# Patient Record
Sex: FEMALE | Race: WHITE | HISPANIC OR LATINO | Employment: FULL TIME | URBAN - METROPOLITAN AREA
[De-identification: names, ages, dates, MRNs, and addresses within clinical notes are randomized per-mention and may not be internally consistent; named-entity substitution may affect disease eponyms.]

---

## 2018-09-14 ENCOUNTER — OFFICE VISIT (OUTPATIENT)
Dept: OBGYN CLINIC | Facility: CLINIC | Age: 33
End: 2018-09-14
Payer: COMMERCIAL

## 2018-09-14 VITALS
BODY MASS INDEX: 41.95 KG/M2 | DIASTOLIC BLOOD PRESSURE: 80 MMHG | SYSTOLIC BLOOD PRESSURE: 122 MMHG | WEIGHT: 293 LBS | HEIGHT: 70 IN

## 2018-09-14 DIAGNOSIS — Z11.51 ENCOUNTER FOR SCREENING FOR HUMAN PAPILLOMAVIRUS (HPV): ICD-10-CM

## 2018-09-14 DIAGNOSIS — Z01.419 ENCOUNTER FOR GYNECOLOGICAL EXAMINATION (GENERAL) (ROUTINE) WITHOUT ABNORMAL FINDINGS: Primary | ICD-10-CM

## 2018-09-14 DIAGNOSIS — Z30.09 ENCOUNTER FOR GENERAL COUNSELING AND ADVICE ON CONTRACEPTIVE MANAGEMENT: ICD-10-CM

## 2018-09-14 DIAGNOSIS — Z77.21 PERSONAL HISTORY OF EXPOSURE TO POTENTIALLY HAZARDOUS BODY FLUIDS: ICD-10-CM

## 2018-09-14 DIAGNOSIS — Z12.4 CERVICAL CANCER SCREENING: ICD-10-CM

## 2018-09-14 PROBLEM — F32.9 MAJOR DEPRESSIVE DISORDER: Status: ACTIVE | Noted: 2018-09-14

## 2018-09-14 PROCEDURE — S0610 ANNUAL GYNECOLOGICAL EXAMINA: HCPCS | Performed by: NURSE PRACTITIONER

## 2018-09-14 PROCEDURE — 87624 HPV HI-RISK TYP POOLED RSLT: CPT | Performed by: NURSE PRACTITIONER

## 2018-09-14 PROCEDURE — 87591 N.GONORRHOEAE DNA AMP PROB: CPT | Performed by: NURSE PRACTITIONER

## 2018-09-14 PROCEDURE — G0145 SCR C/V CYTO,THINLAYER,RESCR: HCPCS | Performed by: PATHOLOGY

## 2018-09-14 PROCEDURE — G0124 SCREEN C/V THIN LAYER BY MD: HCPCS | Performed by: PATHOLOGY

## 2018-09-14 PROCEDURE — 87491 CHLMYD TRACH DNA AMP PROBE: CPT | Performed by: NURSE PRACTITIONER

## 2018-09-14 RX ORDER — MISOPROSTOL 200 UG/1
200 TABLET ORAL ONCE
Qty: 1 TABLET | Refills: 0 | Status: SHIPPED | OUTPATIENT
Start: 2018-09-14 | End: 2018-11-06

## 2018-09-14 RX ORDER — BACLOFEN 20 MG/1
TABLET ORAL AS NEEDED
COMMUNITY
Start: 2018-08-31 | End: 2021-04-28

## 2018-09-14 RX ORDER — DICLOFENAC SODIUM 75 MG/1
TABLET, DELAYED RELEASE ORAL AS NEEDED
COMMUNITY
Start: 2018-08-31 | End: 2019-12-31 | Stop reason: HOSPADM

## 2018-09-14 NOTE — ASSESSMENT & PLAN NOTE
Normal findings on routine gyn exam  Advised monthly SBE, annual CBE and baseline screening mammo at age 36  Reviewed ASCCP guidelines and recommended pap with cotesting q3 yrs for this low risk patient; this was collected today  STI testing was offered and the patient does agree to gc/chl; she reports low risk  Diet/activity recommendations were reviewed and weight loss was encouraged  RTO in one year or sooner PRN

## 2018-09-14 NOTE — ASSESSMENT & PLAN NOTE
This patient presents for contraceptive counseling  We discussed all options at length including barrier methods, combination estrogen progesterone methods (pill, patch and ring), progesterone only methods (pill, Depo, Nexplanon and IUD) and non-hormonal methods (paragard, NFP, sterilization)  We reviewed directions for use, Ses/AEs, risks and benefits  All questions were answered  She is aware that condoms will be advised with all sexual contact for prevention of STI  The patient's personal and FH were reviewed and she is without risk factors for VTE  The patient elects Mirena IUD  Written info provided  She will RTO before the end of the Depo window  Cytotec provided

## 2018-09-14 NOTE — PROGRESS NOTES
Assessment/Plan:    Encounter for general counseling and advice on contraceptive management  This patient presents for contraceptive counseling  We discussed all options at length including barrier methods, combination estrogen progesterone methods (pill, patch and ring), progesterone only methods (pill, Depo, Nexplanon and IUD) and non-hormonal methods (paragard, NFP, sterilization)  We reviewed directions for use, Ses/AEs, risks and benefits  All questions were answered  She is aware that condoms will be advised with all sexual contact for prevention of STI  The patient's personal and FH were reviewed and she is without risk factors for VTE  The patient elects Mirena IUD  Written info provided  She will RTO before the end of the Depo window  Cytotec provided  Encounter for gynecological examination (general) (routine) without abnormal findings  Normal findings on routine gyn exam  Advised monthly SBE, annual CBE and baseline screening mammo at age 36  Reviewed ASCCP guidelines and recommended pap with cotesting q3 yrs for this low risk patient; this was collected today  STI testing was offered and the patient does agree to gc/chl; she reports low risk  Diet/activity recommendations were reviewed and weight loss was encouraged  RTO in one year or sooner PRN  Diagnoses and all orders for this visit:    Encounter for gynecological examination (general) (routine) without abnormal findings    Cervical cancer screening  -     Liquid-based pap, screening    Encounter for screening for human papillomavirus (HPV)  -     Liquid-based pap, screening    Personal history of exposure to potentially hazardous body fluids  -     Chlamydia/GC amplified DNA by PCR    Encounter for general counseling and advice on contraceptive management  -     misoprostol (CYTOTEC) 200 mcg tablet;  Take 1 tablet (200 mcg total) by mouth once for 1 dose    Other orders  -     baclofen 20 mg tablet;   -     diclofenac (VOLTAREN) 75 mg EC tablet;           Subjective:      Patient ID: Mele Carrion is a 28 y o  female  This new patient presents for routine annual gyn exam  Transferring care from [facility]  Helen Prader is a 28 y o  G0  PMHx noncontrib  Gyn hx notable for remote abn pap with normal f/u; denies h/o STI  FH neg for breast, ova or colon ca  Currently on Depo and wishes to d/c due to working aggressively on weight loss  Interested in [place] [address] due to h/o heavy menses  Amenorrheic on Depo  She denies acute gyn complaints today  She denies pelvic pain, abn discharge, breast concerns, bowel/bladder dysfunction, depression/anx  Long term monog relationship  Denies STI concerns but agrees to testing  Works in [employer]          The following portions of the patient's history were reviewed and updated as appropriate: allergies, current medications, past family history, past medical history, past social history, past surgical history and problem list     Review of Systems   Constitutional: Negative  HENT: Negative  Eyes: Negative  Respiratory: Negative  Cardiovascular: Negative  Gastrointestinal: Negative  Endocrine: Negative  Genitourinary: Negative  Musculoskeletal: Negative  Skin: Negative  Allergic/Immunologic: Negative  Neurological: Negative  Hematological: Negative  Psychiatric/Behavioral: Negative  Objective:      /80 (BP Location: Right arm, Cuff Size: Extra-Large)   Ht 5' 10" (1 778 m)   Wt (!) 158 kg (349 lb)   BMI 50 08 kg/m²          Physical Exam   Constitutional: She is oriented to person, place, and time  She appears well-developed and well-nourished  BMI 50   HENT:   Head: Normocephalic and atraumatic  Eyes: EOM are normal  Pupils are equal, round, and reactive to light  Neck: Normal range of motion  Neck supple  No thyromegaly present  Cardiovascular: Normal rate, regular rhythm and normal heart sounds      Pulmonary/Chest: Effort normal and breath sounds normal  No respiratory distress  She has no wheezes  She has no rales  She exhibits no mass, no tenderness and no deformity  Right breast exhibits no inverted nipple, no mass, no nipple discharge, no skin change and no tenderness  Left breast exhibits no inverted nipple, no mass, no nipple discharge, no skin change and no tenderness  Breasts are symmetrical    Abdominal: Soft  She exhibits no distension and no mass  There is no splenomegaly or hepatomegaly  There is no tenderness  There is no rebound and no guarding  Genitourinary: Rectum normal, vagina normal and uterus normal  No breast swelling, tenderness or discharge  No labial fusion  There is no rash, tenderness, lesion or injury on the right labia  There is no rash, tenderness, lesion or injury on the left labia  Cervix exhibits no motion tenderness, no discharge and no friability  Right adnexum displays no mass, no tenderness and no fullness  Left adnexum displays no mass, no tenderness and no fullness  No erythema, tenderness or bleeding in the vagina  No foreign body in the vagina  No vaginal discharge found  Musculoskeletal: Normal range of motion  Lymphadenopathy:     She has no cervical adenopathy  She has no axillary adenopathy  Neurological: She is alert and oriented to person, place, and time  No cranial nerve deficit  Skin: Skin is warm and dry  No rash noted  No cyanosis  Nails show no clubbing  Psychiatric: She has a normal mood and affect   Her speech is normal and behavior is normal  Judgment and thought content normal  Cognition and memory are normal

## 2018-09-17 LAB
CHLAMYDIA DNA CVX QL NAA+PROBE: NORMAL
HPV HR 12 DNA CVX QL NAA+PROBE: POSITIVE
HPV16 DNA CVX QL NAA+PROBE: NEGATIVE
HPV18 DNA CVX QL NAA+PROBE: NEGATIVE
N GONORRHOEA DNA GENITAL QL NAA+PROBE: NORMAL

## 2018-09-19 ENCOUNTER — TELEPHONE (OUTPATIENT)
Dept: OBGYN CLINIC | Facility: CLINIC | Age: 33
End: 2018-09-19

## 2018-09-19 LAB
LAB AP GYN PRIMARY INTERPRETATION: NORMAL
Lab: NORMAL
PATH INTERP SPEC-IMP: NORMAL

## 2018-09-19 NOTE — TELEPHONE ENCOUNTER
----- Message from Abeba Vines, [address] sent at 9/19/2018  4:53 PM EDT -----  Pap with ASCUS, pos HR HPV   Please notify patient and advise scheduling colpo at her earliest convenience   Neg gc/chl

## 2018-09-19 NOTE — TELEPHONE ENCOUNTER
Mirena IUD arrived today from [facility] pharmacy  Pt can be scheduled for insertion at any time with Ray, she is on Depo for birth control

## 2018-09-19 NOTE — TELEPHONE ENCOUNTER
Left voicemail message today @ [phone] for Pt to call back office regarding Pap smear result

## 2018-09-20 NOTE — TELEPHONE ENCOUNTER
Patient returned call - she is aware of pap results and that she needs a colpo  Explained what a colpo was and how it is done  Advised to take 2-3 Ibuprofen an hour before hand and to eat something light also  Patient stated she is scheduled for an IUD insertion for 09/25 - wants to know if she has to cancel that  Please advise  Thanks!

## 2018-09-21 NOTE — TELEPHONE ENCOUNTER
Patient aware  Confirmed appt for IUD insert  Will schedule Colposcopy on Tuesday when she is here for her appt

## 2018-09-21 NOTE — TELEPHONE ENCOUNTER
She can still proceed with appt for IUD insertion and schedule colpo on another day  It looks likes she is scheduled with me for IUD, so unfortunately we can't switch the visit to a colpo visit because I do not perform them

## 2018-09-25 ENCOUNTER — OFFICE VISIT (OUTPATIENT)
Dept: OBGYN CLINIC | Facility: CLINIC | Age: 33
End: 2018-09-25
Payer: COMMERCIAL

## 2018-09-25 VITALS
HEIGHT: 70 IN | DIASTOLIC BLOOD PRESSURE: 96 MMHG | SYSTOLIC BLOOD PRESSURE: 132 MMHG | BODY MASS INDEX: 41.95 KG/M2 | WEIGHT: 293 LBS

## 2018-09-25 DIAGNOSIS — Z30.430 ENCOUNTER FOR IUD INSERTION: Primary | ICD-10-CM

## 2018-09-25 LAB — SL AMB POCT URINE HCG: NEGATIVE

## 2018-09-25 PROCEDURE — 58300 INSERT INTRAUTERINE DEVICE: CPT | Performed by: NURSE PRACTITIONER

## 2018-09-25 PROCEDURE — 81025 URINE PREGNANCY TEST: CPT | Performed by: NURSE PRACTITIONER

## 2018-09-25 NOTE — ASSESSMENT & PLAN NOTE
IUD inserted without difficulty  Pt tolerated well  Reviewed reasons to call and sx to report incl excessive bleeding, pain unrelieved by ibuprofen or symptoms of infection such as fever, chills or foul smelling discharge  Recommended returning to the office in 6 weeks for IUD string check and utilizing condoms as back up until that time  The patient agrees to the plan

## 2018-09-25 NOTE — PROGRESS NOTES
Iud insertions  Date/Time: 9/25/2018 10:50 AM  Performed by: Mindy Rick  Authorized by: Mindy Rick     Consent:     Consent obtained:  Verbal and written (consent also obtained by Ardella Fothergill )    Consent given by:  Patient    Procedure risks and benefits discussed: yes      Patient questions answered: yes      Patient agrees, verbalizes understanding, and wants to proceed: yes      Educational handouts given: yes      Instructions and paperwork completed: yes    Procedure:     Pelvic exam performed: yes      Negative GC/chlamydia test: yes      Negative urine pregnancy test: yes      Cervix cleaned and prepped: yes      Speculum placed in vagina: yes      Tenaculum applied to cervix: yes      Uterus sounded: yes      IUD inserted with no complications: yes      IUD type:  Mirena    Strings trimmed: yes      Uterus sound depth (cm):  8  Post-procedure:     Patient tolerated procedure well: yes      Patient will follow up after next period: yes    Comments: This patient presents for IUD insertion  The risks/benefits, SE's/AE's were reviewed and all questions were answered  Written and verbal consent were obtained by myself and physician  Time out was performed and allergies were confirmed  The patient was positioned in lithotomy position and spec was inserted  Cervix was visualized and cleansed with betadine  Tenac was applied to the anterior cervix  Uterus sounded to 8cm  The IUD was inserted without difficulty and the strings were trimmed  Hemostasis was observed and all instruments were removed  The patient tolerated well

## 2018-09-28 ENCOUNTER — TELEPHONE (OUTPATIENT)
Dept: OBGYN CLINIC | Facility: CLINIC | Age: 33
End: 2018-09-28

## 2018-09-28 NOTE — TELEPHONE ENCOUNTER
Spoke with patient  Advised some cramping and spotting is normal  Continue with Advil/Aleeve  Continue with back up protection until string check  Patient verbalizes understanding

## 2018-09-28 NOTE — TELEPHONE ENCOUNTER
----- Message from [address] [place] [address]  Toño Espinoza sent at 9/28/2018 11:47 AM EDT -----  Regarding: Visit Follow-Up Question  Contact: [phone]  Ellis Hernandez! I had a couple follow up questions about the iud  I have been doing pretty well over all however I had a couple concerns which I'm sure mean nothing  I'm getting really bad cramps that wake me up at night at like 3am  I take Advil and then I'm able to go back to sleep  I have been ok during the day  Also, I had sex last night for the first time since the iud was placed, I used a condom as you suggested  There was no pain or discomfort  This morning I had some bleeding but not heavy, is that ok? And I should continue to use condoms until my string check correct?     Thanks,    Patricia Beckford

## 2018-11-06 ENCOUNTER — PROCEDURE VISIT (OUTPATIENT)
Dept: OBGYN CLINIC | Facility: MEDICAL CENTER | Age: 33
End: 2018-11-06
Payer: COMMERCIAL

## 2018-11-06 VITALS — DIASTOLIC BLOOD PRESSURE: 76 MMHG | BODY MASS INDEX: 47.95 KG/M2 | SYSTOLIC BLOOD PRESSURE: 112 MMHG | WEIGHT: 293 LBS

## 2018-11-06 DIAGNOSIS — R87.610 ASCUS WITH POSITIVE HIGH RISK HPV CERVICAL: Primary | ICD-10-CM

## 2018-11-06 DIAGNOSIS — R87.810 ASCUS WITH POSITIVE HIGH RISK HPV CERVICAL: Primary | ICD-10-CM

## 2018-11-06 PROBLEM — Z30.09 ENCOUNTER FOR GENERAL COUNSELING AND ADVICE ON CONTRACEPTIVE MANAGEMENT: Status: RESOLVED | Noted: 2018-09-14 | Resolved: 2018-11-06

## 2018-11-06 PROBLEM — Z01.419 ENCOUNTER FOR GYNECOLOGICAL EXAMINATION (GENERAL) (ROUTINE) WITHOUT ABNORMAL FINDINGS: Status: RESOLVED | Noted: 2018-09-14 | Resolved: 2018-11-06

## 2018-11-06 PROBLEM — Z30.430 ENCOUNTER FOR IUD INSERTION: Status: RESOLVED | Noted: 2018-09-25 | Resolved: 2018-11-06

## 2018-11-06 PROCEDURE — 88305 TISSUE EXAM BY PATHOLOGIST: CPT | Performed by: PATHOLOGY

## 2018-11-06 PROCEDURE — 57454 BX/CURETT OF CERVIX W/SCOPE: CPT | Performed by: PHYSICIAN ASSISTANT

## 2018-11-06 NOTE — ASSESSMENT & PLAN NOTE
Colposcopy, 2 biopsies, and ECC performed  Specimen sent to pathology, I will call patient with results  Counseled patient that she can expect cramping, light bleeding, and/or dark clumpy discharge  I recommend NSAIDs PRN cramping  She should call or RTO if any severe abdominal pain, heavy bleeding, or fever > 101    IUD in place but stings very short and pushed into cervical canal when doing ECC

## 2018-11-06 NOTE — PROGRESS NOTES
/76 (BP Location: Left arm)   Wt (!) 152 kg (334 lb 3 2 oz)   BMI 47 95 kg/m²     Colposcopy  Date/Time: 11/6/2018 12:16 PM  Performed by: Jeanne Cartwright  Authorized by: Jeanne Cartwright     Consent:     Consent obtained:  Written    Consent given by:  Patient    Procedural risks discussed:  Bleeding, damage to other organs, infection and repeat procedure    Patient questions answered: yes      Patient agrees, verbalizes understanding, and wants to proceed: yes      Instructions and paperwork completed: yes    Indication:     Indication:  ASC-US (HPV non 16/18 +)  Procedure:     Procedure: Colposcopy w/ cervical biopsy and ECC      Under satisfactory analgesia the patient was prepped and draped in the dorsal lithotomy position: yes      Penn Laird speculum was placed in the vagina: yes      Under colposcopic examination the transition zone was seen in entirety: yes (+) IUD strings visualized BUT VERY SHORT STRINGS, approx 0 5cm in length      Endocervix was curetted using a Kevorkian curette: yes (in doing ECC IUD strings pushed into cervical canal and no longer visible at end of procedure)      Cervical biopsy performed with a cervical biopsy punch: yes      Monsel's solution was applied: yes      Biopsy(s): yes      Location:  12:00 & 4:00    Specimen to pathology: yes    Post-procedure:     Findings: White epithelium      Impression: Low grade cervical dysplasia      Patient tolerance of procedure: Tolerated well, no immediate complications      Problem List Items Addressed This Visit     ASCUS with positive high risk HPV cervical - Primary     Colposcopy, 2 biopsies, and ECC performed  Specimen sent to pathology, I will call patient with results  Counseled patient that she can expect cramping, light bleeding, and/or dark clumpy discharge  I recommend NSAIDs PRN cramping  She should call or RTO if any severe abdominal pain, heavy bleeding, or fever > 101    IUD in place but stings very short and pushed into cervical canal when doing ECC             Relevant Orders    Tissue Exam

## 2018-11-09 ENCOUNTER — TELEPHONE (OUTPATIENT)
Dept: OBGYN CLINIC | Facility: CLINIC | Age: 33
End: 2018-11-09

## 2018-11-09 NOTE — TELEPHONE ENCOUNTER
----- Message from Anoop Laureano PA-C sent at 11/9/2018  8:26 AM EST -----  This is Kinga's result - Please let dae know that her colposcopy specimens are normal   Would recommend Pap and HPV testing in one year

## 2019-07-17 ENCOUNTER — TELEPHONE (OUTPATIENT)
Dept: BARIATRICS | Facility: CLINIC | Age: 34
End: 2019-07-17

## 2019-07-17 NOTE — TELEPHONE ENCOUNTER
Email sent to patient to contact insurance regarding bariatric coverage/reminder of upcoming 3-hour eval cw

## 2019-07-18 RX ORDER — RANITIDINE 150 MG/1
150 TABLET ORAL AS NEEDED
COMMUNITY
Start: 2019-06-07 | End: 2019-11-06 | Stop reason: ALTCHOICE

## 2019-07-18 RX ORDER — BUPROPION HYDROCHLORIDE 150 MG/1
150 TABLET ORAL EVERY MORNING
COMMUNITY
Start: 2019-03-25 | End: 2020-02-20

## 2019-07-18 RX ORDER — ESCITALOPRAM OXALATE 20 MG/1
20 TABLET ORAL
COMMUNITY
Start: 2019-03-22 | End: 2019-09-28

## 2019-07-22 ENCOUNTER — CLINICAL SUPPORT (OUTPATIENT)
Dept: BARIATRICS | Facility: CLINIC | Age: 34
End: 2019-07-22

## 2019-07-22 VITALS
HEART RATE: 75 BPM | TEMPERATURE: 98.2 F | SYSTOLIC BLOOD PRESSURE: 138 MMHG | WEIGHT: 293 LBS | DIASTOLIC BLOOD PRESSURE: 86 MMHG | BODY MASS INDEX: 41.95 KG/M2 | HEIGHT: 70 IN

## 2019-07-22 DIAGNOSIS — E66.01 MORBID OBESITY (HCC): Primary | ICD-10-CM

## 2019-07-22 PROCEDURE — RECHECK

## 2019-07-22 NOTE — PROGRESS NOTES
Bariatric Nutrition Assessment Note    Type of surgery  6 Month Pre-op Program: Pt requires 6 weight checks 2/2 insurance requirements  Surgery Date: TBD  Surgeon: Dr Ludy Ortiz  35 y o   female    Vitals:    07/22/19 0821   BP: 138/86   Pulse: 75   Temp: 98 2 °F (36 8 °C)     Weight (last 2 days)     Date/Time   Weight    07/22/19 0821   (!) 161 (355)            Height: 70 0 inches  Body mass index is 50 94 kg/m²  Weight in (lb) to have BMI = 25: 174 3 lbs  Pre-Op Excess Wt: 180 7 lbs  10% of Initial Weight = 35 5 lbs  Goal Pre-operative Weight (Initial Weight - 10% of Initial Weight) = 319 5 lbs  (BMI 45 8)  Pt is aware that to schedule for surgery, she must lose 1/2 of required 10% weight loss: Scheduling weight = 337 3 lbs  Pt is aware that her final pre-operative weight goal is based on surgeon's individual recommendations  Pt is aware that she cannot gain weight in the pre-operative process  Estimated needs via [address] [place] [address] Equation based on pt's current height, weight, age, and sex:  BMR: 2399 kcal/day  Estimated calorie needs for weight maintenance = 2879 kcal per day (sedentary)  Estimated calorie needs for weight loss = 9648-1424 kcal per day (1-2 lb  wt loss per week - sedentary)  Estimated protein needs = 79-95 grams per day (1 0-1 2 grams/kg IBW)    Weight History   Onset of Obesity: Childhood  Family history of obesity: mother and sister per pt  In the past 6 months patient has tried the following weight loss attempts and has failed:  Commercial Programs ([employer], [employer], etc )  Exercise  High Protein/Low CHO diets (Atkins, Union, etc )  Self Created Diets (Portion Control, Healthy Food Choices, etc )  Maximum Wt Lost: 50 lbs   per pt with Weight Watchers    Review of History and Medications   Past Medical History:   Diagnosis Date    Anxiety     Back pain     Depressed     Femur fracture (HCC)     GERD (gastroesophageal reflux disease)     HPV (human papilloma virus) infection     Hypertension     Irritable bowel syndrome     Obesity     Pre-diabetes      Past Surgical History:   Procedure Laterality Date    FEMUR FRACTURE SURGERY      WISDOM TOOTH EXTRACTION       Social History     Socioeconomic History    Marital status: Single     Spouse name: None    Number of children: None    Years of education: None    Highest education level: None   Occupational History    None   Social Needs    Financial resource strain: None    Food insecurity:     Worry: None     Inability: None    Transportation needs:     Medical: None     Non-medical: None   Tobacco Use    Smoking status: Former Smoker     Last attempt to quit: 2017     Years since quittin 5    Smokeless tobacco: Former User     Quit date: 2016   Substance and Sexual Activity    Alcohol use: No    Drug use: No    Sexual activity: Yes     Partners: Male     Birth control/protection: Injection   Lifestyle    Physical activity:     Days per week: None     Minutes per session: None    Stress: None   Relationships    Social connections:     Talks on phone: None     Gets together: None     Attends Yazidi service: None     Active member of club or organization: None     Attends meetings of clubs or organizations: None     Relationship status: None    Intimate partner violence:     Fear of current or ex partner: None     Emotionally abused: None     Physically abused: None     Forced sexual activity: None   Other Topics Concern    None   Social History Narrative    None       Current Outpatient Medications:     baclofen 20 mg tablet, , Disp: , Rfl:     buPROPion (WELLBUTRIN XL) 150 mg 24 hr tablet, Take 150 mg by mouth, Disp: , Rfl:     diclofenac (VOLTAREN) 75 mg EC tablet, , Disp: , Rfl:     escitalopram (LEXAPRO) 20 mg tablet, Take 20 mg by mouth, Disp: , Rfl:     MIRENA, 52 MG, 20 MCG/24HR IUD, TO BE INSERTED ONE TIME BY PRESCRIBER  ROUTE INTRAUTERINE , Disp: , Rfl: 0    ranitidine (ZANTAC) 150 mg tablet, Take 150 mg by mouth, Disp: , Rfl:     Food Intake and Lifestyle Assessment:   Food Intake Assessment completed via usual diet recall:  Breakfast: overnight oats with 1/2 cup oats, 1/2 cup NF Greek yogurt, 1/2 cup unsweetened almond milk, 1/2 cup fruit, 1 scoop protein powder (pt unaware of brand); 2 eggs over easy with 1 piece potato bread toast  Snack: sometimes - granola bar [employer] protein bar)   Lunch: sometimes skip on days off 2-3 days per week - pt states if she does not pack her lunch, she will make bad choices with fast food; if pt does food prep - turkey meatballs; spaghetti squash; brown rice and grilled chicken or turkey or fish (salmon) or dinner leftovers; chicken crust pizza  Snack: none  Dinner: macaroni and cheese with chicken; similar foods to lunch; sometimes fast foods; salad with salmon and burger meat; eggplant lasagna with turkey, mushrooms, and garlic  Snack: ice cream - not daily, but pt states if it is in the house, she will eat it - sometime Enlightened (low kcal ice cream)    Pt states she and her boyfriend have bad habits with fast food and takeout, especially when they do not prepare their own foods at home      Beverage intake: water, regular soda and alcohol (socially), unsweetened tea with lemon  Estimated fluid intake per day: 40-80 ounces per day of water  Protein supplement: protein shake in the AM and sometimes a protein bar during the day  Estimated protein intake per day: at least 60 grams per day  Meals eaten away from home: frequently  Typical meal pattern: 2-3 meals per day and 2-3 snacks per day  Eating Behaviors: Consumption of high calorie/ high fat foods, Consumption of high calorie beverages, Large portion sizes and Craves sweet foods  Cultural or Congregational considerations: n/a  Food allergies or intolerances: none per pt  No Known Allergies    Physical Assessment  Physical Activity  Types of exercise: None  Walking at work  Current physical limitations: none    Psychosocial Assessment   Support systems: relative(s), significant other  Socioeconomic factors: n/a    Nutrition Diagnosis  Diagnosis: Overweight / Obesity (NC-3 3)  Related to: Excessive energy intake  As Evidenced by: BMI >25     Nutrition Prescription: Recommend the following diet  Low sugar, High protein, Regular     Interventions and Teaching   Discussed pre-op and post-op nutrition guidelines  Patient educated and handouts provided  Surgical changes to stomach / GI  Capacity of post-surgery stomach  Diet progression  Adequate hydration  Sugar and fat restriction to decrease "dumping syndrome"  Weight loss plateaus/ possibility of weight regain  Exercise  Suggestions for pre-op diet  Nutrition considerations after surgery  Protein supplements  Meal planning and preparation  Appropriate carbohydrate, protein, and fat intake, and food/fluid choices to maximize safe weight loss, nutrient intake, and tolerance   Dietary and lifestyle changes  Possible problems with poor eating habits  Intuitive eating  Techniques for self monitoring and keeping daily food journal  Potential for food intolerance after surgery, and ways to deal with them including: lactose intolerance, nausea, reflux, vomiting, diarrhea, food intolerance, appetite changes, gas  Vitamin / Mineral supplementation of Multivitamin with minerals and Vitamin D     Education provided to: patient and pt's boyfriend  Barriers to learning: No barriers identified  Readiness to change: preparation  Prior research on procedure: pre-op class  Comprehension: verbalizes understanding   Expected Compliance: good    Recommendations  Pt is an appropriate candidate for surgery   Yes     Evaluation / Monitoring  Dietitian to Monitor: Eating pattern as discussed, Tolerance of nutrition prescription, Body weight, Physical activity     Goals:  Eliminate sugar sweetened beverages  Food journal  Exercise 30 minutes 5 times per week  Complete lesson plans 1-6  Eat 3 meals per day  Eliminate mindless snacking     Time Spent:   1 Hour

## 2019-07-22 NOTE — PROGRESS NOTES
Bariatric Behavioral Health Evaluation    Presenting Problem:  Salomon Peck, :  1985    Is the patient seeking Bariatric Surgery Eval? Yes  If yes how long have you researched this surgery option  Patient has been researching bariatric surgery for approximately 5-7 years    Realizes Post- Op Requirements? Yes Patient has mother with bariatric surgery    Pre-morbid level of function and history of present illness: Patient starting to experience weight related health concerns    Psychiatric/Psychological Treatment Diagnosis: Axis I diagnosis of depression and anxiety  PCP currently prescribes meds    Outpatient Counselor No Not currently utilizing but interested in starting therapy  Provided patient with resource list    Psychiatrist Yes  Has utilized in the past not currently seeing    Have you had Inpatient Treatment?  No    Family Constellation (include relationship with each and Psych/Med HX)    Mother  obesity, history of addiction and mental health illness, Siblings  obesity and mental health illness and Other  obesity    Domestic Violence No    The patient admits to history of adult trauma    Additional comments/stressors related to family/relationships/peer support :weight, health, employment    Physical/Psychological Assessment:     Appearance: appropriate  Sociability: average  Affect: appropriate  Mood: calm  Thought Process: coherent  Speech: normal  Content: no impairment  Orientation: person  Yes   Insight: emotional  good    Risk Assessment:         Recommendations: Recommended for surgery  yes    Risk of Harm to Self or Others: Denies SI and HI    Observation:     Interviews this interview only    Access to weapons no     Based on the previous information, the client presents the following risk of harm to self or others: low    BARIATRIC [place]    I have received education related to my bariatric surgery process and understand:    Patients may be required to complete a psychiatric evaluation and receive clearance for surgery from their psychiatrist     Patients who undergo weight loss surgery are at higher risk of increased mental health concerns and suicide attempts  Patients may be required to complete a full substance abuse evaluation and then complete all treatment recommendations prior to surgery  If diagnosis of abuse/dependence results, patient may be required to remain sober for one (1) year before having bariatric surgery  Patients on psychiatric medications should check with their provider to discuss psychiatric medications and the changes in absorption  Patient should discuss all time release medications with provider and take all medications as prescribed  The recommendation is that there is no use of  any tobacco products, Hookah or  vapes for the bariatric post-operation patient  Bariatric surgery patients should not consume alcohol as a post-operative patient as it may increase risk of numerous health conditions including but not limited to alcohol abuse and ulcers  There is a possibility of weight regain if patient does not follow all program guidelines and recommendations  Bariatric surgery patients should exercise thirty (30) to sixty (60) minutes per day to maintain post-surgical weight loss  Research indicates that bariatric patients are more successful when they see a therapist for up to two (2) years post-op  Patients will follow all medical and dietary recommendations provided  Patient will keep all scheduled appointments and follow up with their physician for a minimum of five (5) years  Patient will take all vitamins as recommended  Post-operative vitamins are life-long  Patient reviewed Bariatric Surgery Education Checklist and agrees they have received education on these issues       Note :  Patient admits to Axis I diagnosis of anxiety and depression and is currently taking medications prescribed by her PCP  Patient denies racheal SI or HI  Patient denies any food or housing insufficiency concerns  Patient admits to a positive history of adult trauma  Patient has mother with bariatric surgery and a father and mother with a past history of ETOH  Patient educated regarding the impact of nicotine and alcohol on the post bariatric surgery patient  Patient meets criteria for surgery at this program and is referred to physician

## 2019-07-26 ENCOUNTER — TRANSCRIBE ORDERS (OUTPATIENT)
Dept: SLEEP CENTER | Facility: CLINIC | Age: 34
End: 2019-07-26

## 2019-08-02 ENCOUNTER — APPOINTMENT (OUTPATIENT)
Dept: URGENT CARE | Facility: CLINIC | Age: 34
End: 2019-08-02

## 2019-08-02 DIAGNOSIS — Z02.1 PHYSICAL EXAM, PRE-EMPLOYMENT: ICD-10-CM

## 2019-08-02 DIAGNOSIS — Z02.1 PHYSICAL EXAM, PRE-EMPLOYMENT: Primary | ICD-10-CM

## 2019-08-03 ENCOUNTER — APPOINTMENT (OUTPATIENT)
Dept: LAB | Facility: CLINIC | Age: 34
End: 2019-08-03

## 2019-08-03 PROCEDURE — 86480 TB TEST CELL IMMUN MEASURE: CPT

## 2019-08-03 PROCEDURE — 36415 COLL VENOUS BLD VENIPUNCTURE: CPT

## 2019-08-03 PROCEDURE — 86787 VARICELLA-ZOSTER ANTIBODY: CPT

## 2019-08-05 LAB — VZV IGG SER IA-ACNC: NORMAL

## 2019-08-07 LAB
GAMMA INTERFERON BACKGROUND BLD IA-ACNC: 0.09 IU/ML
M TB IFN-G BLD-IMP: NEGATIVE
M TB IFN-G CD4+ BCKGRND COR BLD-ACNC: -0.06 IU/ML
M TB IFN-G CD4+ BCKGRND COR BLD-ACNC: -0.07 IU/ML
MITOGEN IGNF BCKGRD COR BLD-ACNC: >10 IU/ML

## 2019-08-13 ENCOUNTER — OFFICE VISIT (OUTPATIENT)
Dept: BARIATRICS | Facility: CLINIC | Age: 34
End: 2019-08-13

## 2019-08-13 VITALS — WEIGHT: 293 LBS | HEIGHT: 70 IN | BODY MASS INDEX: 41.95 KG/M2

## 2019-08-13 DIAGNOSIS — E66.01 MORBID (SEVERE) OBESITY DUE TO EXCESS CALORIES (HCC): Primary | ICD-10-CM

## 2019-08-13 PROCEDURE — RECHECK

## 2019-08-13 NOTE — PROGRESS NOTES
Bariatric Follow Up Nutrition Note    Pre-op  Pre-op program    Type of surgery  Surgery Date: TBD  Surgeon: Dr Betty Quintana  35 y o   female    There were no vitals filed for this visit  Weight (last 2 days)     Date/Time   Weight    08/13/19 0851   (!) 158 (349)            Body mass index is 50 08 kg/m²  Height: 70 inches    Pt is scheduled for her next appointment with the surgeon for an initial consult on 8/15/19  Reviewed pt's pre-operative workflow in [employer] this visit  Pt recently started this process last month and has all requirements pending for surgery at this time  Pt is aware of her requirements  Pt reports that she will be switching to a different insurance within the next few weeks, which she is aware may alter her required weight checks  Pre-op Weight History:  Weight on Day of Initial Dietary Evaluation: 355 0 lbs  (BMI 50 9) 7/22/2019  Current Weight: 349 0 lbs  Pt has lost 6 lbs  since initial dietary evaluation  Pre-op goal weight: 319 5 lbs  (10% loss from initial evaluation weight)   Weight to be scheduled for surgery after completing clearances: 337 3 lbs  (1/2 lost of required pre-op goal weight)    Weight in to have BMI = 25: 174 3 lbs      Review of History and Medications   Past Medical History:   Diagnosis Date    Anxiety     Back pain     Depressed     Femur fracture (HCC)     GERD (gastroesophageal reflux disease)     HPV (human papilloma virus) infection     Hypertension     Irritable bowel syndrome     Obesity     Pre-diabetes      Past Surgical History:   Procedure Laterality Date    FEMUR FRACTURE SURGERY      WISDOM TOOTH EXTRACTION       Social History     Socioeconomic History    Marital status: Single     Spouse name: Not on file    Number of children: Not on file    Years of education: Not on file    Highest education level: Not on file   Occupational History    Not on file   Social Needs    Financial resource strain: Not on file    Food insecurity:     Worry: Not on file     Inability: Not on file    Transportation needs:     Medical: Not on file     Non-medical: Not on file   Tobacco Use    Smoking status: Former Smoker     Last attempt to quit: 2017     Years since quittin 6    Smokeless tobacco: Former User     Quit date: 2016   Substance and Sexual Activity    Alcohol use: No    Drug use: No    Sexual activity: Yes     Partners: Male     Birth control/protection: Injection   Lifestyle    Physical activity:     Days per week: Not on file     Minutes per session: Not on file    Stress: Not on file   Relationships    Social connections:     Talks on phone: Not on file     Gets together: Not on file     Attends Hinduism service: Not on file     Active member of club or organization: Not on file     Attends meetings of clubs or organizations: Not on file     Relationship status: Not on file    Intimate partner violence:     Fear of current or ex partner: Not on file     Emotionally abused: Not on file     Physically abused: Not on file     Forced sexual activity: Not on file   Other Topics Concern    Not on file   Social History Narrative    Not on file       Current Outpatient Medications:     baclofen 20 mg tablet, , Disp: , Rfl:     buPROPion (WELLBUTRIN XL) 150 mg 24 hr tablet, Take 150 mg by mouth, Disp: , Rfl:     diclofenac (VOLTAREN) 75 mg EC tablet, , Disp: , Rfl:     escitalopram (LEXAPRO) 20 mg tablet, Take 20 mg by mouth, Disp: , Rfl:     MIRENA, 52 MG, 20 MCG/24HR IUD, TO BE INSERTED ONE TIME BY PRESCRIBER   ROUTE INTRAUTERINE , Disp: , Rfl: 0    ranitidine (ZANTAC) 150 mg tablet, Take 150 mg by mouth, Disp: , Rfl:     Food Intake and Lifestyle Assessment:   Pt is practicing 30/60-minute rule - yes  Pt is food journaling/logging - yes  Pt is getting at least 64 oz of sugar-free, caffeine-free, noncarbonated fluids daily - yes   Types of fluids include: water   Pt is exercising - no    Pt is here to discuss hunger issues throughout the day  Pt reports that she feels uncontrollable hunger  Food Intake Assessment completed via usual diet recall:  Breakfast: 7:30am - 2 eggs with 1 piece [employer] toast  Snack: none   Lunch: 12-1pm - 5-10 ounces eggplant lasagna made with turkey, mushrooms, and mozz cheese with a banana  Snack: 3-4pm - [employer] protein shake with 12 ounces unsweetened almond milk  Dinner: 7-8pm - 5 baked chicken tenderloins  Snack: fruit (watermelon or strawberries)    Nutrition Diagnosis  Diagnosis: Overweight / Obesity (NC-3 3)  Related to: Physical inactivity and Excessive energy intake  As Evidenced by: BMI >25      Interventions and Teaching   Patient educated on post-op nutrition guidelines  Patient educated and handouts provided    Diet progression  Adequate hydration  Exercise  Suggestions for pre-op diet  Nutrition considerations after surgery  Protein supplements  Meal planning and preparation  Appropriate carbohydrate, protein, and fat intake, and food/fluid choices to maximize safe weight loss, nutrient intake, and tolerance   Dietary and lifestyle changes  Possible problems with poor eating habits  Intuitive eating  Techniques for self monitoring and keeping daily food journal  Potential for food intolerance after surgery, and ways to deal with them including: lactose intolerance, nausea, reflux, vomiting, diarrhea, food intolerance, appetite changes, gas  Vitamin / Mineral supplementation of Multivitamin with minerals and Vitamin D     Education provided to: patient  Barriers to learning: No barriers identified  Readiness to change: Action  Comprehension: demonstrated understanding   Expected Compliance: good     Evaluation / Monitoring:  Eating pattern as discussed, Tolerance of nutrition prescription, Body weight, Lab values, Physical activity     Goals:  Food journal, Exercise 30 minutes 5 times per week, Complete lesson plans 1-6  - Pt is to add a mid-morning snack and replace her PM shake with a snack - provided 3 handouts with options  - Pt is to increase her water intake to 80 ounces per day  - Pt is to practice chewing 25-30 times per bite of food and making her meals last at least 20 minutes at a time     Time Spent:   30 Minutes

## 2019-08-15 ENCOUNTER — OFFICE VISIT (OUTPATIENT)
Dept: BARIATRICS | Facility: CLINIC | Age: 34
End: 2019-08-15
Payer: COMMERCIAL

## 2019-08-15 ENCOUNTER — PREP FOR PROCEDURE (OUTPATIENT)
Dept: BARIATRICS | Facility: CLINIC | Age: 34
End: 2019-08-15

## 2019-08-15 VITALS
BODY MASS INDEX: 41.95 KG/M2 | WEIGHT: 293 LBS | SYSTOLIC BLOOD PRESSURE: 116 MMHG | DIASTOLIC BLOOD PRESSURE: 72 MMHG | TEMPERATURE: 98.9 F | HEART RATE: 79 BPM | RESPIRATION RATE: 18 BRPM | HEIGHT: 70 IN

## 2019-08-15 DIAGNOSIS — E66.01 MORBID OBESITY (HCC): Primary | ICD-10-CM

## 2019-08-15 DIAGNOSIS — E66.01 MORBID (SEVERE) OBESITY DUE TO EXCESS CALORIES (HCC): Primary | ICD-10-CM

## 2019-08-15 PROCEDURE — 99203 OFFICE O/P NEW LOW 30 MIN: CPT | Performed by: SURGERY

## 2019-08-15 RX ORDER — ESCITALOPRAM OXALATE 10 MG/1
5 TABLET ORAL DAILY
COMMUNITY
Start: 2019-08-06 | End: 2021-01-06 | Stop reason: SDUPTHER

## 2019-08-15 NOTE — PROGRESS NOTES
BARIATRIC INITIAL CONSULT - BARIATRIC SURGERY    Luz Marina Peralat 35 y o  female MRN: [id number]  Unit/Bed#:  Encounter: [id number]      HPI:  Luz Marina Peralta is a 35 y o  female who presents with a longstanding history of morbid obesity and inability to sustain a meaningful weight loss  Here today to discuss bariatric options  She is a  for [employer] in [employer] [facility]  Body mass index is 50 42 kg/m²  ++Suffers from depression, back DJD, suspected CELE (awaiting study)  S/p R femur repair, LLE fasciotomy s/p horse accident  Quit smoking 2ya    Visit type: initial visit    Symptoms: excess weight, weight increase, inability to loss weight, dysnea, fatigue, knee pain and back pain    Associated Symptoms: depressed mood    Associated Conditions: sleep apnea and abdominal obesity  Disease Complications: sleep apnea  Weight Loss Interest: high  Previous Diet Trials: low carb    Exercise Frequency:infrequency  Types of Exercise: walking      Review of Systems   Constitutional: Negative  Respiratory: Negative  Cardiovascular: Negative  Gastrointestinal: Negative  Musculoskeletal: Negative  Neurological: Negative  All other systems reviewed and are negative        Historical Information   Past Medical History:   Diagnosis Date    Anxiety     Back pain     Depressed     Femur fracture (HCC)     GERD (gastroesophageal reflux disease)     HPV (human papilloma virus) infection     Hypertension     Irritable bowel syndrome     Obesity     Pre-diabetes      Past Surgical History:   Procedure Laterality Date    FEMUR FRACTURE SURGERY      WISDOM TOOTH EXTRACTION       Social History   Social History     Substance and Sexual Activity   Alcohol Use No     Social History     Substance and Sexual Activity   Drug Use No     Social History     Tobacco Use   Smoking Status Former Smoker    Last attempt to quit:     Years since quittin 6   Smokeless Tobacco Former User    Quit date: 6/19/2016     Family History:   Family History   Problem Relation Age of Onset    Rheum arthritis Mother     Fibroids Mother     Obesity Mother     Depression Mother     Cancer Mother     Diabetes Father     Hyperlipidemia Father     Hypertension Father     Thyroid disease Father     Cancer Father     Depression Sister     Depression Maternal Grandmother     Diabetes Maternal Grandfather     Depression Maternal Grandfather        Meds/Allergies   all medications and allergies reviewed  No Known Allergies    Objective       Current Vitals:   /72 (BP Location: Left arm, Patient Position: Sitting, Cuff Size: Standard)   Pulse 79   Temp 98 9 °F (37 2 °C) (Tympanic)   Resp 18   Ht 5' 10" (1 778 m)   Wt (!) 159 kg (351 lb 6 4 oz)   BMI 50 42 kg/m²       Physical Exam   Constitutional: She is oriented to person, place, and time  She appears well-developed  HENT:   Head: Normocephalic  Eyes: EOM are normal    Neck: Normal range of motion  Cardiovascular: Normal rate  Pulmonary/Chest: Effort normal    Abdominal: She exhibits no distension  Musculoskeletal: Normal range of motion  Neurological: She is alert and oriented to person, place, and time  Skin: Skin is warm and dry  Psychiatric: She has a normal mood and affect  Her behavior is normal  Judgment and thought content normal        Lab Results: I have personally reviewed pertinent lab results  Imaging: I have personally reviewed pertinent reports  EKG, Pathology, and Other Studies: I have personally reviewed pertinent reports  Assessment/PLAN:    35 y o  yo female with a long standing h/o of obesity and inability to sustain any meaningful weight loss on her own despite several attempts  She is interested in the Laparoscopic RYGB      ++Suffers from depression, back DJD, suspected CELE (awaiting study)  S/p R femur repair, LLE fasciotomy s/p horse accident  Quit smoking 2ya      I have explained our Enhanced Recovery After Bariatric Surgery (ERABS) protocol and benefits including preoperative, intraoperative and postoperative elements  As a part of her pre op evaluation, she will be referred to a cardiologist and for a sleep evaluation and consult  She needs an EGD to evaluate the anatomy of her GI tract prior to the operation  I have spent over 45 minutes with her face to face in the office today discussing her options and details of the surgery  We have seen an animation of the surgery on the computer that illustrates how the operation is done and how the anatomy will be altered with the procedure  Over 50% of this was coordinating care  She was given the opportunity to ask questions and I have answered all of them  I have discussed and educated the patient with regards to the components of our multidisciplinary program and the importance of compliance and follow up in the post operative period  The patient was also instructed with regards to the importance of behavior modification, nutritional counseling, support meeting attendance and lifestyle changes that are important to ensure success  Although there is a great statistical chance of improvement or even resolution of most of her associated comorbidities, the results vary from patient to patient and they largely depend on her commitment and compliance  She needs to be at   325 lbs prior to the operation      RICH Patino   8/15/2019  9:28 AM

## 2019-08-15 NOTE — LETTER
August 15, 2019     Abeba Manriquez MD  [address] [address]    Patient: Leslye Torrez   YOB: 1985   Date of Visit: 8/15/2019       Dear Dr Amna Way: Thank you for referring Allen Dunham to me for evaluation for bariatric surgery  Below are my notes for this consultation  If you have questions, please do not hesitate to call me  I look forward to following your patient along with you  Sincerely,      RICH Winslow   8/15/2019  9:37 AM          CC: No Recipients  Brooke Kay MD  8/15/2019  9:36 AM  Sign at close encounter      [address] [facility] - [address] 35 y o  female MRN: [id number]  Unit/Bed#:  Encounter: [id number]      HPI:  Leslye Torrez is a 35 y o  female who presents with a longstanding history of morbid obesity and inability to sustain a meaningful weight loss  Here today to discuss bariatric options  She is a  for  GI in [employer]  Body mass index is 50 42 kg/m²  ++Suffers from depression, back DJD, suspected CELE (awaiting study)  S/p R femur repair, LLE fasciotomy s/p horse accident  Quit smoking 2ya    Visit type: initial visit    Symptoms: excess weight, weight increase, inability to loss weight, dysnea, fatigue, knee pain and back pain    Associated Symptoms: depressed mood    Associated Conditions: sleep apnea and abdominal obesity  Disease Complications: sleep apnea  Weight Loss Interest: high  Previous Diet Trials: low carb    Exercise Frequency:infrequency  Types of Exercise: walking      Review of Systems   Constitutional: Negative  Respiratory: Negative  Cardiovascular: Negative  Gastrointestinal: Negative  Musculoskeletal: Negative  Neurological: Negative  All other systems reviewed and are negative        Historical Information   Past Medical History:   Diagnosis Date    Anxiety     Back pain     Depressed     Femur fracture ([facility] [address] )     GERD (gastroesophageal reflux disease)     HPV (human papilloma virus) infection     Hypertension     Irritable bowel syndrome     Obesity     Pre-diabetes      Past Surgical History:   Procedure Laterality Date    FEMUR FRACTURE SURGERY      WISDOM TOOTH EXTRACTION       Social History   Social History     Substance and Sexual Activity   Alcohol Use No     Social History     Substance and Sexual Activity   Drug Use No     Social History     Tobacco Use   Smoking Status Former Smoker    Last attempt to quit:     Years since quittin 6   Smokeless Tobacco Former User    Quit date: 2016     Family History:   Family History   Problem Relation Age of Onset    Rheum arthritis Mother     Fibroids Mother     Obesity Mother     Depression Mother     Cancer Mother     Diabetes Father     Hyperlipidemia Father     Hypertension Father     Thyroid disease Father    Learta January Cancer Father     Depression Sister     Depression Maternal Grandmother     Diabetes Maternal Grandfather     Depression Maternal Grandfather        Meds/Allergies   all medications and allergies reviewed  No Known Allergies    Objective       Current Vitals:   /72 (BP Location: Left arm, Patient Position: Sitting, Cuff Size: Standard)   Pulse 79   Temp 98 9 °F (37 2 °C) (Tympanic)   Resp 18   Ht 5' 10" (1 778 m)   Wt (!) 159 kg (351 lb 6 4 oz)   BMI 50 42 kg/m²        Physical Exam   Constitutional: She is oriented to person, place, and time  She appears well-developed  HENT:   Head: Normocephalic  Eyes: EOM are normal    Neck: Normal range of motion  Cardiovascular: Normal rate  Pulmonary/Chest: Effort normal    Abdominal: She exhibits no distension  Musculoskeletal: Normal range of motion  Neurological: She is alert and oriented to person, place, and time  Skin: Skin is warm and dry  Psychiatric: She has a normal mood and affect   Her behavior is normal  Judgment and thought content normal        Lab Results: I have personally reviewed pertinent lab results  Imaging: I have personally reviewed pertinent reports  EKG, Pathology, and Other Studies: I have personally reviewed pertinent reports  Assessment/PLAN:    35 y o  yo female with a long standing h/o of obesity and inability to sustain any meaningful weight loss on her own despite several attempts  She is interested in the Laparoscopic RYGB  ++Suffers from depression, back DJD, suspected CELE (awaiting study)  S/p R femur repair, LLE fasciotomy s/p horse accident  Quit smoking 2ya      I have explained our Enhanced Recovery After Bariatric Surgery (ERABS) protocol and benefits including preoperative, intraoperative and postoperative elements  As a part of her pre op evaluation, she will be referred to a cardiologist and for a sleep evaluation and consult  She needs an EGD to evaluate the anatomy of her GI tract prior to the operation  I have spent over 45 minutes with her face to face in the office today discussing her options and details of the surgery  We have seen an animation of the surgery on the computer that illustrates how the operation is done and how the anatomy will be altered with the procedure  Over 50% of this was coordinating care  She was given the opportunity to ask questions and I have answered all of them  I have discussed and educated the patient with regards to the components of our multidisciplinary program and the importance of compliance and follow up in the post operative period  The patient was also instructed with regards to the importance of behavior modification, nutritional counseling, support meeting attendance and lifestyle changes that are important to ensure success  Although there is a great statistical chance of improvement or even resolution of most of her associated comorbidities, the results vary from patient to patient and they largely depend on her commitment and compliance       She needs to be at   325 lbs prior to the operation      RICH Patino   8/15/2019  9:28 AM

## 2019-08-30 ENCOUNTER — ANESTHESIA EVENT (OUTPATIENT)
Dept: GASTROENTEROLOGY | Facility: HOSPITAL | Age: 34
End: 2019-08-30

## 2019-08-30 ENCOUNTER — ANESTHESIA (OUTPATIENT)
Dept: GASTROENTEROLOGY | Facility: HOSPITAL | Age: 34
End: 2019-08-30

## 2019-08-30 ENCOUNTER — HOSPITAL ENCOUNTER (OUTPATIENT)
Dept: GASTROENTEROLOGY | Facility: HOSPITAL | Age: 34
Setting detail: OUTPATIENT SURGERY
Discharge: HOME | End: 2019-08-30
Attending: SURGERY | Admitting: SURGERY
Payer: COMMERCIAL

## 2019-08-30 VITALS
DIASTOLIC BLOOD PRESSURE: 84 MMHG | HEIGHT: 70 IN | HEART RATE: 70 BPM | WEIGHT: 293 LBS | OXYGEN SATURATION: 100 % | BODY MASS INDEX: 41.95 KG/M2 | TEMPERATURE: 97.9 F | SYSTOLIC BLOOD PRESSURE: 145 MMHG | RESPIRATION RATE: 18 BRPM

## 2019-08-30 DIAGNOSIS — E66.01 MORBID OBESITY (HCC): ICD-10-CM

## 2019-08-30 PROCEDURE — 88305 TISSUE EXAM BY PATHOLOGIST: CPT | Performed by: PATHOLOGY

## 2019-08-30 PROCEDURE — 43239 EGD BIOPSY SINGLE/MULTIPLE: CPT | Performed by: SURGERY

## 2019-08-30 PROCEDURE — 88342 IMHCHEM/IMCYTCHM 1ST ANTB: CPT | Performed by: PATHOLOGY

## 2019-08-30 RX ORDER — SODIUM CHLORIDE, SODIUM LACTATE, POTASSIUM CHLORIDE, CALCIUM CHLORIDE 600; 310; 30; 20 MG/100ML; MG/100ML; MG/100ML; MG/100ML
INJECTION, SOLUTION INTRAVENOUS CONTINUOUS PRN
Status: DISCONTINUED | OUTPATIENT
Start: 2019-08-30 | End: 2019-08-30 | Stop reason: SURG

## 2019-08-30 RX ORDER — PROPOFOL 10 MG/ML
INJECTION, EMULSION INTRAVENOUS AS NEEDED
Status: DISCONTINUED | OUTPATIENT
Start: 2019-08-30 | End: 2019-08-30 | Stop reason: SURG

## 2019-08-30 RX ORDER — SODIUM CHLORIDE, SODIUM LACTATE, POTASSIUM CHLORIDE, CALCIUM CHLORIDE 600; 310; 30; 20 MG/100ML; MG/100ML; MG/100ML; MG/100ML
125 INJECTION, SOLUTION INTRAVENOUS CONTINUOUS
Status: DISCONTINUED | OUTPATIENT
Start: 2019-08-30 | End: 2019-09-03 | Stop reason: HOSPADM

## 2019-08-30 RX ORDER — LIDOCAINE HYDROCHLORIDE 20 MG/ML
INJECTION, SOLUTION INFILTRATION; PERINEURAL AS NEEDED
Status: DISCONTINUED | OUTPATIENT
Start: 2019-08-30 | End: 2019-08-30 | Stop reason: SURG

## 2019-08-30 RX ADMIN — LIDOCAINE HYDROCHLORIDE 5 ML: 20 INJECTION, SOLUTION INFILTRATION; PERINEURAL at 08:46

## 2019-08-30 RX ADMIN — PROPOFOL 40 MG: 10 INJECTION, EMULSION INTRAVENOUS at 08:51

## 2019-08-30 RX ADMIN — PROPOFOL 250 MG: 10 INJECTION, EMULSION INTRAVENOUS at 08:49

## 2019-08-30 RX ADMIN — SODIUM CHLORIDE, SODIUM LACTATE, POTASSIUM CHLORIDE, AND CALCIUM CHLORIDE: .6; .31; .03; .02 INJECTION, SOLUTION INTRAVENOUS at 08:33

## 2019-08-30 NOTE — H&P
This is a 35 y o  female with a history of morbid obesity and Body mass index is 50 45 kg/m²  Here for an EGD to evaluate the anatomy of the GI tract and to rule out the presence of H  pylori  Physical Exam    Pulse 76   Temp 97 8 °F (36 6 °C) (Temporal)   Resp 16   Ht 5' 10" (1 778 m)   Wt (!) 160 kg (351 lb 10 1 oz)   SpO2 99%   BMI 50 45 kg/m²    AAOx3  RRR  CTA B  Abdomen obese  Benign  A/P:    This is a 35 y o  female with a history of morbid obesity and Body mass index is 50 45 kg/m²       Will proceed with the EGD and biopsies        Rafi Horn MD  8/30/2019  8:42 AM

## 2019-08-30 NOTE — DISCHARGE INSTRUCTIONS
Upper Endoscopy   WHAT YOU NEED TO KNOW:   An upper endoscopy is also called an upper gastrointestinal (GI) endoscopy, or an esophagogastroduodenoscopy (EGD)  You may feel bloated, gassy, or have some abdominal discomfort after your procedure  Your throat may be sore for 24 to 36 hours  You may burp or pass gas from air that is still inside your body  DISCHARGE INSTRUCTIONS:   Call 911 if:   · You have sudden chest pain or trouble breathing  Seek care immediately if:   · You feel dizzy or faint  · You have trouble swallowing  · You have severe throat pain  · Your bowel movements are very dark or black  · Your abdomen is hard and firm and you have severe pain  · You vomit blood  Contact your healthcare provider if:   · You feel full or bloated and cannot burp or pass gas  · You have not had a bowel movement for 3 days after your procedure  · You have neck pain  · You have a fever or chills  · You have nausea or are vomiting  · You have a rash or hives  · You have questions or concerns about your endoscopy  Relieve a sore throat:  Suck on throat lozenges or crushed ice  Gargle with a small amount of warm salt water  Mix 1 teaspoon of salt and 1 cup of warm water to make salt water  Relieve gas and discomfort from bloating:  Lie on your right side with a heating pad on your abdomen  Take short walks to help pass gas  Eat small meals until bloating is relieved  Rest after your procedure:  Do not drive or make important decisions until the day after your procedure  Return to your normal activity as directed  You can usually return to work the day after your procedure  Follow up with your healthcare provider as directed:  Write down your questions so you remember to ask them during your visits  © 2017 [employer]  Information is for End User's use only and may not be sold, redistributed or otherwise used for commercial purposes   All illustrations and images included in [employer] are the copyrighted property of [employer]  or [employer]  The above information is an  only  It is not intended as medical advice for individual conditions or treatments  Talk to your doctor, nurse or pharmacist before following any medical regimen to see if it is safe and effective for you

## 2019-08-30 NOTE — ANESTHESIA POSTPROCEDURE EVALUATION
Post-Op Assessment Note    CV Status:  Stable  Pain Score: 0    Pain management: adequate     Mental Status:  Sleepy   Hydration Status:  Euvolemic   PONV Controlled:  Controlled   Airway Patency:  Patent   Post Op Vitals Reviewed: Yes      Staff: CRNA           BP   149/72   Temp      Pulse  83   Resp   20   SpO2 97

## 2019-08-30 NOTE — ANESTHESIA PREPROCEDURE EVALUATION
Review of Systems/Medical History  Patient summary reviewed  Chart reviewed      Cardiovascular  Hypertension controlled,    Pulmonary  Negative pulmonary ROS        GI/Hepatic    GERD ,        Negative  ROS        Endo/Other     GYN  Negative gynecology ROS          Hematology  Negative hematology ROS      Musculoskeletal    Arthritis     Neurology  Negative neurology ROS      Psychology   Anxiety, Depression ,              Physical Exam    Airway    Mallampati score: II  TM Distance: >3 FB  Neck ROM: full     Dental   No notable dental hx     Cardiovascular  Rhythm: regular, Rate: normal, Cardiovascular exam normal    Pulmonary  Pulmonary exam normal Breath sounds clear to auscultation,     Other Findings        Anesthesia Plan  ASA Score- 2     Anesthesia Type- IV sedation with anesthesia with ASA Monitors  Additional Monitors:   Airway Plan:         Plan Factors-    Induction- intravenous  Postoperative Plan- Plan for postoperative opioid use  Informed Consent- Anesthetic plan and risks discussed with patient and spouse  I personally reviewed this patient with the CRNA  Discussed and agreed on the Anesthesia Plan with the CRNA  Georgette Ormond

## 2019-09-12 ENCOUNTER — CONSULT (OUTPATIENT)
Dept: CARDIOLOGY CLINIC | Facility: CLINIC | Age: 34
End: 2019-09-12
Payer: COMMERCIAL

## 2019-09-12 VITALS
SYSTOLIC BLOOD PRESSURE: 124 MMHG | WEIGHT: 293 LBS | HEART RATE: 77 BPM | HEIGHT: 70 IN | DIASTOLIC BLOOD PRESSURE: 84 MMHG | BODY MASS INDEX: 41.95 KG/M2

## 2019-09-12 DIAGNOSIS — E66.01 MORBID (SEVERE) OBESITY DUE TO EXCESS CALORIES (HCC): Primary | ICD-10-CM

## 2019-09-12 DIAGNOSIS — Z01.810 PREOP CARDIOVASCULAR EXAM: ICD-10-CM

## 2019-09-12 PROCEDURE — 99243 OFF/OP CNSLTJ NEW/EST LOW 30: CPT | Performed by: INTERNAL MEDICINE

## 2019-09-12 PROCEDURE — 93000 ELECTROCARDIOGRAM COMPLETE: CPT | Performed by: INTERNAL MEDICINE

## 2019-09-12 RX ORDER — MELATONIN 10 MG
TABLET, SUBLINGUAL SUBLINGUAL DAILY
COMMUNITY
End: 2020-02-20

## 2019-09-14 ENCOUNTER — TRANSCRIBE ORDERS (OUTPATIENT)
Dept: ADMINISTRATIVE | Facility: HOSPITAL | Age: 34
End: 2019-09-14

## 2019-09-14 DIAGNOSIS — G47.33 OBSTRUCTIVE SLEEP APNEA (ADULT) (PEDIATRIC): Primary | ICD-10-CM

## 2019-09-14 NOTE — PROGRESS NOTES
Consultation - Cardiology   Raheel Magana 35 y o  female MRN: [id number]  Unit/Bed#:  Encounter: [id number]  Physician Requesting Consult: No att  providers found  Reason for Consult / Principal Problem: Pre op cardiac evaluation    Assessment:  1  Pre op cardiac evaluation for bariatric surgery  2  Obesity    Plan:   Her exercise tolerance is very good and she has no cardiac symptoms  She has no history of cardiac problems and has no significant risk factors for CAD  Her ECG is unremarkable  I feel that her cardiac risk for bariatric surgery is low and she is cleared without further cardiac testing needed  History of Present Illness     HPI: Raheel Magana is a 35y o  year old female who is seen for cardiac evaluation prior to bariatric surgery  She is active and denies CP, SOB, palpitations ,dizziness, LE edema  She denies HTN, hypercholesterolemia, FH of CAD, smoking  She has borderline DM  He is able to walk 2-3 miles and go up 3 flights of stair without a problem  She did have a DVT and a pulmonary embolus in 2006 after a traumatic femur fracture from falling off of her horse  She has CELE and is being avaluated for CPAP  ECG today - NSR, non specific T wave abnl        Review of Systems:    Alert awake oriented, comfortable, denies any complaints  No fevers chills nausea vomiting  No weakness, dizziness, seizures  no cough, shortness of breath, or wheezing  Denies any palpitations, chest pain, diaphoresis  Denies leg edema, pain or paresthesias  Denies any skin rashes  Denies abdominal pain, bloody stools, masses  Denies any depression or suicidal ideations      Historical Information   Past Medical History:   Diagnosis Date    Anxiety     Back pain     Depressed     Femur fracture (HCC)     GERD (gastroesophageal reflux disease)     HPV (human papilloma virus) infection     Hypertension     Irritable bowel syndrome     Obesity     Pre-diabetes      Past Surgical History: Procedure Laterality Date    FEMUR FRACTURE SURGERY      WISDOM TOOTH EXTRACTION       Social History     Substance and Sexual Activity   Alcohol Use Yes    Frequency: Monthly or less     Social History     Substance and Sexual Activity   Drug Use No     Social History     Tobacco Use   Smoking Status Former Smoker    Packs/day: 0 00    Years: 0 00    Pack years: 0 00    Types: Cigarettes    Last attempt to quit: 2017    Years since quittin 7   Smokeless Tobacco Former User    Quit date: 2016     Family History: non-contributory    Meds/Allergies   all current active meds have been reviewed  No Known Allergies    Objective   Vitals: Blood pressure 124/84, pulse 77, height 5' 10" (1 778 m), weight (!) 161 kg (355 lb)  , Body mass index is 50 94 kg/m²  ,   Weight (last 2 days)     Date/Time   Weight    19 1820   (!) 161 (355)                        Physical Exam:  GEN: Senia Perea appears well, alert and oriented x 3, pleasant and cooperative   HEENT: pupils equal, round, and reactive to light; extraocular muscles intact  NECK: supple, no carotid bruits   HEART: regular rhythm, normal S1 and S2, no murmurs, clicks, gallops or rubs   LUNGS: clear to auscultation bilaterally; no wheezes, rales, or rhonchi   ABDOMEN: normal bowel sounds, soft, no tenderness, no distention  EXTREMITIES: peripheral pulses normal; no clubbing, cyanosis, or edema  NEURO: no focal findings   SKIN: normal without suspicious lesions on exposed skin    Lab Results:   Consult on 2019   Component Date Value Ref Range Status    INTERPRETATIONS 2019    Final    NSR  Non specific T wave abnl  Imaging: I have personally reviewed pertinent reports

## 2019-09-16 ENCOUNTER — OFFICE VISIT (OUTPATIENT)
Dept: GASTROENTEROLOGY | Facility: CLINIC | Age: 34
End: 2019-09-16
Payer: COMMERCIAL

## 2019-09-16 VITALS
SYSTOLIC BLOOD PRESSURE: 122 MMHG | DIASTOLIC BLOOD PRESSURE: 90 MMHG | BODY MASS INDEX: 41.95 KG/M2 | HEIGHT: 70 IN | WEIGHT: 293 LBS | HEART RATE: 107 BPM

## 2019-09-16 DIAGNOSIS — K64.9 HEMORRHOIDS, UNSPECIFIED HEMORRHOID TYPE: Primary | ICD-10-CM

## 2019-09-16 DIAGNOSIS — K59.00 CONSTIPATION, UNSPECIFIED CONSTIPATION TYPE: ICD-10-CM

## 2019-09-16 PROCEDURE — 99203 OFFICE O/P NEW LOW 30 MIN: CPT | Performed by: PHYSICIAN ASSISTANT

## 2019-09-16 NOTE — PATIENT INSTRUCTIONS
Hemorrhoids   WHAT YOU NEED TO KNOW:   Hemorrhoids are swollen blood vessels inside your rectum (internal hemorrhoids) or on your anus (external hemorrhoids)  Sometimes a hemorrhoid may prolapse  This means it extends out of your anus  DISCHARGE INSTRUCTIONS:   Return to the emergency department if:   · You have severe pain in your rectum or around your anus  · You have severe pain in your abdomen and you are vomiting  · You have bleeding from your anus that soaks through your underwear  Contact your healthcare provider if:   · You have frequent and painful bowel movements  · Your hemorrhoid looks or feels more swollen than usual      · You do not have a bowel movement for 2 days or more  · You see or feel tissue coming through your anus  · You have questions or concerns about your condition or care  Medicines: You may  need any of the following:  · A pad, cream, or ointment  can help decrease pain, swelling, and itching  · Stool softeners  help treat or prevent constipation  · NSAIDs , such as ibuprofen, help decrease swelling, pain, and fever  NSAIDs can cause stomach bleeding or kidney problems in certain people  If you take blood thinner medicine, always ask your healthcare provider if NSAIDs are safe for you  Always read the medicine label and follow directions  · Take your medicine as directed  Contact your healthcare provider if you think your medicine is not helping or if you have side effects  Tell him or her if you are allergic to any medicine  Keep a list of the medicines, vitamins, and herbs you take  Include the amounts, and when and why you take them  Bring the list or the pill bottles to follow-up visits  Carry your medicine list with you in case of an emergency  Manage your symptoms:   · Apply ice on your anus for 15 to 20 minutes every hour or as directed  Use an ice pack, or put crushed ice in a plastic bag   Cover it with a towel before you apply it to your anus  Ice helps prevent tissue damage and decreases swelling and pain  · Take a sitz bath  Fill a bathtub with 4 to 6 inches of warm water  You may also use a sitz bath pan that fits inside a toilet bowl  Sit in the sitz bath for 15 minutes  Do this 3 times a day, and after each bowel movement  The warm water can help decrease pain and swelling  · Keep your anal area clean  Gently wash the area with warm water daily  Soap may irritate the area  After a bowel movement, wipe with moist towelettes or wet toilet paper  Dry toilet paper can irritate the area  Prevent hemorrhoids:   · Do not strain to have a bowel movement  Do not sit on the toilet too long  These actions can increase pressure on the tissues in your rectum and anus  · Drink plenty of liquids  Liquids can help prevent constipation  Ask how much liquid to drink each day and which liquids are best for you  · Eat a variety of high-fiber foods  Examples include fruits, vegetables, and whole grains  Ask your healthcare provider how much fiber you need each day  You may need to take a fiber supplement  · Exercise as directed  Exercise, such as walking, may make it easier to have a bowel movement  Ask your healthcare provider to help you create an exercise plan  · Do not have anal sex  Anal sex can weaken the skin around your rectum and anus  · Avoid heavy lifting  This can cause straining and increase your risk for another hemorrhoid  Follow up with your healthcare provider as directed:  Write down your questions so you remember to ask them during your visits  © 2017 [facility] Information is for End User's use only and may not be sold, redistributed or otherwise used for commercial purposes  All illustrations and images included in CareNotes® are the copyrighted property of [employer] , [employer]  or [employer]  The above information is an  only   It is not intended as medical advice for individual conditions or treatments  Talk to your doctor, nurse or pharmacist before following any medical regimen to see if it is safe and effective for you

## 2019-09-16 NOTE — PROGRESS NOTES
[facility] - Outpatient Consultation  Henri Roca 35 y o  female MRN: [id number]  Encounter: [id number]          ASSESSMENT AND PLAN:      1  Hemorrhoids, unspecified hemorrhoid type  2  Constipation, unspecified constipation type  High-fiber diet reviewed and given  Will start Anusol-HC b i d   Encouraged significant water intake  Will hold on gastrointestinal procedures at this time  Will start over-the-counter Colace or MiraLax p r n     ______________________________________________________________________    HPI:    70-year-old female who is here with a chief complaint of hemorrhoids and constipation  Patient reports that she has suffered with constipation on and off for most of her life  She reports that she can go up to 3 days without having a BM  She reports that she has been having on and off issues with hemorrhoidal flare ups for some time now  She reports rectal itching and she  occasional rectal bleeding  She has tried over-the-counter tucks and preparation H with no significant benefit  Patient has never seen a gastroenterologist for this problem  Patient has never had colonoscopy  She does report that she is trying to drink more water  REVIEW OF SYSTEMS:    CONSTITUTIONAL: Denies any fever, chills, rigors, and weight loss  HEENT: No earache or tinnitus  Denies hearing loss or visual disturbances  CARDIOVASCULAR: No chest pain or palpitations  RESPIRATORY: Denies any cough, hemoptysis, shortness of breath or dyspnea on exertion  GASTROINTESTINAL: As noted in the History of Present Illness  GENITOURINARY: No problems with urination  Denies any hematuria or dysuria  NEUROLOGIC: No dizziness or vertigo, denies headaches  MUSCULOSKELETAL: Denies any muscle or joint pain  SKIN: Denies skin rashes or itching  ENDOCRINE: Denies excessive thirst  Denies intolerance to heat or cold  PSYCHOSOCIAL: Denies depression or anxiety   Denies any recent memory loss  Historical Information   Past Medical History:   Diagnosis Date    Anxiety     Back pain     Depressed     Femur fracture (HCC)     GERD (gastroesophageal reflux disease)     HPV (human papilloma virus) infection     Hypertension     Irritable bowel syndrome     Obesity     Pre-diabetes      Past Surgical History:   Procedure Laterality Date    FEMUR FRACTURE SURGERY      WISDOM TOOTH EXTRACTION       Social History   Social History     Substance and Sexual Activity   Alcohol Use Yes    Frequency: Monthly or less     Social History     Substance and Sexual Activity   Drug Use No     Social History     Tobacco Use   Smoking Status Former Smoker    Packs/day: 0 00    Years: 0 00    Pack years: 0 00    Types: Cigarettes    Last attempt to quit: 2017    Years since quittin 7   Smokeless Tobacco Former User    Quit date: 2016     Family History   Problem Relation Age of Onset    Rheum arthritis Mother     Fibroids Mother     Obesity Mother     Depression Mother     Cancer Mother     Diabetes Father     Hyperlipidemia Father     Hypertension Father     Thyroid disease Father     Cancer Father     Depression Sister     Depression Maternal Grandmother     Diabetes Maternal Grandfather     Depression Maternal Grandfather        Meds/Allergies       Current Outpatient Medications:     baclofen 20 mg tablet    buPROPion (WELLBUTRIN XL) 150 mg 24 hr tablet    Cholecalciferol (VITAMIN D3) 56389 units TABS    diclofenac (VOLTAREN) 75 mg EC tablet    escitalopram (LEXAPRO) 10 mg tablet    escitalopram (LEXAPRO) 20 mg tablet    MIRENA, 52 MG, 20 MCG/24HR IUD    Multiple Vitamin (MULTI VITAMIN PO)    ranitidine (ZANTAC) 150 mg tablet    No Known Allergies        Objective     Blood pressure 122/90, pulse (!) 107, height 5' 10" (1 778 m), weight (!) 160 kg (352 lb 12 8 oz)  Body mass index is 50 62 kg/m²          PHYSICAL EXAM:      General Appearance:   Alert, cooperative, no distress   HEENT:   Normocephalic, atraumatic, anicteric      Neck:  Supple, symmetrical, trachea midline   Lungs:   Clear to auscultation bilaterally; no rales, rhonchi or wheezing; respirations unlabored    Heart[de-identified]   Regular rate and rhythm; no murmur, rub, or gallop  Abdomen:   Soft, non-tender, non-distended; normal bowel sounds; no masses, no organomegaly    Genitalia:   Deferred    Rectal:   Deferred    Extremities:  No cyanosis, clubbing or edema    Pulses:  2+ and symmetric    Skin:  No jaundice, rashes, or lesions    Lymph nodes:  No palpable cervical lymphadenopathy        Lab Results:   No visits with results within 1 Day(s) from this visit  Latest known visit with results is:   Consult on 09/12/2019   Component Date Value    INTERPRETATIONS 09/14/2019           Radiology Results:   No results found

## 2019-09-16 NOTE — LETTER
September 18, 2019     Jared Kinney MD  [address] [place] [id number] [facility]  [address]    Patient: Luster Collet   YOB: 1985   Date of Visit: 9/16/2019       Dear Dr Pamela Lazo: Thank you for referring Jesica Chacon to me for evaluation  Below are my notes for this consultation  If you have questions, please do not hesitate to call me  I look forward to following your patient along with you  Sincerely,        Rigo Laboy PA-C        CC: No Recipients  Hai Shea  9/16/2019  3:04 PM  Attested  [facility] - Outpatient Consult[facility]  Luster Collet 35 y o  female MRN: [id number]  Encounter: [id number]          ASSESSMENT AND PLAN:      1  Hemorrhoids, unspecified hemorrhoid type  2  Constipation, unspecified constipation type  High-fiber diet reviewed and given  Will start Anusol-HC b i d   Encouraged significant water intake  Will hold on gastrointestinal procedures at this time  Will start over-the-counter Colace or MiraLax p r n     ______________________________________________________________________    HPI:    24-year-old female who is here with a chief complaint of hemorrhoids and constipation  Patient reports that she has suffered with constipation on and off for most of her life  She reports that she can go up to 3 days without having a BM  She reports that she has been having on and off issues with hemorrhoidal flare ups for some time now  She reports rectal itching and she  occasional rectal bleeding  She has tried over-the-counter tucks and preparation H with no significant benefit  Patient has never seen a gastroenterologist for this problem  Patient has never had colonoscopy  She does report that she is trying to drink more water  REVIEW OF SYSTEMS:    CONSTITUTIONAL: Denies any fever, chills, rigors, and weight loss  HEENT: No earache or tinnitus  Denies hearing loss or visual disturbances    CARDIOVASCULAR: No chest pain or palpitations  RESPIRATORY: Denies any cough, hemoptysis, shortness of breath or dyspnea on exertion  GASTROINTESTINAL: As noted in the History of Present Illness  GENITOURINARY: No problems with urination  Denies any hematuria or dysuria  NEUROLOGIC: No dizziness or vertigo, denies headaches  MUSCULOSKELETAL: Denies any muscle or joint pain  SKIN: Denies skin rashes or itching  ENDOCRINE: Denies excessive thirst  Denies intolerance to heat or cold  PSYCHOSOCIAL: Denies depression or anxiety  Denies any recent memory loss         Historical Information   Past Medical History:   Diagnosis Date    Anxiety     Back pain     Depressed     Femur fracture (HCC)     GERD (gastroesophageal reflux disease)     HPV (human papilloma virus) infection     Hypertension     Irritable bowel syndrome     Obesity     Pre-diabetes      Past Surgical History:   Procedure Laterality Date    FEMUR FRACTURE SURGERY      WISDOM TOOTH EXTRACTION       Social History   Social History     Substance and Sexual Activity   Alcohol Use Yes    Frequency: Monthly or less     Social History     Substance and Sexual Activity   Drug Use No     Social History     Tobacco Use   Smoking Status Former Smoker    Packs/day: 0 00    Years: 0 00    Pack years: 0 00    Types: Cigarettes    Last attempt to quit:     Years since quittin 7   Smokeless Tobacco Former User    Quit date: 2016     Family History   Problem Relation Age of Onset    Rheum arthritis Mother     Fibroids Mother     Obesity Mother     Depression Mother     Cancer Mother     Diabetes Father     Hyperlipidemia Father     Hypertension Father     Thyroid disease Father     Cancer Father     Depression Sister     Depression Maternal Grandmother     Diabetes Maternal Grandfather     Depression Maternal Grandfather        Meds/Allergies       Current Outpatient Medications:     baclofen 20 mg tablet    buPROPion (WELLBUTRIN XL) 150 mg 24 hr tablet    Cholecalciferol (VITAMIN D3) 02499 units TABS    diclofenac (VOLTAREN) 75 mg EC tablet    escitalopram (LEXAPRO) 10 mg tablet    escitalopram (LEXAPRO) 20 mg tablet    MIRENA, 52 MG, 20 MCG/24HR IUD    Multiple Vitamin (MULTI VITAMIN PO)    ranitidine (ZANTAC) 150 mg tablet    No Known Allergies        Objective     Blood pressure 122/90, pulse (!) 107, height 5' 10" (1 778 m), weight (!) 160 kg (352 lb 12 8 oz)  Body mass index is 50 62 kg/m²  PHYSICAL EXAM:      General Appearance:   Alert, cooperative, no distress   HEENT:   Normocephalic, atraumatic, anicteric      Neck:  Supple, symmetrical, trachea midline   Lungs:   Clear to auscultation bilaterally; no rales, rhonchi or wheezing; respirations unlabored    Heart[de-identified]   Regular rate and rhythm; no murmur, rub, or gallop  Abdomen:   Soft, non-tender, non-distended; normal bowel sounds; no masses, no organomegaly    Genitalia:   Deferred    Rectal:   Deferred    Extremities:  No cyanosis, clubbing or edema    Pulses:  2+ and symmetric    Skin:  No jaundice, rashes, or lesions    Lymph nodes:  No palpable cervical lymphadenopathy        Lab Results:   No visits with results within 1 Day(s) from this visit  Latest known visit with results is:   Consult on 09/12/2019   Component Date Value    INTERPRETATIONS 09/14/2019           Radiology Results:   No results found  Attestation signed by Jordana Del Valle DO at 9/16/2019  9:16 PM:  I have reviewed the chart  I supervised my physician assistant and I agree with her assessment and plan

## 2019-09-19 ENCOUNTER — HOSPITAL ENCOUNTER (OUTPATIENT)
Dept: SLEEP CENTER | Facility: CLINIC | Age: 34
Discharge: HOME | End: 2019-09-19
Payer: COMMERCIAL

## 2019-09-19 DIAGNOSIS — G47.33 OBSTRUCTIVE SLEEP APNEA (ADULT) (PEDIATRIC): ICD-10-CM

## 2019-09-19 DIAGNOSIS — G47.33 OSA (OBSTRUCTIVE SLEEP APNEA): ICD-10-CM

## 2019-09-19 PROCEDURE — 95811 POLYSOM 6/>YRS CPAP 4/> PARM: CPT

## 2019-09-20 ENCOUNTER — TRANSCRIBE ORDERS (OUTPATIENT)
Dept: SLEEP CENTER | Facility: CLINIC | Age: 34
End: 2019-09-20

## 2019-09-20 DIAGNOSIS — G47.33 OSA (OBSTRUCTIVE SLEEP APNEA): Primary | ICD-10-CM

## 2019-09-20 NOTE — PROGRESS NOTES
Sleep Study Documentation    Pre-Sleep Study       Sleep testing procedure explained to patient:YES    Patient napped prior to study:NO    Caffeine:Dayshift worker after 12PM   Caffeine use:YES- ice tea  12 to 26 ounces    Alcohol:Dayshift workers after 5PM: Alcohol use:NO    Typical day for patient:YES       Study Documentation    Sleep Study Indications: Snore,EDS, Witnessed Apnea    Sleep Study: Split Optimal PAP pressure: 9  Leak:None  Snore:Eliminated  REM Obtained:yes  Supplemental O2: no    Minimum SaO2 84  Baseline SaO2 95  PAP mask tried (list all)Wisp  PAP mask choice (final)Wisp  PAP mask type:nasal  PAP pressure at which snoring was eliminated 9  Minimum SaO2 at final PAP pressure 94  Mode of Therapy:CPAP  ETCO2:No  CPAP changed to BiPAP:No    Mode of Therapy:CPAP    EKG abnormalities: no     EEG abnormalities: no    Sleep Study Recorded < 2 hours: N/A    Sleep Study Recorded > 2 hours but incomplete study: N/A    Sleep Study Recorded 6 hours but no sleep obtained: NO    Patient classification: employed       Post-Sleep Study    Medication used at bedtime or during sleep study:YES prescription sleep aid    Patient reports time it took to fall asleep:30 to 60 minutes    Patient reports waking up during study:3 or more times  Patient reports returning to sleep without difficulty  Patient reports sleeping 4 to 6 hours without dreaming  Patient reports sleep during study:better than usual    Patient rated sleepiness: Somewhat sleepy or tired    PAP treatment:yes: Post PAP treatment patient reports feeling unsure if a change is noted and  would wear PAP mask at home

## 2019-09-24 ENCOUNTER — TELEPHONE (OUTPATIENT)
Dept: SLEEP CENTER | Facility: CLINIC | Age: 34
End: 2019-09-24

## 2019-09-24 RX ORDER — ZOLPIDEM TARTRATE 5 MG/1
TABLET ORAL
Refills: 0 | COMMUNITY
Start: 2019-09-18 | End: 2019-09-28

## 2019-09-25 NOTE — TELEPHONE ENCOUNTER
Discussed study results with patient- she saw Dr Alexandro Interiano in his private office & has follow-up scheduled there next week

## 2019-09-28 ENCOUNTER — OFFICE VISIT (OUTPATIENT)
Dept: URGENT CARE | Facility: CLINIC | Age: 34
End: 2019-09-28
Payer: COMMERCIAL

## 2019-09-28 VITALS
WEIGHT: 293 LBS | TEMPERATURE: 97.3 F | BODY MASS INDEX: 41.95 KG/M2 | HEIGHT: 70 IN | DIASTOLIC BLOOD PRESSURE: 94 MMHG | RESPIRATION RATE: 18 BRPM | HEART RATE: 89 BPM | OXYGEN SATURATION: 97 % | SYSTOLIC BLOOD PRESSURE: 138 MMHG

## 2019-09-28 DIAGNOSIS — R10.30 LOWER ABDOMINAL PAIN: Primary | ICD-10-CM

## 2019-09-28 LAB
SL AMB  POCT GLUCOSE, UA: NEGATIVE
SL AMB LEUKOCYTE ESTERASE,UA: NEGATIVE
SL AMB POCT BILIRUBIN,UA: NEGATIVE
SL AMB POCT BLOOD,UA: ABNORMAL
SL AMB POCT CLARITY,UA: CLEAR
SL AMB POCT COLOR,UA: ABNORMAL
SL AMB POCT KETONES,UA: NEGATIVE
SL AMB POCT NITRITE,UA: NEGATIVE
SL AMB POCT PH,UA: 6.5
SL AMB POCT SPECIFIC GRAVITY,UA: 1.01
SL AMB POCT URINE HCG: NEGATIVE
SL AMB POCT URINE PROTEIN: NEGATIVE
SL AMB POCT UROBILINOGEN: 0.2

## 2019-09-28 PROCEDURE — 87086 URINE CULTURE/COLONY COUNT: CPT | Performed by: PHYSICIAN ASSISTANT

## 2019-09-28 PROCEDURE — 81002 URINALYSIS NONAUTO W/O SCOPE: CPT | Performed by: PHYSICIAN ASSISTANT

## 2019-09-28 PROCEDURE — 81025 URINE PREGNANCY TEST: CPT | Performed by: PHYSICIAN ASSISTANT

## 2019-09-28 PROCEDURE — 99213 OFFICE O/P EST LOW 20 MIN: CPT | Performed by: PHYSICIAN ASSISTANT

## 2019-09-28 PROCEDURE — S9088 SERVICES PROVIDED IN URGENT: HCPCS | Performed by: PHYSICIAN ASSISTANT

## 2019-09-28 NOTE — LETTER
September 28, 2019     Patient: Frantz Mcdermott   YOB: 1985   Date of Visit: 9/28/2019       To Whom it May Concern:    Patrice Loja was seen in my clinic on 9/28/2019  Please excuse patient from school on 09/28/2019  If you have any questions or concerns, please don't hesitate to call           Sincerely,          Marcelino Echeverria PA-C        CC: No Recipients

## 2019-09-28 NOTE — PROGRESS NOTES
[facility]        NAME: Nima Benton is a 35 y o  female  : 1985    MRN: [id number]  DATE: 2019  TIME: 5:14 PM    Assessment and Plan   Lower abdominal pain [R10 30]  1  Lower abdominal pain  POCT urine dip    POCT urine HCG    Urine culture       Will follow up with urine culture when available  If pain continues proceed to ER    Patient Instructions       Follow up with PCP in 3-5 days  Proceed to  ER if symptoms worsen  Chief Complaint     Chief Complaint   Patient presents with    Abdominal Pain     complains of lower abdominal pain that has been intermittent since Tue/Wed  also complains of nausea with a mix of diarrhea/constipation  History of Present Illness       79-year-old female presents with for evaluation abdominal pain  Patient complaining of lower abdominal pain for 5 days  Patient complains of intermittent and sharp pain  Patient complains of associated nausea and diarrhea and constipation  She states there is no pattern, sometimes she has diarrhea and sometimes she does not have a bowel movement for 3-4 days  Patient has no history of IBS  Patient denies urinary symptoms  Denies flank pain, fever or chills  She denies vomiting  Denies bright red blood per rectum  Review of Systems   Review of Systems   Constitutional: Negative for chills, fatigue and fever  HENT: Negative for congestion, ear pain, sinus pain, sore throat and trouble swallowing  Eyes: Negative for pain, discharge and redness  Respiratory: Negative for cough, chest tightness, shortness of breath and wheezing  Cardiovascular: Negative for chest pain, palpitations and leg swelling  Gastrointestinal: Positive for abdominal pain, constipation, diarrhea and nausea  Negative for blood in stool, rectal pain and vomiting  Musculoskeletal: Negative for arthralgias, joint swelling and myalgias  Skin: Negative for rash     Neurological: Negative for dizziness, weakness, numbness and headaches  Current Medications       Current Outpatient Medications:     baclofen 20 mg tablet, as needed , Disp: , Rfl:     buPROPion (WELLBUTRIN XL) 150 mg 24 hr tablet, Take 150 mg by mouth, Disp: , Rfl:     Cholecalciferol (VITAMIN D3) 91519 units TABS, Take by mouth daily, Disp: , Rfl:     diclofenac (VOLTAREN) 75 mg EC tablet, as needed , Disp: , Rfl:     escitalopram (LEXAPRO) 10 mg tablet, , Disp: , Rfl:     hydrocortisone (ANUSOL-HC, PROCTOSOL HC,) 2 5 % rectal cream, Insert into the rectum 2 (two) times a day, Disp: 30 g, Rfl: 0    MIRENA, 52 MG, 20 MCG/24HR IUD, TO BE INSERTED ONE TIME BY PRESCRIBER   ROUTE INTRAUTERINE , Disp: , Rfl: 0    Multiple Vitamin (MULTI VITAMIN PO), Take by mouth daily, Disp: , Rfl:     ranitidine (ZANTAC) 150 mg tablet, Take 150 mg by mouth as needed , Disp: , Rfl:     Current Allergies     Allergies as of 09/28/2019    (No Known Allergies)            The following portions of the patient's history were reviewed and updated as appropriate: allergies, current medications, past family history, past medical history, past social history, past surgical history and problem list      Past Medical History:   Diagnosis Date    Anxiety     Back pain     Depressed     Femur fracture ([facility] [address] )     GERD (gastroesophageal reflux disease)     HPV (human papilloma virus) infection     Hypertension     Irritable bowel syndrome     Morbid obesity with BMI of 50 0-59 9, adult ([address] )     Obesity     Pre-diabetes     Sleep apnea        Past Surgical History:   Procedure Laterality Date    FEMUR FRACTURE SURGERY      WISDOM TOOTH EXTRACTION         Family History   Problem Relation Age of Onset    Rheum arthritis Mother     Fibroids Mother     Obesity Mother     Depression Mother     Cancer Mother     Hypertension Mother     Diabetes Father     Hyperlipidemia Father     Hypertension Father     Thyroid disease Father     Cancer Father    [facility] Depression Sister     Depression Maternal Grandmother     Diabetes Maternal Grandfather     Depression Maternal Grandfather          Medications have been verified  Objective   /94 (BP Location: Left arm, Patient Position: Sitting)   Pulse 89   Temp (!) 97 3 °F (36 3 °C) (Tympanic)   Resp 18   Ht 5' 10" (1 778 m)   Wt (!) 159 kg (350 lb)   SpO2 97%   BMI 50 22 kg/m²        Physical Exam     Physical Exam   Constitutional: Vital signs are normal  She appears well-developed and well-nourished  No distress  Cardiovascular: Normal rate, regular rhythm and intact distal pulses  Pulmonary/Chest: Effort normal and breath sounds normal    Abdominal: Soft  Normal appearance and bowel sounds are normal  There is no tenderness  There is no rigidity, no rebound, no guarding, no CVA tenderness, no tenderness at McBurney's point and negative Su's sign  No hernia  Skin: Skin is warm, dry and intact  Nursing note and vitals reviewed

## 2019-09-29 LAB — BACTERIA UR CULT: NORMAL

## 2019-10-03 ENCOUNTER — OFFICE VISIT (OUTPATIENT)
Dept: BARIATRICS | Facility: CLINIC | Age: 34
End: 2019-10-03
Payer: COMMERCIAL

## 2019-10-03 VITALS
DIASTOLIC BLOOD PRESSURE: 84 MMHG | HEIGHT: 70 IN | HEART RATE: 96 BPM | WEIGHT: 293 LBS | TEMPERATURE: 99.9 F | SYSTOLIC BLOOD PRESSURE: 122 MMHG | BODY MASS INDEX: 41.95 KG/M2

## 2019-10-03 DIAGNOSIS — M19.90 DJD (DEGENERATIVE JOINT DISEASE): ICD-10-CM

## 2019-10-03 DIAGNOSIS — K75.81 NASH (NONALCOHOLIC STEATOHEPATITIS): ICD-10-CM

## 2019-10-03 DIAGNOSIS — E66.01 MORBID (SEVERE) OBESITY DUE TO EXCESS CALORIES (HCC): Primary | ICD-10-CM

## 2019-10-03 DIAGNOSIS — E66.1 CLASS 3 DRUG-INDUCED OBESITY WITH SERIOUS COMORBIDITY AND BODY MASS INDEX (BMI) OF 50.0 TO 59.9 IN ADULT (HCC): ICD-10-CM

## 2019-10-03 DIAGNOSIS — K21.9 GERD (GASTROESOPHAGEAL REFLUX DISEASE): ICD-10-CM

## 2019-10-03 DIAGNOSIS — G47.33 OBSTRUCTIVE SLEEP APNEA (ADULT) (PEDIATRIC): ICD-10-CM

## 2019-10-03 PROCEDURE — 99203 OFFICE O/P NEW LOW 30 MIN: CPT | Performed by: SURGERY

## 2019-10-03 RX ORDER — TOPIRAMATE 50 MG/1
50 TABLET, FILM COATED ORAL
Qty: 30 TABLET | Refills: 0 | Status: SHIPPED | OUTPATIENT
Start: 2019-10-03 | End: 2019-10-30

## 2019-10-03 RX ORDER — PHENTERMINE HYDROCHLORIDE 15 MG/1
15 CAPSULE ORAL EVERY MORNING
Qty: 30 CAPSULE | Refills: 0 | Status: SHIPPED | OUTPATIENT
Start: 2019-10-03 | End: 2019-10-30 | Stop reason: ALTCHOICE

## 2019-10-03 NOTE — PROGRESS NOTES
BARIATRIC INITIAL CONSULT - BARIATRIC SURGERY    Cintia Finley 35 y o  female MRN: [id number]  Unit/Bed#:  Encounter: [id number]      HPI:  Cintia Finley is a 35 y o  female who presents with a longstanding history of morbid obesity and inability to sustain a meaningful weight loss  She is MA at the [employer]  She desires to pursue metabolic and bariatric surgery to improve her energy and perform the activities that she used to perform in the past  She desires to be free of back and knee pain also  NSAIDs intermittently but states she will start taking tylenol  History of DVT when she was 20 yo secondary to a femur fracture  Here today to discuss bariatric options  Visit type: initial visit    Symptoms: inability to loss weight, knee pain, back pain and joint pain    Associated Symptoms: depressed mood    Associated Conditions: low HDL, sleep apnea and abdominal obesity  Disease Complications: sleep apnea, osteoarthritis and liver steatosis  Weight Loss Interest: high    Exercise Frequency:daily  Types of Exercise: walking      Review of Systems   Musculoskeletal: Positive for arthralgias, back pain and joint swelling  Neurological: Positive for headaches  All other systems reviewed and are negative        Historical Information   Past Medical History:   Diagnosis Date    Anxiety     Back pain     Depressed     Femur fracture (HCC)     GERD (gastroesophageal reflux disease)     HPV (human papilloma virus) infection     Hypertension     Irritable bowel syndrome     Morbid obesity with BMI of 50 0-59 9, adult (HCC)     Obesity     Pre-diabetes     Sleep apnea      Past Surgical History:   Procedure Laterality Date    FEMUR FRACTURE SURGERY      WISDOM TOOTH EXTRACTION       Social History   Social History     Substance and Sexual Activity   Alcohol Use Yes    Frequency: Monthly or less    Comment: rarely     Social History     Substance and Sexual Activity   Drug Use No     Social History     Tobacco Use   Smoking Status Former Smoker    Packs/day: 0 00    Years: 0 00    Pack years: 0 00    Types: Cigarettes    Last attempt to quit: 2017    Years since quittin 7   Smokeless Tobacco Former User    Quit date: 2016     Family History: Morbid obesity - mother s/p RYGB addiction    Meds/Allergies   all medications and allergies reviewed  No Known Allergies    Objective       Current Vitals:   /84 (BP Location: Left arm, Patient Position: Sitting, Cuff Size: Large)   Pulse 96   Temp 99 9 °F (37 7 °C) (Tympanic)   Ht 5' 10" (1 778 m)   Wt (!) 161 kg (355 lb 6 4 oz)   BMI 50 99 kg/m²       Invasive Devices     None                 Physical Exam   Constitutional: She is oriented to person, place, and time  She appears well-developed and well-nourished  HENT:   Head: Atraumatic  Eyes: EOM are normal    Neck: Neck supple  Cardiovascular: Normal rate  Pulmonary/Chest: Effort normal    Abdominal: Soft  She exhibits no distension  There is no tenderness  Musculoskeletal: Normal range of motion  Neurological: She is alert and oriented to person, place, and time  Skin: Skin is warm and dry  Psychiatric: She has a normal mood and affect  Her behavior is normal    Vitals reviewed  Lab Results: I have personally reviewed pertinent lab results  Imaging: I have personally reviewed pertinent reports  EKG, Pathology, and Other Studies: I have personally reviewed pertinent reports  Assessment/PLAN:    35 y o  yo female with a long standing h/o of obesity and inability to sustain any meaningful weight loss on her own despite several attempts  She is interested in the Laparoscopic lloyd-en-y gastric bypass  As a part of her pre op evaluation, she will be referred to a cardiologist and for a sleep evaluation and consult  She needs an EGD to evaluate the anatomy of her GI tract prior to the operation    I have spent over 30 minutes with her face to face in the office today discussing her options and details of the surgery  We have seen an animation of the surgery on the computer that illustrates how the operation is done and how the anatomy will be altered with the procedure  Over 50% of this was coordinating care  She was given the opportunity to ask questions and I have answered all of them  I have discussed and educated the patient with regards to the components of our multidisciplinary program and the importance of compliance and follow up in the post operative period  The patient was also instructed with regards to the importance of behavior modification, nutritional counseling, support meeting attendance and lifestyle changes that are important to ensure success  Although there is a great statistical chance of improvement or even resolution of most of her associated comorbidities, the results vary from patient to patient and they largely depend on her commitment and compliance  She needs to lose 35 lbs (320) prior to the operation  Started on phentermine and topiramate to assist with weight loss   She has tolerated these medications well in the past      Eugenia Deluca MD  10/3/2019  1:45 PM

## 2019-10-15 ENCOUNTER — APPOINTMENT (OUTPATIENT)
Dept: LAB | Facility: HOSPITAL | Age: 34
End: 2019-10-15
Attending: SURGERY
Payer: COMMERCIAL

## 2019-10-15 DIAGNOSIS — E66.01 MORBID (SEVERE) OBESITY DUE TO EXCESS CALORIES (HCC): ICD-10-CM

## 2019-10-15 LAB
ALBUMIN SERPL BCP-MCNC: 3.5 G/DL (ref 3.5–5)
ALP SERPL-CCNC: 100 U/L (ref 46–116)
ALT SERPL W P-5'-P-CCNC: 23 U/L (ref 12–78)
ANION GAP SERPL CALCULATED.3IONS-SCNC: 6 MMOL/L (ref 4–13)
AST SERPL W P-5'-P-CCNC: 11 U/L (ref 5–45)
BILIRUB SERPL-MCNC: 0.4 MG/DL (ref 0.2–1)
BUN SERPL-MCNC: 18 MG/DL (ref 5–25)
CALCIUM SERPL-MCNC: 8.8 MG/DL (ref 8.3–10.1)
CHLORIDE SERPL-SCNC: 107 MMOL/L (ref 100–108)
CHOLEST SERPL-MCNC: 134 MG/DL (ref 50–200)
CO2 SERPL-SCNC: 28 MMOL/L (ref 21–32)
CREAT SERPL-MCNC: 0.94 MG/DL (ref 0.6–1.3)
ERYTHROCYTE [DISTWIDTH] IN BLOOD BY AUTOMATED COUNT: 12.7 % (ref 11.6–15.1)
EST. AVERAGE GLUCOSE BLD GHB EST-MCNC: 114 MG/DL
GFR SERPL CREATININE-BSD FRML MDRD: 80 ML/MIN/1.73SQ M
GLUCOSE P FAST SERPL-MCNC: 91 MG/DL (ref 65–99)
HBA1C MFR BLD: 5.6 % (ref 4.2–6.3)
HCT VFR BLD AUTO: 43.4 % (ref 34.8–46.1)
HDLC SERPL-MCNC: 38 MG/DL (ref 40–60)
HGB BLD-MCNC: 14.2 G/DL (ref 11.5–15.4)
LDLC SERPL CALC-MCNC: 77 MG/DL (ref 0–100)
MCH RBC QN AUTO: 29.6 PG (ref 26.8–34.3)
MCHC RBC AUTO-ENTMCNC: 32.7 G/DL (ref 31.4–37.4)
MCV RBC AUTO: 90 FL (ref 82–98)
NONHDLC SERPL-MCNC: 96 MG/DL
PLATELET # BLD AUTO: 289 THOUSANDS/UL (ref 149–390)
PMV BLD AUTO: 10.4 FL (ref 8.9–12.7)
POTASSIUM SERPL-SCNC: 3.9 MMOL/L (ref 3.5–5.3)
PROT SERPL-MCNC: 7.1 G/DL (ref 6.4–8.2)
RBC # BLD AUTO: 4.8 MILLION/UL (ref 3.81–5.12)
SODIUM SERPL-SCNC: 141 MMOL/L (ref 136–145)
TRIGL SERPL-MCNC: 94 MG/DL
TSH SERPL DL<=0.05 MIU/L-ACNC: 3.64 UIU/ML (ref 0.36–3.74)
WBC # BLD AUTO: 10.38 THOUSAND/UL (ref 4.31–10.16)

## 2019-10-15 PROCEDURE — 80053 COMPREHEN METABOLIC PANEL: CPT

## 2019-10-15 PROCEDURE — 85027 COMPLETE CBC AUTOMATED: CPT

## 2019-10-15 PROCEDURE — 80061 LIPID PANEL: CPT

## 2019-10-15 PROCEDURE — 83036 HEMOGLOBIN GLYCOSYLATED A1C: CPT

## 2019-10-15 PROCEDURE — 36415 COLL VENOUS BLD VENIPUNCTURE: CPT

## 2019-10-15 PROCEDURE — 84443 ASSAY THYROID STIM HORMONE: CPT

## 2019-10-22 DIAGNOSIS — E66.01 MORBID (SEVERE) OBESITY DUE TO EXCESS CALORIES (HCC): ICD-10-CM

## 2019-10-23 DIAGNOSIS — E66.01 MORBID (SEVERE) OBESITY DUE TO EXCESS CALORIES (HCC): ICD-10-CM

## 2019-10-23 RX ORDER — TOPIRAMATE 50 MG/1
50 TABLET, FILM COATED ORAL
Qty: 30 TABLET | Refills: 0 | OUTPATIENT
Start: 2019-10-23

## 2019-10-23 RX ORDER — PHENTERMINE HYDROCHLORIDE 15 MG/1
15 CAPSULE ORAL EVERY MORNING
Qty: 30 CAPSULE | Refills: 0 | OUTPATIENT
Start: 2019-10-23

## 2019-10-23 NOTE — TELEPHONE ENCOUNTER
Why is she is asking for a refill already? Her previous prescription should take her to Nov 3rd  I want someone to see her in the office and see how she is doing on the current medication dosing (ie how is she feeling, how much weight has she lost)  After that we can refill the medication or adjust her dosing

## 2019-10-25 ENCOUNTER — ANNUAL EXAM (OUTPATIENT)
Dept: OBGYN CLINIC | Facility: CLINIC | Age: 34
End: 2019-10-25
Payer: COMMERCIAL

## 2019-10-25 VITALS
HEIGHT: 70 IN | SYSTOLIC BLOOD PRESSURE: 110 MMHG | DIASTOLIC BLOOD PRESSURE: 82 MMHG | BODY MASS INDEX: 41.95 KG/M2 | WEIGHT: 293 LBS

## 2019-10-25 DIAGNOSIS — Z01.419 ENCOUNTER FOR GYNECOLOGICAL EXAMINATION (GENERAL) (ROUTINE) WITHOUT ABNORMAL FINDINGS: Primary | ICD-10-CM

## 2019-10-25 DIAGNOSIS — Z30.431 IUD CHECK UP: ICD-10-CM

## 2019-10-25 PROCEDURE — G0145 SCR C/V CYTO,THINLAYER,RESCR: HCPCS | Performed by: NURSE PRACTITIONER

## 2019-10-25 PROCEDURE — 87624 HPV HI-RISK TYP POOLED RSLT: CPT | Performed by: NURSE PRACTITIONER

## 2019-10-25 PROCEDURE — 99395 PREV VISIT EST AGE 18-39: CPT | Performed by: NURSE PRACTITIONER

## 2019-10-25 RX ORDER — DOCUSATE SODIUM 100 MG/1
100 CAPSULE, LIQUID FILLED ORAL 2 TIMES DAILY
COMMUNITY

## 2019-10-25 RX ORDER — FAMOTIDINE 10 MG
10 TABLET ORAL 2 TIMES DAILY
COMMUNITY
End: 2019-12-31 | Stop reason: HOSPADM

## 2019-10-25 NOTE — PROGRESS NOTES
Diagnoses and all orders for this visit:    1  Encounter for gynecological examination (general) (routine) without abnormal findings  -     Liquid-based pap, screening  Pap collected today, discussed new guidelines  Safe sex practices and condom use encouraged  Healthy eating and nutrition, daily exercise discussed and SBE reinforced  Call with any issues and all questions and concerns addressed  2  IUD check up  -     US pelvis complete w transvaginal; Future  As long as in uterus, working well  Patient instructed after US, she can monitor and any change in bleeding or pain to call  May need to see physician for removal       Other orders  -     famotidine (PEPCID) 10 mg tablet; Take 10 mg by mouth 2 (two) times a day  -     docusate sodium (COLACE) 100 mg capsule; Take 100 mg by mouth 2 (two) times a day      All questions and concerns answered  Patient to call with any questions  Pleasant 35 y o  premenopausal female here for annual exam  She denies any issues with bleeding or her menses  Has Mirena IUD in, some occasional spotting, happy with this  Was told that her IUD strings maybe too short  Denies history of abnormal pap smears  Last Pap 2018, abnormal, colpo done a sn was negative  So pap due today  Denies vaginal, urinary or breast issues, today  Denies pelvic pain  Denies any issues with her BCM, which is Mirena IUD  Sexually active without any concerns and pt declines STD testing  Denies F/C/N/V      Past Medical History:   Diagnosis Date    Abnormal Pap smear of cervix     Anxiety     Back pain     Depressed     Femur fracture (HCC)     GERD (gastroesophageal reflux disease)     HPV (human papilloma virus) infection     Hypertension     Irritable bowel syndrome     Morbid obesity with BMI of 50 0-59 9, adult (HCC)     Obesity     Pre-diabetes     Sleep apnea      Past Surgical History:   Procedure Laterality Date    COLPOSCOPY  9/2018    FEMUR FRACTURE SURGERY  WISDOM TOOTH EXTRACTION       Family History   Problem Relation Age of Onset    Rheum arthritis Mother     Fibroids Mother     Obesity Mother     Depression Mother     Cancer Mother     Hypertension Mother     Diabetes Father     Hyperlipidemia Father     Hypertension Father     Thyroid disease Father     Cancer Father     Depression Sister     Depression Maternal Grandmother     Diabetes Maternal Grandfather     Depression Maternal Grandfather      Social History     Tobacco Use    Smoking status: Former Smoker     Packs/day: 0 00     Years: 0 00     Pack years: 0 00     Types: Cigarettes     Last attempt to quit: 2017     Years since quittin 8    Smokeless tobacco: Former User     Quit date: 2016   Substance Use Topics    Alcohol use: Yes     Frequency: Monthly or less     Comment: rarely    Drug use: No       Current Outpatient Medications:     baclofen 20 mg tablet, as needed , Disp: , Rfl:     buPROPion (WELLBUTRIN XL) 150 mg 24 hr tablet, Take 150 mg by mouth, Disp: , Rfl:     Cholecalciferol (VITAMIN D3) 93246 units TABS, Take by mouth daily, Disp: , Rfl:     diclofenac (VOLTAREN) 75 mg EC tablet, as needed , Disp: , Rfl:     docusate sodium (COLACE) 100 mg capsule, Take 100 mg by mouth 2 (two) times a day, Disp: , Rfl:     escitalopram (LEXAPRO) 10 mg tablet, , Disp: , Rfl:     famotidine (PEPCID) 10 mg tablet, Take 10 mg by mouth 2 (two) times a day, Disp: , Rfl:     hydrocortisone (ANUSOL-HC, PROCTOSOL HC,) 2 5 % rectal cream, Insert into the rectum 2 (two) times a day, Disp: 30 g, Rfl: 0    MIRENA, 52 MG, 20 MCG/24HR IUD, TO BE INSERTED ONE TIME BY PRESCRIBER   ROUTE INTRAUTERINE , Disp: , Rfl: 0    Multiple Vitamin (MULTI VITAMIN PO), Take by mouth daily, Disp: , Rfl:     phentermine 15 MG capsule, Take 1 capsule (15 mg total) by mouth every morning, Disp: 30 capsule, Rfl: 0    topiramate (TOPAMAX) 50 MG tablet, Take 1 tablet (50 mg total) by mouth daily at bedtime, Disp: 30 tablet, Rfl: 0    ranitidine (ZANTAC) 150 mg tablet, Take 150 mg by mouth as needed , Disp: , Rfl:   Patient Active Problem List    Diagnosis Date Noted    Obstructive sleep apnea (adult) (pediatric)     Preop cardiovascular exam 2019    Morbid (severe) obesity due to excess calories (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  Vitals:    10/25/19 1630   BP: 110/82   BP Location: Left arm   Patient Position: Sitting   Cuff Size: Large   Weight: (!) 161 kg (355 lb 6 oz)   Height: 5' 10" (1 778 m)     Body mass index is 50 99 kg/m²  Review of Systems   Constitutional: Negative for chills, fatigue, fever and unexpected weight change  Respiratory: Negative for shortness of breath  Gastrointestinal: Negative for anal bleeding, blood in stool, constipation and diarrhea  Genitourinary: Negative for difficulty urinating, dysuria and hematuria  OBGyn Exam    Physical Exam   Constitutional: She appears well-developed and well-nourished  No distress  Head: Normocephalic  Neck: Normal range of motion  Neck supple  Pulmonary: Effort normal   Breasts: bilateral without masses, skin changes or nipple discharge  Bilaterally soft and warm to touch  No areas of erythema or pain  Abdominal: Soft  Non-tender, obese  Pelvic exam was performed with patient supine  No labial fusion  There is no rash, tenderness, lesion or injury on the right labia  There is no rash, tenderness, lesion or injury on the left labia  Uterus is not deviated, not enlarged, not fixed and not tender   Cervix exhibits no motion tenderness, no discharge and no friability, NO IUD STRINGS SEEN  Right adnexum displays no mass, no tenderness and no fullness  Left adnexum displays no mass, no tenderness and no fullness  No erythema or tenderness in the vagina  No foreign body in the vagina  No signs of injury around the vagina  No vaginal discharge found  PAP collected w/o difficulty, green spec used  Lymphadenopathy:        Right: No inguinal adenopathy present          Left: No inguinal adenopathy present

## 2019-10-25 NOTE — PATIENT INSTRUCTIONS
Pap Smear   GENERAL INFORMATION:   What is a Pap smear? A Pap smear, or Pap test, is a procedure to check your cervix for abnormal cells  The cervix is the narrow opening at the bottom of your uterus  The cervix meets the top part of the vagina  How do I prepare for a Pap smear? The best time to schedule the test is right after your period stops  Do not have a Pap smear during your monthly period  Do not have intercourse or put anything in your vagina for 24 hours before your test    What will happen during a Pap smear? · You will lie on your back and place your feet on footrests called stirrups  Your caregiver will gently insert a device called a speculum into your vagina  The speculum is used to spread the walls of your vagina so he can see your cervix  He will use a thin brush or cotton swab to collect cells from the inside of your cervix  · Your caregiver will also collect cells from the surface of your cervix with a plastic or wooden tool called a spatula  He may also gently scrape the upper part of your vagina for a sample  The samples are placed in a container with liquid or on a glass slide  They are sent to a lab and examined for abnormal cells  How often do I need a Pap smear? Pap smears are usually done every 1 to 3 years  You may need a Pap smear more often if you have any of the following:  · Positive test result for the human papillomavirus (HPV)    · Cervical intraepithelial neoplasm or cervical cancer    · HIV    · A weak immune system    · Exposure to diethylstilbestrol (SIMÓN) medicine when your mother was pregnant with you  CARE AGREEMENT:   You have the right to help plan your care  Learn about your health condition and how it may be treated  Discuss treatment options with your caregivers to decide what care you want to receive  You always have the right to refuse treatment  The above information is an  only   It is not intended as medical advice for individual conditions or treatments  Talk to your doctor, nurse or pharmacist before following any medical regimen to see if it is safe and effective for you  © 2014 [address] is for End User's use only and may not be sold, redistributed or otherwise used for commercial purposes  All illustrations and images included in CareNotes® are the copyrighted property of A D A M , [employer]  or [employer]

## 2019-10-29 LAB
HPV HR 12 DNA CVX QL NAA+PROBE: NEGATIVE
HPV16 DNA CVX QL NAA+PROBE: NEGATIVE
HPV18 DNA CVX QL NAA+PROBE: NEGATIVE

## 2019-10-30 ENCOUNTER — OFFICE VISIT (OUTPATIENT)
Dept: BARIATRICS | Facility: CLINIC | Age: 34
End: 2019-10-30
Payer: COMMERCIAL

## 2019-10-30 VITALS
WEIGHT: 293 LBS | DIASTOLIC BLOOD PRESSURE: 84 MMHG | HEIGHT: 70 IN | HEART RATE: 79 BPM | TEMPERATURE: 99 F | RESPIRATION RATE: 18 BRPM | BODY MASS INDEX: 41.95 KG/M2 | SYSTOLIC BLOOD PRESSURE: 120 MMHG

## 2019-10-30 DIAGNOSIS — Z01.818 ENCOUNTER FOR OTHER PREPROCEDURAL EXAMINATION: Primary | ICD-10-CM

## 2019-10-30 DIAGNOSIS — M19.90 DJD (DEGENERATIVE JOINT DISEASE): ICD-10-CM

## 2019-10-30 DIAGNOSIS — G47.33 OBSTRUCTIVE SLEEP APNEA (ADULT) (PEDIATRIC): ICD-10-CM

## 2019-10-30 DIAGNOSIS — E66.01 CLASS 3 SEVERE OBESITY DUE TO EXCESS CALORIES WITHOUT SERIOUS COMORBIDITY WITH BODY MASS INDEX (BMI) OF 50.0 TO 59.9 IN ADULT (HCC): ICD-10-CM

## 2019-10-30 DIAGNOSIS — E66.01 MORBID (SEVERE) OBESITY DUE TO EXCESS CALORIES (HCC): ICD-10-CM

## 2019-10-30 PROCEDURE — 99212 OFFICE O/P EST SF 10 MIN: CPT | Performed by: SURGERY

## 2019-10-30 RX ORDER — PHENTERMINE HYDROCHLORIDE 37.5 MG/1
37.5 CAPSULE ORAL EVERY MORNING
Qty: 30 CAPSULE | Refills: 0 | Status: SHIPPED | OUTPATIENT
Start: 2019-10-30 | End: 2019-11-15 | Stop reason: SDUPTHER

## 2019-10-30 RX ORDER — ACYCLOVIR 400 MG/1
TABLET ORAL
Refills: 1 | COMMUNITY
Start: 2019-10-25 | End: 2019-11-06 | Stop reason: ALTCHOICE

## 2019-10-30 RX ORDER — TOPIRAMATE 50 MG/1
100 TABLET, FILM COATED ORAL
Qty: 60 TABLET | Refills: 0 | Status: SHIPPED | OUTPATIENT
Start: 2019-10-30 | End: 2019-11-15 | Stop reason: SDUPTHER

## 2019-10-30 NOTE — PROGRESS NOTES
Progress Note - Bariatric Surgery   Gale Davis 35 y o  female MRN: [id number]  Unit/Bed#:  Encounter: [id number]    Assessment:  33/F with morbid obesity presenting for follow up/weight check    Plan:  Increase phentermine/topirate   Mindful eating, stress reduction, sleep hygiene (just started CPAP 10/29/19)  Increase activity (~30 min walk at home after work)   F/U w/ RD next month     Subjective/Objective     Subjective: No weight loss over the past month, but patient reports making positive changes  She has stopped eating fast food, is eating less red meal, has stopped drinking soda, and is decreasing her portion sizes  She states she still has rice, bread, ice cream and bagels and is working on eliminating those  She also reports being sedentary  She states the medications decreased her appetite for the first week, but her appetite returned to normal      Review of systems negative except feeling intermittently hot/cold and arthralgia in her knees  Objective:     Blood pressure 120/84, pulse 79, temperature 99 °F (37 2 °C), temperature source Tympanic, resp  rate 18, height 5' 10" (1 778 m), weight (!) 161 kg (355 lb 12 8 oz), not currently breastfeeding  ,Body mass index is 51 05 kg/m²

## 2019-10-31 LAB
LAB AP GYN PRIMARY INTERPRETATION: NORMAL
Lab: NORMAL

## 2019-11-06 ENCOUNTER — APPOINTMENT (OUTPATIENT)
Dept: LAB | Facility: CLINIC | Age: 34
End: 2019-11-06
Payer: COMMERCIAL

## 2019-11-06 ENCOUNTER — OFFICE VISIT (OUTPATIENT)
Dept: FAMILY MEDICINE CLINIC | Facility: CLINIC | Age: 34
End: 2019-11-06
Payer: COMMERCIAL

## 2019-11-06 VITALS
HEIGHT: 70 IN | HEART RATE: 110 BPM | SYSTOLIC BLOOD PRESSURE: 122 MMHG | BODY MASS INDEX: 41.95 KG/M2 | WEIGHT: 293 LBS | OXYGEN SATURATION: 95 % | DIASTOLIC BLOOD PRESSURE: 80 MMHG | TEMPERATURE: 97.9 F

## 2019-11-06 DIAGNOSIS — Z02.0 SCHOOL PHYSICAL EXAM: ICD-10-CM

## 2019-11-06 DIAGNOSIS — G47.33 OBSTRUCTIVE SLEEP APNEA (ADULT) (PEDIATRIC): ICD-10-CM

## 2019-11-06 DIAGNOSIS — F33.42 RECURRENT MAJOR DEPRESSIVE DISORDER, IN FULL REMISSION (HCC): Primary | ICD-10-CM

## 2019-11-06 PROBLEM — E66.01 MORBID (SEVERE) OBESITY DUE TO EXCESS CALORIES (HCC): Status: RESOLVED | Noted: 2019-08-15 | Resolved: 2019-11-06

## 2019-11-06 PROBLEM — Z01.810 PREOP CARDIOVASCULAR EXAM: Status: RESOLVED | Noted: 2019-09-12 | Resolved: 2019-11-06

## 2019-11-06 PROBLEM — Z01.419 ENCOUNTER FOR GYNECOLOGICAL EXAMINATION (GENERAL) (ROUTINE) WITHOUT ABNORMAL FINDINGS: Status: RESOLVED | Noted: 2019-10-25 | Resolved: 2019-11-06

## 2019-11-06 PROBLEM — R87.610 ASCUS WITH POSITIVE HIGH RISK HPV CERVICAL: Status: RESOLVED | Noted: 2018-11-06 | Resolved: 2019-11-06

## 2019-11-06 PROBLEM — R73.03 PREDIABETES: Status: ACTIVE | Noted: 2018-10-08

## 2019-11-06 PROBLEM — R87.810 ASCUS WITH POSITIVE HIGH RISK HPV CERVICAL: Status: RESOLVED | Noted: 2018-11-06 | Resolved: 2019-11-06

## 2019-11-06 PROBLEM — R73.03 PREDIABETES: Status: RESOLVED | Noted: 2018-10-08 | Resolved: 2019-11-06

## 2019-11-06 LAB — HBV SURFACE AB SER-ACNC: <3.1 MIU/ML

## 2019-11-06 PROCEDURE — 3008F BODY MASS INDEX DOCD: CPT | Performed by: FAMILY MEDICINE

## 2019-11-06 PROCEDURE — 99204 OFFICE O/P NEW MOD 45 MIN: CPT | Performed by: FAMILY MEDICINE

## 2019-11-06 PROCEDURE — 86480 TB TEST CELL IMMUN MEASURE: CPT

## 2019-11-06 PROCEDURE — 36415 COLL VENOUS BLD VENIPUNCTURE: CPT

## 2019-11-06 PROCEDURE — 86706 HEP B SURFACE ANTIBODY: CPT

## 2019-11-06 NOTE — PROGRESS NOTES
Sade Moore 1985 female MRN: [id number]      ASSESSMENT/PLAN  Problem List Items Addressed This Visit        Respiratory    Obstructive sleep apnea (adult) (pediatric)       Other    BMI 50 0-59 9, adult ([facility] [address] )    Recurrent major depressive disorder, in full remission ([facility] ) - Primary      Other Visit Diagnoses     School physical exam        Relevant Orders    Hepatitis B surface antibody    Quantiferon TB Gold Plus        CELE: Poor pt tolerance to mask; however, continues to try -- will hopefully resolve with gastric bypass     Depression: Stable on current regimen     BMI 50: Working with [employer] management towards bariatric surgery     School form completed -- will sign off once TB results available  Hep B Ab confirmation ordered  Future Appointments   Date Time Provider Fco Bajwa   11/7/2019  2:15 PM DO MAGALY Ochoa STR [facility]   11/10/2019  1:45 PM MO US 1 MO US MO HOSP   12/6/2019  1:30 PM FRANK Aguiar MGT MON [facility]   10/30/2020  4:40 PM LUCRECIA Quintero MON [facility]          SUBJECTIVE  CC: Establish Care and Physical Exam      HPI:  Sade Moore is a 35 y o  female who presents to establish care  History reviewed and updated as below  CELE: Has CPAP, though has difficulty tolerating it (especially currently due to nasal congestion)   Obesity: Working with weight management, towards gastric bypass procedure; Currently on phentermine and Topamax   Depression/Anxiety: Doing well on Lexapro, Wellbutrin     Review of Systems   Constitutional: Negative for unexpected weight change  HENT: Positive for congestion  Negative for ear pain, rhinorrhea and sore throat  Eyes: Negative for visual disturbance  Respiratory: Negative for cough and shortness of breath  Cardiovascular: Negative for chest pain, palpitations and leg swelling  Gastrointestinal: Positive for constipation (intermittent)  Negative for abdominal pain and diarrhea  Endocrine: Negative for polydipsia and polyuria  Genitourinary: Negative for difficulty urinating and menstrual problem  Neurological: Negative for dizziness, light-headedness and headaches  Psychiatric/Behavioral: Negative for sleep disturbance         Historical Information   The patient history was reviewed and updated as follows:    Past Medical History:   Diagnosis Date    Abnormal Pap smear of cervix     Anxiety     ASCUS with positive high risk HPV cervical 11/6/2018    Back pain     Depressed     Femur fracture (HCC)     GERD (gastroesophageal reflux disease)     HPV (human papilloma virus) infection     Hypertension     Irritable bowel syndrome     Morbid obesity with BMI of 50 0-59 9, adult ([facility] [address] )     Obesity     Pre-diabetes     Prediabetes 10/8/2018    Overview:  Per Prediabetes protocol #1    Sleep apnea      Past Surgical History:   Procedure Laterality Date    COLPOSCOPY  9/2018    FEMUR FRACTURE SURGERY      WISDOM TOOTH EXTRACTION       Family History   Problem Relation Age of Onset    Rheum arthritis Mother     Fibroids Mother     Obesity Mother     Depression Mother     Hypertension Mother     Substance Abuse Mother     Skin cancer Mother     Diabetes Father     Hyperlipidemia Father     Hypertension Father     Thyroid disease Father     Substance Abuse Father     Prostate cancer Father     Gout Father     Depression Sister     Depression Maternal Grandmother     Diabetes Maternal Grandfather     Depression Maternal Grandfather       Social History   Social History     Substance and Sexual Activity   Alcohol Use Yes    Alcohol/week: 1 0 standard drinks    Types: 1 Glasses of wine per week    Frequency: 2-4 times a month     Social History     Substance and Sexual Activity   Drug Use No     Social History     Tobacco Use   Smoking Status Former Smoker    Packs/day: 0 00    Years: 0 00    Pack years: 0 00    Types: Cigarettes    Last attempt to quit: 2017    Years since quittin 8   Smokeless Tobacco Never Used   Tobacco Comment    0 75 ppd for 2-3 years; History of vaping        Medications:     Current Outpatient Medications:     baclofen 20 mg tablet, as needed , Disp: , Rfl:     buPROPion (WELLBUTRIN XL) 150 mg 24 hr tablet, Take 150 mg by mouth, Disp: , Rfl:     Cholecalciferol (VITAMIN D3) 10467 units TABS, Take by mouth daily, Disp: , Rfl:     diclofenac (VOLTAREN) 75 mg EC tablet, as needed , Disp: , Rfl:     docusate sodium (COLACE) 100 mg capsule, Take 100 mg by mouth 2 (two) times a day, Disp: , Rfl:     escitalopram (LEXAPRO) 10 mg tablet, , Disp: , Rfl:     famotidine (PEPCID) 10 mg tablet, Take 10 mg by mouth 2 (two) times a day, Disp: , Rfl:     hydrocortisone (ANUSOL-HC, PROCTOSOL HC,) 2 5 % rectal cream, Insert into the rectum 2 (two) times a day, Disp: 30 g, Rfl: 0    MIRENA, 52 MG, 20 MCG/24HR IUD, TO BE INSERTED ONE TIME BY PRESCRIBER  ROUTE INTRAUTERINE , Disp: , Rfl: 0    Multiple Vitamin (MULTI VITAMIN PO), Take by mouth daily, Disp: , Rfl:     phentermine 37 5 MG capsule, Take 1 capsule (37 5 mg total) by mouth every morning, Disp: 30 capsule, Rfl: 0    topiramate (TOPAMAX) 50 MG tablet, Take 2 tablets (100 mg total) by mouth daily at bedtime, Disp: 60 tablet, Rfl: 0  No Known Allergies    OBJECTIVE    Vitals:   Vitals:    19 0849   BP: 122/80   Pulse: (!) 110   Temp: 97 9 °F (36 6 °C)   TempSrc: Tympanic   SpO2: 95%   Weight: (!) 161 kg (354 lb 3 2 oz)   Height: 5' 10" (1 778 m)           Physical Exam   Constitutional: She appears well-developed and well-nourished  No distress  HENT:   Head: Normocephalic and atraumatic  Right Ear: External ear normal    Left Ear: External ear normal    Nose: Nose normal    Mouth/Throat: Oropharynx is clear and moist    Eyes: Conjunctivae are normal    Neck: No thyromegaly present  Cardiovascular: Normal rate and regular rhythm     Pulmonary/Chest: Effort normal and breath sounds normal  No respiratory distress  Abdominal: Soft  Bowel sounds are normal  She exhibits no distension  There is no tenderness  Musculoskeletal: She exhibits no edema  Lymphadenopathy:     She has no cervical adenopathy  Neurological: She is alert  Skin: Skin is warm and dry  Psychiatric: She has a normal mood and affect  Vitals reviewed  DO Fantasma [facility]ley [facility]   11/6/2019  9:41 AM      BMI Counseling: Body mass index is 50 82 kg/m²  The BMI is above normal  Patient referred to weight management due to patient being morbidly obese  Pt is already established with weight management

## 2019-11-07 ENCOUNTER — APPOINTMENT (OUTPATIENT)
Dept: RADIOLOGY | Facility: CLINIC | Age: 34
End: 2019-11-07
Payer: COMMERCIAL

## 2019-11-07 ENCOUNTER — OFFICE VISIT (OUTPATIENT)
Dept: OBGYN CLINIC | Facility: CLINIC | Age: 34
End: 2019-11-07
Payer: COMMERCIAL

## 2019-11-07 VITALS — WEIGHT: 293 LBS | HEIGHT: 70 IN | BODY MASS INDEX: 41.95 KG/M2

## 2019-11-07 DIAGNOSIS — M21.062 ACQUIRED GENU VALGUM OF LEFT KNEE: ICD-10-CM

## 2019-11-07 DIAGNOSIS — M21.061 ACQUIRED GENU VALGUM OF RIGHT KNEE: ICD-10-CM

## 2019-11-07 DIAGNOSIS — M22.2X1 PATELLOFEMORAL DISORDER OF BOTH KNEES: Primary | ICD-10-CM

## 2019-11-07 DIAGNOSIS — M22.2X2 PATELLOFEMORAL DISORDER OF BOTH KNEES: Primary | ICD-10-CM

## 2019-11-07 DIAGNOSIS — M25.561 RIGHT KNEE PAIN, UNSPECIFIED CHRONICITY: ICD-10-CM

## 2019-11-07 DIAGNOSIS — M76.31 IT BAND SYNDROME, RIGHT: ICD-10-CM

## 2019-11-07 LAB
GAMMA INTERFERON BACKGROUND BLD IA-ACNC: 0.15 IU/ML
M TB IFN-G BLD-IMP: NEGATIVE
M TB IFN-G CD4+ BCKGRND COR BLD-ACNC: -0.02 IU/ML
M TB IFN-G CD4+ BCKGRND COR BLD-ACNC: -0.02 IU/ML
MITOGEN IGNF BCKGRD COR BLD-ACNC: >10 IU/ML

## 2019-11-07 PROCEDURE — 73552 X-RAY EXAM OF FEMUR 2/>: CPT

## 2019-11-07 PROCEDURE — 99203 OFFICE O/P NEW LOW 30 MIN: CPT | Performed by: ORTHOPAEDIC SURGERY

## 2019-11-07 NOTE — PROGRESS NOTES
Assessment/Plan:  1  Patellofemoral disorder of both knees  Ambulatory referral to Physical Therapy   2  Right knee pain, unspecified chronicity  CANCELED: XR knee 3 vw right non injury   3  Acquired genu valgum of right knee     4  Acquired genu valgum of left knee     5  It band syndrome, right  Ambulatory referral to Physical Therapy     Patient Active Problem List   Diagnosis    DJD (degenerative joint disease)    Obstructive sleep apnea (adult) (pediatric)    IUD check up    BMI 50 0-59 9, adult ([facility] [address] )    Recurrent major depressive disorder, in full remission ([facility] [address] )    Acquired genu valgum of right knee    Acquired genu valgum of left knee    It band syndrome, right    Patellofemoral disorder of both knees       Discussion/Summary:    35 y o  female  Bilateral knee patellofemoral  Disorder  right thigh IT band syndrome  Handout given for Moncada taping technique  She will be sent to physical therapy as well work on these issues and to with applying tape  Follow up in 6 weeks if she is not 50 percent improved are more will consider Visco injections for her  The patient was seen and examined by Dr Nam Buenrostro and myself  The assessment and plan were formulated by Dr Nam Buenrostro and I assisted in carrying it out  Subjective:   Patient ID: Genevieve Araujo is a 35 y o  female   HPI    Patient presents to the office complaining of right knee and thigh pain  Symptoms began over 13 years ago had a crush injury by a horse was treated at [facility] by Dr Eder Maxwell who did a Long TFN  Pain is located anterior knee and throughout the thigh   Pain is described as stabbing, intermittent  The pain does radiate up the thigh to the anterior thigh midway  The pain is 8/10 at worst, 1-2/10 at best  It is made worse by weather changes, walking  It is made better by rest  Stiffness in the morning, goes away within 10-15 mins   So far the patient has tried voltaren gel does help, aleve, no injections, has tried bracing without relief  The patient does have residual numbness in left leg, has chronic low back pain, does get radicular pain in right leg  The following portions of the patient's history were reviewed and updated as appropriate: allergies, current medications, past family history, past social history, past surgical history and problem list     Social History     Socioeconomic History    Marital status: Single     Spouse name: Not on file    Number of children: Not on file    Years of education: Not on file    Highest education level: Not on file   Occupational History    Not on file   Social Needs    Financial resource strain: Not on file    Food insecurity:     Worry: Not on file     Inability: Not on file    Transportation needs:     Medical: Not on file     Non-medical: Not on file   Tobacco Use    Smoking status: Former Smoker     Packs/day: 0 00     Years: 0 00     Pack years: 0 00     Types: Cigarettes     Last attempt to quit: 2017     Years since quittin 8    Smokeless tobacco: Never Used    Tobacco comment: 0 75 ppd for 2-3 years; History of vaping    Substance and Sexual Activity    Alcohol use:  Yes     Alcohol/week: 1 0 standard drinks     Types: 1 Glasses of wine per week     Frequency: 2-4 times a month    Drug use: No    Sexual activity: Yes     Partners: Male     Birth control/protection: IUD     Comment: mirena inserted 18   Lifestyle    Physical activity:     Days per week: Not on file     Minutes per session: Not on file    Stress: Not on file   Relationships    Social connections:     Talks on phone: Not on file     Gets together: Not on file     Attends [employer]ism service: Not on file     Active member of club or organization: Not on file     Attends meetings of clubs or organizations: Not on file     Relationship status: Not on file    Intimate partner violence:     Fear of current or ex partner: Not on file     Emotionally abused: Not on file     Physically abused: Not on file     Forced sexual activity: Not on file   Other Topics Concern    Not on file   Social History Narrative    Lives with boyfriend and his kids (part time)     Works at [employer]      Past Medical History:   Diagnosis Date    Abnormal Pap smear of cervix     Anxiety     ASCUS with positive high risk HPV cervical 11/6/2018    Back pain     Depressed     Femur fracture ([facility] [address] )     GERD (gastroesophageal reflux disease)     HPV (human papilloma virus) infection     Hypertension     Irritable bowel syndrome     Morbid obesity with BMI of 50 0-59 9, adult ([facility] )     Obesity     Pre-diabetes     Prediabetes 10/8/2018    Overview:  Per Prediabetes protocol #1    Sleep apnea      Past Surgical History:   Procedure Laterality Date    COLPOSCOPY  9/2018    FEMUR FRACTURE SURGERY      WISDOM TOOTH EXTRACTION       No Known Allergies  Current Outpatient Medications on File Prior to Visit   Medication Sig Dispense Refill    baclofen 20 mg tablet as needed       buPROPion (WELLBUTRIN XL) 150 mg 24 hr tablet Take 150 mg by mouth      Cholecalciferol (VITAMIN D3) 23000 units TABS Take by mouth daily      diclofenac (VOLTAREN) 75 mg EC tablet as needed       docusate sodium (COLACE) 100 mg capsule Take 100 mg by mouth 2 (two) times a day      escitalopram (LEXAPRO) 10 mg tablet       famotidine (PEPCID) 10 mg tablet Take 10 mg by mouth 2 (two) times a day      hydrocortisone (ANUSOL-HC, PROCTOSOL HC,) 2 5 % rectal cream Insert into the rectum 2 (two) times a day 30 g 0    MIRENA, 52 MG, 20 MCG/24HR IUD TO BE INSERTED ONE TIME BY PRESCRIBER  ROUTE INTRAUTERINE   0    Multiple Vitamin (MULTI VITAMIN PO) Take by mouth daily      phentermine 37 5 MG capsule Take 1 capsule (37 5 mg total) by mouth every morning 30 capsule 0    topiramate (TOPAMAX) 50 MG tablet Take 2 tablets (100 mg total) by mouth daily at bedtime 60 tablet 0     No current facility-administered medications on file prior to visit  Review of Systems   Constitutional: Negative for chills, fever and unexpected weight change  HENT: Negative for hearing loss, nosebleeds and sore throat  Eyes: Negative for pain, redness and visual disturbance  Respiratory: Negative for cough, shortness of breath and wheezing  Cardiovascular: Negative for chest pain, palpitations and leg swelling  Gastrointestinal: Negative for abdominal pain, nausea and vomiting  Endocrine: Negative for polydipsia and polyuria  Genitourinary: Negative for dysuria and hematuria  Musculoskeletal:         As noted in HPI   Skin: Negative for rash and wound  Neurological: Negative for dizziness, numbness and headaches  Psychiatric/Behavioral: Negative for decreased concentration, dysphoric mood and suicidal ideas  The patient is not nervous/anxious  Objective: There were no vitals filed for this visit  Physical Exam   Constitutional: She is oriented to person, place, and time  She appears well-developed and well-nourished  HENT:   Head: Normocephalic and atraumatic  Eyes: Conjunctivae are normal  No scleral icterus  Neck: Neck supple  Cardiovascular:   No discernible arrhthymias   Pulmonary/Chest: Effort normal  No stridor  No respiratory distress  Abdominal: Soft  She exhibits no distension  Musculoskeletal:        Right knee: She exhibits no effusion  Neurological: She is alert and oriented to person, place, and time  Skin: Skin is warm and dry  No erythema  Psychiatric: She has a normal mood and affect  Her behavior is normal        Right Knee Exam     Muscle Strength   The patient has normal right knee strength  Tenderness   The patient is experiencing tenderness in the medial retinaculum (TTP over IT band from mid thigh to the level of the knee and over gerdys tubercle)  Range of Motion   The patient has normal right knee ROM    Extension: normal   Flexion: normal     Tests   Varus: negative Valgus: negative  Patellar apprehension: positive    Other   Erythema: absent  Scars: absent  Sensation: normal  Pulse: present  Swelling: none  Effusion: no effusion present    Comments:  No ecchymosis    Valgus deformity   positive patellar grind test      Left Knee Exam     Comments:   Valgus deformity            I have personally reviewed pertinent films in PACS and my interpretation is  x-rays right femur demonstrate  stable intact long intramedullary nail in the femur, proximal locking screws are intact there is heterotopic ossification seen medial to the proximal locking screw, limited views of the knee show no significant degenerative changes no degenerative changes in the hip  Procedures  No Procedures performed today    Portions of the record may have been created with voice recognition software  Occasional wrong word or "sound a like" substitutions may have occurred due to the inherent limitations of voice recognition software  Read the chart carefully and recognize, using context, where substitutions have occurred

## 2019-11-07 NOTE — PATIENT INSTRUCTIONS
PATELLOFEMORAL SYNDROME-Shelby TAPING TECHNIQUE    Search Leukotape P tape and Cover roll stretch tape on  [employer]  Leukotape P is typically 1 5in x 15 yards, Cover roll stretch tape is typically 2in x 10 yards        How to apply:  1  Place cover roll tape over knee cap  This protects the skin  2  Apply Leukotape over the cover roll tape  Use the Leukotape to pull the knee cap to the middle of the body (medial side of knee) to prevent lateral (outside) tracking of the knee cap  3  Wear the tape for 3 days (72 hrs) straight, then take off one day (24 hrs) off, then repeat  4  Visit youtube  com and search Shelby tape for the knee to watch a video on how to apply tape  a  Video titled Shelby Taping of the knee created by [employer] recommended  What does Shelby taping technique do?  ? Patellofemoral syndrome is when the inside quadriceps muscle, called the VMO muscle, becomes weak due to a number of factors  This causes the Patellofemoral ligament, which is the only ligament holding the patella (knee cap) in place, to become weak as well  When the ligament becomes weak, the knee cap drifts or tracks too far to the lateral side of the knee (outside knee) which causes tension on this ligament  The knee cap hits the lateral area of the femur, resulting in pain or discomfort around the front of the knee  ? Physical Therapy is sometimes used to strengthen the weak muscles, such as the VMO, to correct this problem  When the tape is applied correctly, it helps to realign the knee cap to the center of the knee  This helps correct for the lateral tracking of the knee cap and relieve discomfort  ? You can search online for exercises that can help strengthen the VMO quadriceps muscle or attend physical therapy

## 2019-11-10 ENCOUNTER — HOSPITAL ENCOUNTER (OUTPATIENT)
Dept: ULTRASOUND IMAGING | Facility: HOSPITAL | Age: 34
Discharge: HOME | End: 2019-11-10
Payer: COMMERCIAL

## 2019-11-10 DIAGNOSIS — Z30.431 IUD CHECK UP: ICD-10-CM

## 2019-11-10 PROCEDURE — 76856 US EXAM PELVIC COMPLETE: CPT

## 2019-11-10 PROCEDURE — 76830 TRANSVAGINAL US NON-OB: CPT

## 2019-11-12 ENCOUNTER — CLINICAL SUPPORT (OUTPATIENT)
Dept: FAMILY MEDICINE CLINIC | Facility: CLINIC | Age: 34
End: 2019-11-12
Payer: COMMERCIAL

## 2019-11-12 DIAGNOSIS — Z23 NEED FOR HEPATITIS B VACCINATION: Primary | ICD-10-CM

## 2019-11-12 PROCEDURE — 90746 HEPB VACCINE 3 DOSE ADULT IM: CPT | Performed by: FAMILY MEDICINE

## 2019-11-12 PROCEDURE — 90471 IMMUNIZATION ADMIN: CPT | Performed by: FAMILY MEDICINE

## 2019-11-13 ENCOUNTER — TELEPHONE (OUTPATIENT)
Dept: OBGYN CLINIC | Facility: CLINIC | Age: 34
End: 2019-11-13

## 2019-11-13 NOTE — TELEPHONE ENCOUNTER
----- Message from Natalie Dhillon sent at 11/13/2019  7:36 AM EST -----  Please let pt know that her IUD is in the proper location, strings may be tucked int he cervix  So I would leave it alone, monitor periods (spotting ok), if heavy bleeding or return of regular periods flow or if pelvic pain begins, she can then see a physician to remove  But it is working and is in the correct place for now    Thanks

## 2019-11-15 DIAGNOSIS — E66.01 MORBID (SEVERE) OBESITY DUE TO EXCESS CALORIES (HCC): ICD-10-CM

## 2019-11-15 RX ORDER — TOPIRAMATE 50 MG/1
100 TABLET, FILM COATED ORAL
Qty: 60 TABLET | Refills: 0 | Status: SHIPPED | OUTPATIENT
Start: 2019-11-15 | End: 2019-12-18 | Stop reason: ALTCHOICE

## 2019-11-15 RX ORDER — PHENTERMINE HYDROCHLORIDE 15 MG/1
CAPSULE ORAL
Qty: 30 CAPSULE | Refills: 0 | OUTPATIENT
Start: 2019-11-15

## 2019-11-15 RX ORDER — PHENTERMINE HYDROCHLORIDE 37.5 MG/1
37.5 CAPSULE ORAL EVERY MORNING
Qty: 30 CAPSULE | Refills: 0 | Status: SHIPPED | OUTPATIENT
Start: 2019-11-15 | End: 2019-12-31 | Stop reason: HOSPADM

## 2019-11-15 RX ORDER — TOPIRAMATE 50 MG/1
TABLET, FILM COATED ORAL
Qty: 30 TABLET | Refills: 0 | OUTPATIENT
Start: 2019-11-15

## 2019-11-19 ENCOUNTER — EVALUATION (OUTPATIENT)
Dept: PHYSICAL THERAPY | Facility: CLINIC | Age: 34
End: 2019-11-19
Payer: COMMERCIAL

## 2019-11-19 ENCOUNTER — TELEPHONE (OUTPATIENT)
Dept: FAMILY MEDICINE CLINIC | Facility: CLINIC | Age: 34
End: 2019-11-19

## 2019-11-19 DIAGNOSIS — M25.562 CHRONIC PAIN OF LEFT KNEE: ICD-10-CM

## 2019-11-19 DIAGNOSIS — M25.561 CHRONIC PAIN OF RIGHT KNEE: Primary | ICD-10-CM

## 2019-11-19 DIAGNOSIS — G89.29 CHRONIC PAIN OF RIGHT KNEE: Primary | ICD-10-CM

## 2019-11-19 DIAGNOSIS — G89.29 CHRONIC PAIN OF LEFT KNEE: ICD-10-CM

## 2019-11-19 PROCEDURE — 97110 THERAPEUTIC EXERCISES: CPT | Performed by: PHYSICAL THERAPIST

## 2019-11-19 PROCEDURE — 97161 PT EVAL LOW COMPLEX 20 MIN: CPT | Performed by: PHYSICAL THERAPIST

## 2019-11-19 NOTE — TELEPHONE ENCOUNTER
Patient called to ask if she is having surgery in January, will a surgery effect her being able to receive the 3rd Hep B that she will be due for

## 2019-11-19 NOTE — PROGRESS NOTES
PT Evaluation     Today's date: 2019  Patient name: Ángela Balderrama  : 1985  MRN: [id number]  Referring provider: Barber Horowitz  Dx:   Encounter Diagnosis     ICD-10-CM    1  Chronic pain of right knee M25 561     G89 29    2  Chronic pain of left knee M25 562     G89 29                   Assessment  Assessment details: Patient presents with bilateral knee pain right worse than left  Chronic pain due to weight and knee hyperextension  Patient walks in narrow QUYNH and has pronated feet  Glute weakness  No OA noted  Core weakness  Discussed new shoe wear  Reviewed home exercise program and reviewed taping with rock tape  Patient can benefit from skilled PT for gait training, tape to knees and core strengthening exercises  Understanding of Dx/Px/POC: good   Prognosis: good    Goals  2 weeks  Walk 10 minutes consistently without pain  Compliant with home exercise program  4 weeks   Walk 20 minutes uphill without pain  Go up 12 steps with minimal pain  Independent with exercise program    Plan  Planned modality interventions: ultrasound  Planned therapy interventions: manual therapy, strengthening, stretching, home exercise program, Moncada taping and balance  Frequency: 2x week  Duration in weeks: 4        Subjective Evaluation    History of Present Illness  Mechanism of injury: History bilateral knee pain, femur fx 2006    X-ray right knee none significant  Pain  Current pain ratin  At best pain ratin  At worst pain ratin  Quality: sharp  Relieving factors: medications  Aggravating factors: walking  Progression: worsening      Diagnostic Tests  X-ray: normal  Patient Goals  Patient goals for therapy: decreased pain and increased strength          Objective     Active Range of Motion   Left Knee   Flexion: WFL  Extension: WFL    Right Knee   Flexion: WFL  Extension: [facility]    Strength/Myotome Testing     Left Knee   Normal strength    Right Knee   Normal strength    Ambulation     Observational Gait   Base of support: decreased             Precautions: none      Manual                                                                                   Exercise Diary  11/19            HEP pt ed 25'            taping 5'            bike             TM gt   tr             Step ups             Circuit legs             Vector 5                                                                                                                                                                                          Modalities

## 2019-11-19 NOTE — LETTER
2019    Kyra Jacobo, [address] [facility]  [address] [place] [address]    Patient: Rizwana Swenson   YOB: 1985   Date of Visit: 2019     Encounter Diagnosis     ICD-10-CM    1  Chronic pain of right knee M25 561     G89 29    2  Chronic pain of left knee M25 562     G89 29        Dear Dr Corinne Rhein: Thank you for your recent referral of Rizwana Swenson  Please review the attached evaluation summary from Naa's recent visit  Please verify that you agree with the plan of care by signing the attached order  If you have any questions or concerns, please do not hesitate to call  I sincerely appreciate the opportunity to share in the care of one of your patients and hope to have another opportunity to work with you in the near future  Sincerely,    Pamela Moon, PT      Referring Provider:      I certify that I have read the below Plan of Care and certify the need for these services furnished under this plan of treatment while under my care  Kyra Jacobo PA-C  [address] [address]  [place]          PT Evaluation     Today's date: 2019  Patient name: Rizwana Swenson  : 1985  MRN: [id number]  Referring provider: Gill Beltrán  Dx:   Encounter Diagnosis     ICD-10-CM    1  Chronic pain of right knee M25 561     G89 29    2  Chronic pain of left knee M25 562     G89 29                   Assessment  Assessment details: Patient presents with bilateral knee pain right worse than left  Chronic pain due to weight and knee hyperextension  Patient walks in narrow QUYNH and has pronated feet  Glute weakness  No OA noted  Core weakness  Discussed new shoe wear  Reviewed home exercise program and reviewed taping with rock tape  Patient can benefit from skilled PT for gait training, tape to knees and core strengthening exercises    Understanding of Dx/Px/POC: good Prognosis: good    Goals  2 weeks  Walk 10 minutes consistently without pain  Compliant with home exercise program  4 weeks   Walk 20 minutes uphill without pain  Go up 12 steps with minimal pain  Independent with exercise program    Plan  Planned modality interventions: ultrasound  Planned therapy interventions: manual therapy, strengthening, stretching, home exercise program, Moncada taping and balance  Frequency: 2x week  Duration in weeks: 4        Subjective Evaluation    History of Present Illness  Mechanism of injury: History bilateral knee pain, femur fx 2006  X-ray right knee none significant  Pain  Current pain ratin  At best pain ratin  At worst pain ratin  Quality: sharp  Relieving factors: medications  Aggravating factors: walking  Progression: worsening      Diagnostic Tests  X-ray: normal  Patient Goals  Patient goals for therapy: decreased pain and increased strength          Objective     Active Range of Motion   Left Knee   Flexion: WFL  Extension: WFL    Right Knee   Flexion: WFL  Extension: [facility]    Strength/Myotome Testing     Left Knee   Normal strength    Right Knee   Normal strength    Ambulation     Observational Gait   Base of support: decreased             Precautions: none      Manual                                                                                   Exercise Diary              HEP pt ed 25'            taping 5'            bike             TM gt   tr             Step ups             Circuit legs             Vector 5                                                                                                                                                                                          Modalities

## 2019-11-21 ENCOUNTER — APPOINTMENT (OUTPATIENT)
Dept: PHYSICAL THERAPY | Facility: CLINIC | Age: 34
End: 2019-11-21
Payer: COMMERCIAL

## 2019-11-22 ENCOUNTER — OFFICE VISIT (OUTPATIENT)
Dept: BARIATRICS | Facility: CLINIC | Age: 34
End: 2019-11-22

## 2019-11-22 VITALS — WEIGHT: 293 LBS | HEIGHT: 70 IN | BODY MASS INDEX: 41.95 KG/M2

## 2019-11-22 DIAGNOSIS — E66.01 MORBID (SEVERE) OBESITY DUE TO EXCESS CALORIES (HCC): Primary | ICD-10-CM

## 2019-11-22 PROCEDURE — RECHECK

## 2019-11-22 NOTE — PROGRESS NOTES
Bariatric Follow Up Nutrition Note    Pre-op  Pre-op program    Pt requires no weight checks per insurance requirements  This weight check is while pt is completing pre-op clearance process  Type of surgery  Surgery Date: TBD  Surgeon: Dr Whitley Crowell  35 y o   female    There were no vitals filed for this visit  Weight (last 2 days)     Date/Time   Weight    11/22/19 0821   (!) 153 (338)            Body mass index is 48 5 kg/m²  Height: 70 0 inches    Next weight check will be with me (FRANK) in about 2 weeks  Reviewed pt's pre-operative workflow in Exemplo this visit:  Support Group: attended  PCP letter: received  Blood work: completed and cleared  Cardiology: completed  Sleep medicine: dx CELE and uses her CPAP - pt states she keeps track of it in her chacorta and states she averages 4-5 hours per night with it  Weight checks: none required  EGD: completed  Weight loss: 10%; 5% pre-op scheduling weight not achieved at this time    Pre-op Weight History:  Weight on Day of Initial Dietary Evaluation: 355 0 lbs  (BMI 50 9) 7/22/2019  Current Weight: 338 0 lbs  Pt has lost 17 lbs  since initial dietary evaluation  Pre-op goal weight: 319 5 lbs  (10% loss from pt's initial evaluation weight)  Weight to be scheduled for surgery after completing clearances: 337 3 lbs  (1/2 lost of required pre-op goal weight)    Weight in to have BMI = 25: 174 6 lbs      Review of History and Medications   Past Medical History:   Diagnosis Date    Abnormal Pap smear of cervix     Anxiety     ASCUS with positive high risk HPV cervical 11/6/2018    Back pain     Depressed     Femur fracture (HCC)     GERD (gastroesophageal reflux disease)     HPV (human papilloma virus) infection     Hypertension     Irritable bowel syndrome     Morbid obesity with BMI of 50 0-59 9, adult ([facility] [address] )     Obesity     Pre-diabetes     Prediabetes 10/8/2018    Overview:  Per Prediabetes protocol #1  Sleep apnea      Past Surgical History:   Procedure Laterality Date    COLPOSCOPY  2018    FEMUR FRACTURE SURGERY      WISDOM TOOTH EXTRACTION       Social History     Socioeconomic History    Marital status: Single     Spouse name: Not on file    Number of children: Not on file    Years of education: Not on file    Highest education level: Not on file   Occupational History    Not on file   Social Needs    Financial resource strain: Not on file    Food insecurity:     Worry: Not on file     Inability: Not on file    Transportation needs:     Medical: Not on file     Non-medical: Not on file   Tobacco Use    Smoking status: Former Smoker     Packs/day: 0 00     Years: 0 00     Pack years: 0 00     Types: Cigarettes     Last attempt to quit: 2017     Years since quittin 8    Smokeless tobacco: Never Used    Tobacco comment: 0 75 ppd for 2-3 years; History of vaping    Substance and Sexual Activity    Alcohol use:  Yes     Alcohol/week: 1 0 standard drinks     Types: 1 Glasses of wine per week     Frequency: 2-4 times a month    Drug use: No    Sexual activity: Yes     Partners: Male     Birth control/protection: IUD     Comment: mirena inserted 18   Lifestyle    Physical activity:     Days per week: Not on file     Minutes per session: Not on file    Stress: Not on file   Relationships    Social connections:     Talks on phone: Not on file     Gets together: Not on file     Attends Church service: Not on file     Active member of club or organization: Not on file     Attends meetings of clubs or organizations: Not on file     Relationship status: Not on file    Intimate partner violence:     Fear of current or ex partner: Not on file     Emotionally abused: Not on file     Physically abused: Not on file     Forced sexual activity: Not on file   Other Topics Concern    Not on file   Social History Narrative    Lives with boyfriend and his kids (part time)     Works at [employer] Current Outpatient Medications:     baclofen 20 mg tablet, as needed , Disp: , Rfl:     buPROPion (WELLBUTRIN XL) 150 mg 24 hr tablet, Take 150 mg by mouth, Disp: , Rfl:     Cholecalciferol (VITAMIN D3) 69038 units TABS, Take by mouth daily, Disp: , Rfl:     diclofenac (VOLTAREN) 75 mg EC tablet, as needed , Disp: , Rfl:     docusate sodium (COLACE) 100 mg capsule, Take 100 mg by mouth 2 (two) times a day, Disp: , Rfl:     escitalopram (LEXAPRO) 10 mg tablet, , Disp: , Rfl:     famotidine (PEPCID) 10 mg tablet, Take 10 mg by mouth 2 (two) times a day, Disp: , Rfl:     hydrocortisone (ANUSOL-HC, PROCTOSOL HC,) 2 5 % rectal cream, Insert into the rectum 2 (two) times a day, Disp: 30 g, Rfl: 0    MIRENA, 52 MG, 20 MCG/24HR IUD, TO BE INSERTED ONE TIME BY PRESCRIBER  ROUTE INTRAUTERINE , Disp: , Rfl: 0    Multiple Vitamin (MULTI VITAMIN PO), Take by mouth daily, Disp: , Rfl:     phentermine 37 5 MG capsule, Take 1 capsule (37 5 mg total) by mouth every morning, Disp: 30 capsule, Rfl: 0    topiramate (TOPAMAX) 50 MG tablet, Take 2 tablets (100 mg total) by mouth daily at bedtime, Disp: 60 tablet, Rfl: 0    Food Intake and Lifestyle Assessment:   Pt brought manual to this visit - no  Pt is practicing 30/60-minute rule - yes - pt states she is working on this when she eats solid foods at dinnertime  Pt is getting at least 64 oz of sugar-free, caffeine-free, noncarbonated fluids daily - yes - pt states she drinks at least 64 ounces of water per day  Pt is exercising - no - pt states she has started PT for her knees twice per week    Pt states she is compliant with taking her Phentermine in the morning and Topamax at night      Food Intake Assessment completed via usual diet recall:  Breakfast: protein shake (Optimum or Syntrax Nectar or Premier Protein)  Snack: sometimes carrots   Lunch: another protein shake  Snack: a little baggie of non-starchy vegetables; sometimes a small bowl of Greek yogurt  Dinner: 1/2 plate of vegetables and 1/2 of plate is a lean protein (seafood, chicken, lean steak)  Snack: SF jell-o    Nutrition Diagnosis  Diagnosis: Overweight / Obesity (NC-3 3)  Related to: Physical inactivity and Excessive energy intake  As Evidenced by: BMI >25      Interventions and Teaching   Patient educated on post-op nutrition guidelines  Patient educated and handouts provided    Diet progression  Adequate hydration  Exercise  Suggestions for pre-op diet  Nutrition considerations after surgery  Protein supplements  Meal planning and preparation  Appropriate carbohydrate, protein, and fat intake, and food/fluid choices to maximize safe weight loss, nutrient intake, and tolerance   Dietary and lifestyle changes  Possible problems with poor eating habits  Intuitive eating  Techniques for self monitoring and keeping daily food journal  Potential for food intolerance after surgery, and ways to deal with them including: lactose intolerance, nausea, reflux, vomiting, diarrhea, food intolerance, appetite changes, gas  Vitamin / Mineral supplementation of Multivitamin with minerals and Vitamin D     Education provided to: patient  Barriers to learning: No barriers identified  Readiness to change: Preparation and Action  Comprehension: demonstrated understanding   Expected Compliance: good     Evaluation / Monitoring:  Eating pattern as discussed, Tolerance of nutrition prescription, Body weight, Lab values, Physical activity     Goals:  Eliminate sugar sweetened beverages, Food journal, Exercise 30 minutes 5 times per week, Complete lesson plans 1-6, Eat 3 meals per day, and Eliminate mindless snacking      Time Spent:   30 Minutes

## 2019-11-26 ENCOUNTER — OFFICE VISIT (OUTPATIENT)
Dept: PHYSICAL THERAPY | Facility: CLINIC | Age: 34
End: 2019-11-26
Payer: COMMERCIAL

## 2019-11-26 DIAGNOSIS — G89.29 CHRONIC PAIN OF LEFT KNEE: ICD-10-CM

## 2019-11-26 DIAGNOSIS — M25.561 CHRONIC PAIN OF RIGHT KNEE: Primary | ICD-10-CM

## 2019-11-26 DIAGNOSIS — M25.562 CHRONIC PAIN OF LEFT KNEE: ICD-10-CM

## 2019-11-26 DIAGNOSIS — G89.29 CHRONIC PAIN OF RIGHT KNEE: Primary | ICD-10-CM

## 2019-11-26 PROCEDURE — 97110 THERAPEUTIC EXERCISES: CPT

## 2019-11-26 PROCEDURE — 97140 MANUAL THERAPY 1/> REGIONS: CPT

## 2019-11-26 PROCEDURE — 97116 GAIT TRAINING THERAPY: CPT

## 2019-11-26 NOTE — PROGRESS NOTES
Daily Note     Today's date: 2019  Patient name: Nieves Ferreira  : 1985  MRN: [id number]  Referring provider: Haze Hodgkin  Dx:   Encounter Diagnosis     ICD-10-CM    1  Chronic pain of right knee M25 561     G89 29    2  Chronic pain of left knee M25 562     G89 29        Start Time: 2811  Stop Time: 1745  Total time in clinic (min): 55 minutes    Subjective: Patient reports knees feel about the same  Stated that tape is not staying on  Objective: See treatment diary below      Assessment: Taped knees with Kinesio tape but came off on the rec  Bike  Taped knees with Moncada technique with the Leuko tape which stayed on better  Patient experienced some discomfort in L knee with step ups  Good heel-toe motion ambulation on TM  Cueing for wider stride length  Slight circumduction noted on R side due to R hip weakness  C/o cracking in knees with ambulation but no pain  Plan: Continue per plan of care  Precautions: none      Manual              taping 10'                                                                    Exercise Diary             HEP pt ed 25'            taping 5'            bike  10'           TM gt   tr  5'           Step ups  6" 20x           Circuit legs  2x10           Vector 5                                                                                                                                                                                          Modalities

## 2019-12-03 PROBLEM — G47.33 OBSTRUCTIVE SLEEP APNEA: Status: ACTIVE | Noted: 2019-12-03

## 2019-12-03 PROBLEM — E66.01 MORBID OBESITY (HCC): Status: ACTIVE | Noted: 2019-12-03

## 2019-12-04 ENCOUNTER — OFFICE VISIT (OUTPATIENT)
Dept: BARIATRICS | Facility: CLINIC | Age: 34
End: 2019-12-04

## 2019-12-04 VITALS — BODY MASS INDEX: 41.95 KG/M2 | HEIGHT: 70 IN | WEIGHT: 293 LBS

## 2019-12-04 DIAGNOSIS — E66.01 MORBID (SEVERE) OBESITY DUE TO EXCESS CALORIES (HCC): Primary | ICD-10-CM

## 2019-12-04 PROCEDURE — RECHECK

## 2019-12-04 NOTE — PROGRESS NOTES
Bariatric Follow Up Nutrition Note    Pre-op  Pre-op program    Pt requires no weight checks per insurance requirements  This weight check is while pt is completing pre-op clearance process  Type of surgery  Surgery Date: TBD  Surgeon: Dr Medhat De La Cruz  35 y o   female    There were no vitals filed for this visit  Weight (last 2 days)     Date/Time   Weight    12/04/19 0814   (!) 154 (338 6)            Body mass index is 48 58 kg/m²  Height: 70 0 inches    Will discuss today's weight with Dr Marjorie Portillo prior to moving forward with her pre-op surgical process  Pt is scheduled for surgery this month and is scheduled to attend her pre-op class tomorrow  Reviewed pt's pre-operative workflow in Exemplo this visit:  Support Group: attended  PCP letter: received  Blood work: completed and cleared  Cardiology: completed  Sleep medicine: dx CELE and uses her CPAP - pt states she keeps track of it in her chacorta and states she averages 4-5 hours per night with it  Weight checks: none required  EGD: completed  Weight loss: 10%; 5% pre-op scheduling weight not achieved at this time    Pre-op Weight History:  Weight on Day of Initial Dietary Evaluation: 355 0 lbs  (BMI 50 9) 7/22/2019  Current Weight: 338 6 lbs  Pt has gained 0 6 lbs  since her last weight check  Pt has lost 16 4 lbs  since initial dietary evaluation  Pre-op goal weight: 319 5 lbs  (10% loss from pt's initial evaluation weight)  Weight to be scheduled for surgery after completing clearances: 337 3 lbs  (1/2 lost of required pre-op goal weight) - not achieved at this time    Weight in to have BMI = 25: 174 6 lbs      Review of History and Medications   Past Medical History:   Diagnosis Date    Abnormal Pap smear of cervix     Anxiety     ASCUS with positive high risk HPV cervical 11/6/2018    Back pain     Depressed     Femur fracture (HCC)     GERD (gastroesophageal reflux disease)     HPV (human papilloma virus) infection     Hypertension     Irritable bowel syndrome     Morbid obesity with BMI of 50 0-59 9, adult ([facility] [address] )     Obesity     Pre-diabetes     Prediabetes 10/8/2018    Overview:  Per Prediabetes protocol #1    Sleep apnea      Past Surgical History:   Procedure Laterality Date    COLPOSCOPY  2018    FEMUR FRACTURE SURGERY      WISDOM TOOTH EXTRACTION       Social History     Socioeconomic History    Marital status: Single     Spouse name: Not on file    Number of children: Not on file    Years of education: Not on file    Highest education level: Not on file   Occupational History    Not on file   Social Needs    Financial resource strain: Not on file    Food insecurity:     Worry: Not on file     Inability: Not on file    Transportation needs:     Medical: Not on file     Non-medical: Not on file   Tobacco Use    Smoking status: Former Smoker     Packs/day: 0 00     Years: 0 00     Pack years: 0 00     Types: Cigarettes     Last attempt to quit: 2017     Years since quittin 9    Smokeless tobacco: Never Used    Tobacco comment: 0 75 ppd for 2-3 years; History of vaping    Substance and Sexual Activity    Alcohol use:  Yes     Alcohol/week: 1 0 standard drinks     Types: 1 Glasses of wine per week     Frequency: 2-4 times a month    Drug use: No    Sexual activity: Yes     Partners: Male     Birth control/protection: IUD     Comment: mirena inserted 18   Lifestyle    Physical activity:     Days per week: Not on file     Minutes per session: Not on file    Stress: Not on file   Relationships    Social connections:     Talks on phone: Not on file     Gets together: Not on file     Attends Jainism service: Not on file     Active member of club or organization: Not on file     Attends meetings of clubs or organizations: Not on file     Relationship status: Not on file    Intimate partner violence:     Fear of current or ex partner: Not on file     Emotionally abused: Not on file Physically abused: Not on file     Forced sexual activity: Not on file   Other Topics Concern    Not on file   Social History Narrative    Lives with boyfriend and his kids (part time)     Works at [employer]        Current Outpatient Medications:     baclofen 20 mg tablet, as needed , Disp: , Rfl:     buPROPion (WELLBUTRIN XL) 150 mg 24 hr tablet, Take 150 mg by mouth, Disp: , Rfl:     Cholecalciferol (VITAMIN D3) 48561 units TABS, Take by mouth daily, Disp: , Rfl:     diclofenac (VOLTAREN) 75 mg EC tablet, as needed , Disp: , Rfl:     docusate sodium (COLACE) 100 mg capsule, Take 100 mg by mouth 2 (two) times a day, Disp: , Rfl:     escitalopram (LEXAPRO) 10 mg tablet, , Disp: , Rfl:     famotidine (PEPCID) 10 mg tablet, Take 10 mg by mouth 2 (two) times a day, Disp: , Rfl:     hydrocortisone (ANUSOL-HC, PROCTOSOL HC,) 2 5 % rectal cream, Insert into the rectum 2 (two) times a day, Disp: 30 g, Rfl: 0    MIRENA, 52 MG, 20 MCG/24HR IUD, TO BE INSERTED ONE TIME BY PRESCRIBER   ROUTE INTRAUTERINE , Disp: , Rfl: 0    Multiple Vitamin (MULTI VITAMIN PO), Take by mouth daily, Disp: , Rfl:     phentermine 37 5 MG capsule, Take 1 capsule (37 5 mg total) by mouth every morning, Disp: 30 capsule, Rfl: 0    topiramate (TOPAMAX) 50 MG tablet, Take 2 tablets (100 mg total) by mouth daily at bedtime, Disp: 60 tablet, Rfl: 0    Food Intake and Lifestyle Assessment:   Pt brought manual to this visit - no  Pt is practicing 30/60-minute rule - yes - pt states she is working on this when she eats solid foods at dinnertime  Pt is getting at least 64 oz of sugar-free, caffeine-free, noncarbonated fluids daily - yes - pt states she drinks at least 64 ounces of water per day  Pt is exercising - yes - pt states she has started PT for her knees twice per week    Food Intake Assessment completed via usual diet recall:  Breakfast: protein shake (Optimum or Syntrax Nectar or Premier Protein)  Snack: sometimes carrots   Lunch: another protein shake  Snack: a little baggie of non-starchy vegetables; sometimes a small bowl of Greek yogurt  Dinner: 1/2 plate of vegetables and 1/2 of plate is a lean protein (seafood, chicken, lean steak)  Snack: SF jell-o    Nutrition Diagnosis  Diagnosis: Overweight / Obesity (NC-3 3)  Related to: Physical inactivity and Excessive energy intake  As Evidenced by: BMI >25      Interventions and Teaching   Patient educated on post-op nutrition guidelines  Patient educated and handouts provided    Diet progression  Adequate hydration  Exercise  Suggestions for pre-op diet  Nutrition considerations after surgery  Protein supplements  Meal planning and preparation  Appropriate carbohydrate, protein, and fat intake, and food/fluid choices to maximize safe weight loss, nutrient intake, and tolerance   Dietary and lifestyle changes  Possible problems with poor eating habits  Intuitive eating  Techniques for self monitoring and keeping daily food journal  Potential for food intolerance after surgery, and ways to deal with them including: lactose intolerance, nausea, reflux, vomiting, diarrhea, food intolerance, appetite changes, gas  Vitamin / Mineral supplementation of Multivitamin with minerals and Vitamin D     Education provided to: patient  Barriers to learning: No barriers identified  Readiness to change: Preparation and Action  Comprehension: demonstrated understanding   Expected Compliance: good     Evaluation / Monitoring:  Eating pattern as discussed, Tolerance of nutrition prescription, Body weight, Lab values, Physical activity     Goals:  Eliminate sugar sweetened beverages, Food journal, Exercise 30 minutes 5 times per week, Complete lesson plans 1-6, Eat 3 meals per day, and Eliminate mindless snacking      Time Spent:   30 Minutes

## 2019-12-12 ENCOUNTER — CLINICAL SUPPORT (OUTPATIENT)
Dept: FAMILY MEDICINE CLINIC | Facility: CLINIC | Age: 34
End: 2019-12-12
Payer: COMMERCIAL

## 2019-12-12 DIAGNOSIS — Z23 ENCOUNTER FOR IMMUNIZATION: Primary | ICD-10-CM

## 2019-12-12 PROCEDURE — 90746 HEPB VACCINE 3 DOSE ADULT IM: CPT | Performed by: FAMILY MEDICINE

## 2019-12-12 PROCEDURE — 90471 IMMUNIZATION ADMIN: CPT | Performed by: FAMILY MEDICINE

## 2019-12-18 ENCOUNTER — TELEPHONE (OUTPATIENT)
Dept: OBGYN CLINIC | Facility: CLINIC | Age: 34
End: 2019-12-18

## 2019-12-18 ENCOUNTER — OFFICE VISIT (OUTPATIENT)
Dept: BARIATRICS | Facility: CLINIC | Age: 34
End: 2019-12-18
Payer: COMMERCIAL

## 2019-12-18 VITALS
SYSTOLIC BLOOD PRESSURE: 116 MMHG | BODY MASS INDEX: 41.95 KG/M2 | WEIGHT: 293 LBS | DIASTOLIC BLOOD PRESSURE: 84 MMHG | HEIGHT: 70 IN | TEMPERATURE: 98.5 F | HEART RATE: 88 BPM

## 2019-12-18 DIAGNOSIS — M19.90 DJD (DEGENERATIVE JOINT DISEASE): ICD-10-CM

## 2019-12-18 DIAGNOSIS — E66.01 MORBID (SEVERE) OBESITY DUE TO EXCESS CALORIES (HCC): Primary | ICD-10-CM

## 2019-12-18 DIAGNOSIS — K59.00 CONSTIPATION, UNSPECIFIED CONSTIPATION TYPE: Primary | ICD-10-CM

## 2019-12-18 DIAGNOSIS — F33.42 RECURRENT MAJOR DEPRESSIVE DISORDER, IN FULL REMISSION (HCC): ICD-10-CM

## 2019-12-18 DIAGNOSIS — G47.33 OBSTRUCTIVE SLEEP APNEA: ICD-10-CM

## 2019-12-18 DIAGNOSIS — E66.01 MORBID (SEVERE) OBESITY DUE TO EXCESS CALORIES (HCC): ICD-10-CM

## 2019-12-18 DIAGNOSIS — Z01.818 ENCOUNTER FOR OTHER PREPROCEDURAL EXAMINATION: Primary | ICD-10-CM

## 2019-12-18 DIAGNOSIS — E66.01 OBESITY, CLASS III, BMI 40-49.9 (MORBID OBESITY) (HCC): ICD-10-CM

## 2019-12-18 PROCEDURE — 99213 OFFICE O/P EST LOW 20 MIN: CPT | Performed by: SURGERY

## 2019-12-18 RX ORDER — CELECOXIB 200 MG/1
200 CAPSULE ORAL ONCE
Status: CANCELLED | OUTPATIENT
Start: 2019-12-18 | End: 2019-12-18

## 2019-12-18 RX ORDER — OMEPRAZOLE 40 MG/1
40 CAPSULE, DELAYED RELEASE ORAL DAILY
Qty: 90 CAPSULE | Refills: 1 | Status: SHIPPED | OUTPATIENT
Start: 2019-12-18 | End: 2020-02-20

## 2019-12-18 RX ORDER — HEPARIN SODIUM 5000 [USP'U]/ML
5000 INJECTION, SOLUTION INTRAVENOUS; SUBCUTANEOUS
Status: CANCELLED | OUTPATIENT
Start: 2019-12-19 | End: 2019-12-20

## 2019-12-18 RX ORDER — ACETAMINOPHEN 325 MG/1
975 TABLET ORAL ONCE
Status: CANCELLED | OUTPATIENT
Start: 2019-12-18 | End: 2019-12-18

## 2019-12-18 RX ORDER — OXYCODONE HYDROCHLORIDE 5 MG/1
5 TABLET ORAL EVERY 4 HOURS PRN
Qty: 10 TABLET | Refills: 0 | Status: SHIPPED | OUTPATIENT
Start: 2019-12-18 | End: 2020-01-10 | Stop reason: ALTCHOICE

## 2019-12-18 RX ORDER — SCOLOPAMINE TRANSDERMAL SYSTEM 1 MG/1
1 PATCH, EXTENDED RELEASE TRANSDERMAL ONCE
Status: CANCELLED | OUTPATIENT
Start: 2019-12-18 | End: 2019-12-18

## 2019-12-18 RX ORDER — GABAPENTIN 100 MG/1
600 CAPSULE ORAL ONCE
Status: CANCELLED | OUTPATIENT
Start: 2019-12-18 | End: 2019-12-18

## 2019-12-18 NOTE — TELEPHONE ENCOUNTER
Popeye Vance! Your bariatric surgery only effects oral contraception and absorption  So your IUD is just as effective, nothing is 100 %, but having the surgery does not mean its less effective, still good for 5 years  Your IUD is located in corrected position based on the ultrasound that was recently done  So also verifies good position so effective for contraception  Please let pt know thanks!

## 2019-12-18 NOTE — TELEPHONE ENCOUNTER
----- Message from [address] [place] [address]  Julienne Blinks sent at 12/18/2019 11:56 AM EST -----  Regarding: Non-Urgent Medical Question  Contact: [phone]  Ellis Scott,    I was told that after my bariatric surgery that I will be more fertile and I might need to have my IUD replaced even though it has not been 5 years  Is this true? Should I be using condoms along with the IUD or am I safe? Please let me know! Thanks!     Yashira Pascual

## 2019-12-18 NOTE — PROGRESS NOTES
H&P Exam - Bariatric Surgery   Alyse Lights 29 y o  female MRN: [id number]  Unit/Bed#:  Encounter: [id number]      HPI:    29 y o  yo morbidly obese female found to be a good candidate to undergo a weight loss operation upon being enrolled here at the [facility]   She has been pre certified to undergo a Laparoscopic lloyd-en-y gastric bypass  Here today to review her pre op test results  Has been medically cleared for the procedure  Associated Conditions: low HDL, sleep apnea and abdominal obesity  Disease Complications: sleep apnea, osteoarthritis and liver steatosis    I have discussed with her at length the risks and benefits of the operation and reiterated the components of our multidisciplinary program and the importance of compliance and follow up in the post operative period  Although there is a great statistical chance of improvement or even resolution of most of her associated comorbidities, the results vary from patient to patient and they largely depend on his commitment  The patient was also instructed with regards to the importance of behavior modification, nutritional counseling, support meeting attendance and lifestyle changes that are important to ensure success  She was given the opportunity to ask questions and I have answered all of them  I have addressed with the patient the level of CODE STATUS for this hospital stay and after explaining the different options currently she wishes to be a Level I  She understands and wishes to proceed  She has lost all the weight required prior to surgery  Review of Systems   Gastrointestinal: Positive for blood in stool (secondary to hemmorrhoids ) and constipation  Musculoskeletal: Positive for arthralgias, back pain and joint swelling  Neurological: Positive for headaches         Historical Information   Past Medical History:   Diagnosis Date    Abnormal Pap smear of cervix     Anxiety     ASCUS with positive high risk HPV cervical 2018    Back pain     Depressed     Femur fracture (HCC)     GERD (gastroesophageal reflux disease)     HPV (human papilloma virus) infection     Hypertension     Irritable bowel syndrome     Morbid obesity with BMI of 50 0-59 9, adult ([facility] [address] )     Obesity     Pre-diabetes     Prediabetes 10/8/2018    Overview:  Per Prediabetes protocol #1    Sleep apnea      Past Surgical History:   Procedure Laterality Date    COLPOSCOPY  2018    FEMUR FRACTURE SURGERY      WISDOM TOOTH EXTRACTION       Social History   Social History     Substance and Sexual Activity   Alcohol Use Yes    Alcohol/week: 1 0 standard drinks    Types: 1 Glasses of wine per week    Frequency: 2-4 times a month     Social History     Substance and Sexual Activity   Drug Use No     Social History     Tobacco Use   Smoking Status Former Smoker    Packs/day: 0 00    Years: 0 00    Pack years: 0 00    Types: Cigarettes    Last attempt to quit:     Years since quittin 9   Smokeless Tobacco Never Used   Tobacco Comment    0 75 ppd for 2-3 years; History of vaping      Family History: morbid obesity    Meds/Allergies   all medications and allergies reviewed  No Known Allergies    Objective     Current Vitals:   Blood Pressure: 116/84 (19 1516)  Pulse: 88 (19 1516)  Temperature: 98 5 °F (36 9 °C) (19 1516)  Temp Source: Tympanic (19 1516)  Height: 5' 10" (177 8 cm) (19 1516)  Weight - Scale: (!) 149 kg (328 lb 11 2 oz) (19 1516)        Physical Exam   Constitutional: She is oriented to person, place, and time  She appears well-developed and well-nourished  HENT:   Head: Atraumatic  Eyes: EOM are normal    Neck: Neck supple  Cardiovascular: Normal rate and regular rhythm  Pulmonary/Chest: Effort normal and breath sounds normal    Abdominal: Soft  She exhibits no distension  There is no tenderness  Musculoskeletal: Normal range of motion  Neurological: She is alert and oriented to person, place, and time  Skin: Skin is warm and dry  Psychiatric: She has a normal mood and affect  Her behavior is normal    Vitals reviewed  Lab Results: I have personally reviewed pertinent lab results  Imaging: I have personally reviewed pertinent reports  EKG, Pathology, and Other Studies: I have personally reviewed pertinent reports  Code Status: Level 1    Assessment:  29 y o  yo morbidly obese female found to be a good candidate to undergo a weight loss operation upon being enrolled here at the [facility]  She has completed all of the preoperative process for bariatric surgery      Plan:  - Plan for laparoscopic possible open lloyd-en-y gastric bypass (HS) with intraoperative EGD  - consent signed  - preop orders placed  - (320)  - Eliquis or lovenox for one month post op secondary to VTE/PE history

## 2019-12-23 ENCOUNTER — TELEPHONE (OUTPATIENT)
Dept: BARIATRICS | Facility: CLINIC | Age: 34
End: 2019-12-23

## 2019-12-23 NOTE — TELEPHONE ENCOUNTER
Pt emailed me about some side effects on her pre-op diet  Pt reports feeling weak, lightheaded, sick, nauseous, cold, and with headaches  Responded to pt and recommended the following:    - Stick with at least 2-3 shakes per day  - Continue to drink at least 80 ounces of those clear liquids  - Continue to have at least 2 cups of those non-starchy vegetables  - Add 3-4  servings of fruit per day - can be any fruit that you want  A serving is about ½-1 cup of fruit or a medium-sized whole fruit  I am hoping that these carbohydrates will stop your ketosis enough so that you arent at risk for injury, but they will still keep your calories low  - Add 1 regular meal per day that is about 400 calories that includes the following:  o 3-5 ounces of a lean meat - check out Chapter 3 in the manual for lean sources of protein - this size is typically the size of the palm of your hand or a little larger    o 1-2 cups of non-starchy vegetables (just like the ones that you are already having now)  o ½ cup of a complex carbohydrate - i e  brown rice, while wheat pasta, fruit, 617% whole wheat slice of bread, etc  This side alone should total about 100 calories of the 400 calorie meal     Provided support to pt and reminded her of the importance of taking care of herself, even if this means she doesn't meet her required weight on the day of surgery

## 2019-12-23 NOTE — PRE-PROCEDURE INSTRUCTIONS
My Surgical Experience    The following information was developed to assist you to prepare for your operation  What do I need to do before coming to the hospital?   Arrange for a responsible person to drive you to and from the hospital    Arrange care for your children at home  Children are not allowed in the recovery areas of the hospital   Plan to wear clothing that is easy to put on and take off  If you are having shoulder surgery, wear a shirt that buttons or zippers in the front  Bathing  o Shower the evening before and the morning of your surgery with an antibacterial soap  Please refer to the Pre Op Showering Instructions for Surgery Patients Sheet   o Remove nail polish and all body piercing jewelry  o Do not shave any body part for at least 24 hours before surgery-this includes face, arms, legs and upper body  Food  o Nothing to eat or drink after midnight the night before your surgery  This includes candy and chewing gum  o Exception: If your surgery is after 12:00pm (noon), you may have clear liquids such as 7-Up®, ginger ale, apple or cranberry juice, Jell-O®, water, or clear broth until 8:00 am  o Do not drink milk or juice with pulp on the morning before surgery  o Do not drink alcohol 24 hours before surgery  Medicine  o Follow instructions you received from your surgeon about which medicines you may take on the day of surgery  o If instructed to take medicine on the morning of surgery, take pills with just a small sip of water  Call your prescribing doctor for specific infroamtion on what to do if you take insulin    What should I bring to the hospital?    Bring:  Carlos Barrow or a walker, if you have them, for foot or knee surgery   A list of the daily medicines, vitamins, minerals, herbals and nutritional supplements you take   Include the dosages of medicines and the time you take them each day   Glasses, dentures or hearing aids   Minimal clothing; you will be wearing hospital sleepwear   Photo ID; required to verify your identity   If you have a Living Will or Power of , bring a copy of the documents   If you have an ostomy, bring an extra pouch and any supplies you use    Do not bring   Medicines or inhalers   Money, valuables or jewelry    What other information should I know about the day of surgery?  Notify your surgeons if you develop a cold, sore throat, cough, fever, rash or any other illness   Report to the Ambulatory Surgical/Same Day Surgery Unit   You will be instructed to stop at Registration only if you have not been pre-registered   Inform your  fi they do not stay that they will be asked by the staff to leave a phone number where they can be reached   Be available to be reached before surgery  In the event the operating room schedule changes, you may be asked to come in earlier or later than expected    *It is important to tell your doctor and others involved in your health care if you are taking or have been taking any non-prescription drugs, vitamins, minerals, herbals or other nutritional supplements  Any of these may interact with some food or medicines and cause a reaction      Pre-Surgery Instructions:   Medication Instructions    baclofen 20 mg tablet Instructed patient per Anesthesia Guidelines   buPROPion (WELLBUTRIN XL) 150 mg 24 hr tablet Instructed patient per Anesthesia Guidelines   Cholecalciferol (VITAMIN D3) 45801 units TABS Instructed patient per Anesthesia Guidelines   docusate sodium (COLACE) 100 mg capsule Instructed patient per Anesthesia Guidelines   escitalopram (LEXAPRO) 10 mg tablet Instructed patient per Anesthesia Guidelines   linaCLOtide (LINZESS) 145 MCG CAPS Instructed patient per Anesthesia Guidelines   omeprazole (PriLOSEC) 40 MG capsule Instructed patient per Anesthesia Guidelines   oxyCODONE (ROXICODONE) 5 mg immediate release tablet Instructed patient per Anesthesia Guidelines      To take wellbutrin and lexapro a nitin  Of surgery

## 2019-12-26 ENCOUNTER — ANESTHESIA EVENT (OUTPATIENT)
Dept: PERIOP | Facility: HOSPITAL | Age: 34
DRG: 621 | End: 2019-12-26
Payer: COMMERCIAL

## 2019-12-30 ENCOUNTER — HOSPITAL ENCOUNTER (INPATIENT)
Facility: HOSPITAL | Age: 34
LOS: 1 days | Discharge: HOME | DRG: 621 | End: 2019-12-31
Attending: SURGERY | Admitting: SURGERY
Payer: COMMERCIAL

## 2019-12-30 ENCOUNTER — ANESTHESIA (OUTPATIENT)
Dept: PERIOP | Facility: HOSPITAL | Age: 34
DRG: 621 | End: 2019-12-30
Payer: COMMERCIAL

## 2019-12-30 DIAGNOSIS — Z98.84 S/P GASTRIC BYPASS: Primary | ICD-10-CM

## 2019-12-30 DIAGNOSIS — E66.01 MORBID (SEVERE) OBESITY DUE TO EXCESS CALORIES (HCC): ICD-10-CM

## 2019-12-30 LAB
EXT PREGNANCY TEST URINE: NEGATIVE
EXT. CONTROL: NORMAL

## 2019-12-30 PROCEDURE — 81025 URINE PREGNANCY TEST: CPT | Performed by: SURGERY

## 2019-12-30 PROCEDURE — 87081 CULTURE SCREEN ONLY: CPT | Performed by: SURGERY

## 2019-12-30 PROCEDURE — 0DJ08ZZ INSPECTION OF UPPER INTESTINAL TRACT, VIA NATURAL OR ARTIFICIAL OPENING ENDOSCOPIC: ICD-10-PCS | Performed by: SURGERY

## 2019-12-30 PROCEDURE — 43644 LAP GASTRIC BYPASS/ROUX-EN-Y: CPT | Performed by: SURGERY

## 2019-12-30 PROCEDURE — C9290 INJ, BUPIVACAINE LIPOSOME: HCPCS

## 2019-12-30 PROCEDURE — 0D164ZA BYPASS STOMACH TO JEJUNUM, PERCUTANEOUS ENDOSCOPIC APPROACH: ICD-10-PCS | Performed by: SURGERY

## 2019-12-30 PROCEDURE — NC001 PR NO CHARGE: Performed by: SURGERY

## 2019-12-30 PROCEDURE — C9290 INJ, BUPIVACAINE LIPOSOME: HCPCS | Performed by: SURGERY

## 2019-12-30 RX ORDER — ONDANSETRON 2 MG/ML
INJECTION INTRAMUSCULAR; INTRAVENOUS AS NEEDED
Status: DISCONTINUED | OUTPATIENT
Start: 2019-12-30 | End: 2019-12-30 | Stop reason: SURG

## 2019-12-30 RX ORDER — ROCURONIUM BROMIDE 10 MG/ML
INJECTION, SOLUTION INTRAVENOUS AS NEEDED
Status: DISCONTINUED | OUTPATIENT
Start: 2019-12-30 | End: 2019-12-30 | Stop reason: SURG

## 2019-12-30 RX ORDER — HEPARIN SODIUM 5000 [USP'U]/ML
5000 INJECTION, SOLUTION INTRAVENOUS; SUBCUTANEOUS
Status: COMPLETED | OUTPATIENT
Start: 2019-12-30 | End: 2019-12-30

## 2019-12-30 RX ORDER — SODIUM CHLORIDE 9 MG/ML
INJECTION, SOLUTION INTRAVENOUS CONTINUOUS PRN
Status: DISCONTINUED | OUTPATIENT
Start: 2019-12-30 | End: 2019-12-30 | Stop reason: SURG

## 2019-12-30 RX ORDER — KETOROLAC TROMETHAMINE 30 MG/ML
15 INJECTION, SOLUTION INTRAMUSCULAR; INTRAVENOUS EVERY 6 HOURS SCHEDULED
Status: DISCONTINUED | OUTPATIENT
Start: 2019-12-30 | End: 2019-12-31 | Stop reason: HOSPADM

## 2019-12-30 RX ORDER — ACETAMINOPHEN 325 MG/1
975 TABLET ORAL EVERY 6 HOURS SCHEDULED
Status: DISCONTINUED | OUTPATIENT
Start: 2019-12-30 | End: 2019-12-31 | Stop reason: HOSPADM

## 2019-12-30 RX ORDER — SODIUM CHLORIDE, SODIUM LACTATE, POTASSIUM CHLORIDE, CALCIUM CHLORIDE 600; 310; 30; 20 MG/100ML; MG/100ML; MG/100ML; MG/100ML
75 INJECTION, SOLUTION INTRAVENOUS CONTINUOUS
Status: DISCONTINUED | OUTPATIENT
Start: 2019-12-30 | End: 2019-12-31 | Stop reason: HOSPADM

## 2019-12-30 RX ORDER — METOCLOPRAMIDE HYDROCHLORIDE 5 MG/ML
10 INJECTION INTRAMUSCULAR; INTRAVENOUS EVERY 6 HOURS PRN
Status: DISCONTINUED | OUTPATIENT
Start: 2019-12-30 | End: 2019-12-31 | Stop reason: HOSPADM

## 2019-12-30 RX ORDER — LORAZEPAM 2 MG/ML
0.5 INJECTION INTRAMUSCULAR EVERY 6 HOURS PRN
Status: DISCONTINUED | OUTPATIENT
Start: 2019-12-30 | End: 2019-12-31 | Stop reason: HOSPADM

## 2019-12-30 RX ORDER — HYDROMORPHONE HCL/PF 1 MG/ML
0.5 SYRINGE (ML) INJECTION
Status: DISCONTINUED | OUTPATIENT
Start: 2019-12-30 | End: 2019-12-30 | Stop reason: HOSPADM

## 2019-12-30 RX ORDER — PROPOFOL 10 MG/ML
INJECTION, EMULSION INTRAVENOUS CONTINUOUS PRN
Status: DISCONTINUED | OUTPATIENT
Start: 2019-12-30 | End: 2019-12-30 | Stop reason: SURG

## 2019-12-30 RX ORDER — DEXAMETHASONE SODIUM PHOSPHATE 4 MG/ML
INJECTION, SOLUTION INTRA-ARTICULAR; INTRALESIONAL; INTRAMUSCULAR; INTRAVENOUS; SOFT TISSUE AS NEEDED
Status: DISCONTINUED | OUTPATIENT
Start: 2019-12-30 | End: 2019-12-30 | Stop reason: SURG

## 2019-12-30 RX ORDER — SODIUM CHLORIDE, SODIUM LACTATE, POTASSIUM CHLORIDE, CALCIUM CHLORIDE 600; 310; 30; 20 MG/100ML; MG/100ML; MG/100ML; MG/100ML
125 INJECTION, SOLUTION INTRAVENOUS CONTINUOUS
Status: DISCONTINUED | OUTPATIENT
Start: 2019-12-30 | End: 2019-12-30

## 2019-12-30 RX ORDER — ONDANSETRON 2 MG/ML
4 INJECTION INTRAMUSCULAR; INTRAVENOUS ONCE AS NEEDED
Status: DISCONTINUED | OUTPATIENT
Start: 2019-12-30 | End: 2019-12-30 | Stop reason: HOSPADM

## 2019-12-30 RX ORDER — HYDROMORPHONE HCL/PF 1 MG/ML
1 SYRINGE (ML) INJECTION EVERY 4 HOURS PRN
Status: DISCONTINUED | OUTPATIENT
Start: 2019-12-30 | End: 2019-12-31 | Stop reason: HOSPADM

## 2019-12-30 RX ORDER — OXYCODONE HCL 5 MG/5 ML
5 SOLUTION, ORAL ORAL EVERY 4 HOURS PRN
Status: DISCONTINUED | OUTPATIENT
Start: 2019-12-30 | End: 2019-12-31 | Stop reason: HOSPADM

## 2019-12-30 RX ORDER — ACETAMINOPHEN 160 MG/5ML
975 SUSPENSION, ORAL (FINAL DOSE FORM) ORAL EVERY 6 HOURS SCHEDULED
Status: DISCONTINUED | OUTPATIENT
Start: 2019-12-30 | End: 2019-12-30

## 2019-12-30 RX ORDER — LIDOCAINE HYDROCHLORIDE 10 MG/ML
INJECTION, SOLUTION EPIDURAL; INFILTRATION; INTRACAUDAL; PERINEURAL AS NEEDED
Status: DISCONTINUED | OUTPATIENT
Start: 2019-12-30 | End: 2019-12-30 | Stop reason: SURG

## 2019-12-30 RX ORDER — MAGNESIUM HYDROXIDE 1200 MG/15ML
LIQUID ORAL AS NEEDED
Status: DISCONTINUED | OUTPATIENT
Start: 2019-12-30 | End: 2019-12-30 | Stop reason: HOSPADM

## 2019-12-30 RX ORDER — GABAPENTIN 300 MG/1
600 CAPSULE ORAL ONCE
Status: COMPLETED | OUTPATIENT
Start: 2019-12-30 | End: 2019-12-30

## 2019-12-30 RX ORDER — BUPIVACAINE HYDROCHLORIDE 5 MG/ML
INJECTION, SOLUTION PERINEURAL AS NEEDED
Status: DISCONTINUED | OUTPATIENT
Start: 2019-12-30 | End: 2019-12-30 | Stop reason: HOSPADM

## 2019-12-30 RX ORDER — OXYCODONE HCL 5 MG/5 ML
10 SOLUTION, ORAL ORAL EVERY 4 HOURS PRN
Status: DISCONTINUED | OUTPATIENT
Start: 2019-12-30 | End: 2019-12-31 | Stop reason: HOSPADM

## 2019-12-30 RX ORDER — MIDAZOLAM HYDROCHLORIDE 2 MG/2ML
INJECTION, SOLUTION INTRAMUSCULAR; INTRAVENOUS AS NEEDED
Status: DISCONTINUED | OUTPATIENT
Start: 2019-12-30 | End: 2019-12-30 | Stop reason: SURG

## 2019-12-30 RX ORDER — ESCITALOPRAM OXALATE 10 MG/1
10 TABLET ORAL DAILY
Status: DISCONTINUED | OUTPATIENT
Start: 2019-12-30 | End: 2019-12-31 | Stop reason: HOSPADM

## 2019-12-30 RX ORDER — SODIUM CHLORIDE, SODIUM LACTATE, POTASSIUM CHLORIDE, CALCIUM CHLORIDE 600; 310; 30; 20 MG/100ML; MG/100ML; MG/100ML; MG/100ML
100 INJECTION, SOLUTION INTRAVENOUS CONTINUOUS
Status: DISCONTINUED | OUTPATIENT
Start: 2019-12-30 | End: 2019-12-30

## 2019-12-30 RX ORDER — ONDANSETRON 2 MG/ML
4 INJECTION INTRAMUSCULAR; INTRAVENOUS EVERY 4 HOURS PRN
Status: DISCONTINUED | OUTPATIENT
Start: 2019-12-30 | End: 2019-12-31 | Stop reason: HOSPADM

## 2019-12-30 RX ORDER — PROPOFOL 10 MG/ML
INJECTION, EMULSION INTRAVENOUS AS NEEDED
Status: DISCONTINUED | OUTPATIENT
Start: 2019-12-30 | End: 2019-12-30 | Stop reason: SURG

## 2019-12-30 RX ORDER — CELECOXIB 100 MG/1
200 CAPSULE ORAL ONCE
Status: COMPLETED | OUTPATIENT
Start: 2019-12-30 | End: 2019-12-30

## 2019-12-30 RX ORDER — HEPARIN SODIUM 5000 [USP'U]/ML
5000 INJECTION, SOLUTION INTRAVENOUS; SUBCUTANEOUS EVERY 8 HOURS SCHEDULED
Status: DISCONTINUED | OUTPATIENT
Start: 2019-12-30 | End: 2019-12-31 | Stop reason: HOSPADM

## 2019-12-30 RX ORDER — ACETAMINOPHEN 325 MG/1
975 TABLET ORAL ONCE
Status: COMPLETED | OUTPATIENT
Start: 2019-12-30 | End: 2019-12-30

## 2019-12-30 RX ORDER — FENTANYL CITRATE 50 UG/ML
INJECTION, SOLUTION INTRAMUSCULAR; INTRAVENOUS AS NEEDED
Status: DISCONTINUED | OUTPATIENT
Start: 2019-12-30 | End: 2019-12-30 | Stop reason: SURG

## 2019-12-30 RX ORDER — SCOLOPAMINE TRANSDERMAL SYSTEM 1 MG/1
1 PATCH, EXTENDED RELEASE TRANSDERMAL ONCE
Status: DISCONTINUED | OUTPATIENT
Start: 2019-12-30 | End: 2019-12-31 | Stop reason: HOSPADM

## 2019-12-30 RX ORDER — PROMETHAZINE HYDROCHLORIDE 25 MG/ML
25 INJECTION, SOLUTION INTRAMUSCULAR; INTRAVENOUS EVERY 6 HOURS PRN
Status: DISCONTINUED | OUTPATIENT
Start: 2019-12-30 | End: 2019-12-31 | Stop reason: HOSPADM

## 2019-12-30 RX ORDER — SIMETHICONE 80 MG
80 TABLET,CHEWABLE ORAL EVERY 12 HOURS SCHEDULED
Status: DISCONTINUED | OUTPATIENT
Start: 2019-12-30 | End: 2019-12-31 | Stop reason: HOSPADM

## 2019-12-30 RX ORDER — CEFAZOLIN SODIUM 2 G/50ML
2000 SOLUTION INTRAVENOUS EVERY 8 HOURS
Status: COMPLETED | OUTPATIENT
Start: 2019-12-30 | End: 2019-12-31

## 2019-12-30 RX ADMIN — FAMOTIDINE 20 MG: 10 INJECTION, SOLUTION INTRAVENOUS at 21:16

## 2019-12-30 RX ADMIN — SIMETHICONE CHEW TAB 80 MG 80 MG: 80 TABLET ORAL at 21:16

## 2019-12-30 RX ADMIN — ACETAMINOPHEN 975 MG: 325 TABLET, FILM COATED ORAL at 06:04

## 2019-12-30 RX ADMIN — METRONIDAZOLE 500 MG: 500 INJECTION, SOLUTION INTRAVENOUS at 14:40

## 2019-12-30 RX ADMIN — LIDOCAINE HYDROCHLORIDE 50 MG: 10 INJECTION, SOLUTION EPIDURAL; INFILTRATION; INTRACAUDAL; PERINEURAL at 07:37

## 2019-12-30 RX ADMIN — HYDROMORPHONE HYDROCHLORIDE 0.5 MG: 1 INJECTION, SOLUTION INTRAMUSCULAR; INTRAVENOUS; SUBCUTANEOUS at 10:23

## 2019-12-30 RX ADMIN — SIMETHICONE CHEW TAB 80 MG 80 MG: 80 TABLET ORAL at 14:39

## 2019-12-30 RX ADMIN — SODIUM CHLORIDE: 0.9 INJECTION, SOLUTION INTRAVENOUS at 07:45

## 2019-12-30 RX ADMIN — KETOROLAC TROMETHAMINE 15 MG: 30 INJECTION, SOLUTION INTRAMUSCULAR at 17:38

## 2019-12-30 RX ADMIN — Medication 0.2 MCG: at 07:41

## 2019-12-30 RX ADMIN — CEFAZOLIN SODIUM 2000 MG: 2 SOLUTION INTRAVENOUS at 17:38

## 2019-12-30 RX ADMIN — OXYCODONE HYDROCHLORIDE 5 MG: 5 SOLUTION ORAL at 21:23

## 2019-12-30 RX ADMIN — Medication 3000 MG: at 07:31

## 2019-12-30 RX ADMIN — SODIUM CHLORIDE, SODIUM LACTATE, POTASSIUM CHLORIDE, AND CALCIUM CHLORIDE 125 ML/HR: .6; .31; .03; .02 INJECTION, SOLUTION INTRAVENOUS at 06:25

## 2019-12-30 RX ADMIN — DEXAMETHASONE SODIUM PHOSPHATE 8 MG: 4 INJECTION, SOLUTION INTRA-ARTICULAR; INTRALESIONAL; INTRAMUSCULAR; INTRAVENOUS; SOFT TISSUE at 08:19

## 2019-12-30 RX ADMIN — SODIUM CHLORIDE, SODIUM LACTATE, POTASSIUM CHLORIDE, AND CALCIUM CHLORIDE 75 ML/HR: .6; .31; .03; .02 INJECTION, SOLUTION INTRAVENOUS at 12:05

## 2019-12-30 RX ADMIN — GABAPENTIN 600 MG: 300 CAPSULE ORAL at 06:05

## 2019-12-30 RX ADMIN — HEPARIN SODIUM 5000 UNITS: 5000 INJECTION INTRAVENOUS; SUBCUTANEOUS at 21:24

## 2019-12-30 RX ADMIN — PROPOFOL 200 MG: 10 INJECTION, EMULSION INTRAVENOUS at 07:37

## 2019-12-30 RX ADMIN — Medication 0.2 MCG/KG/MIN: at 07:40

## 2019-12-30 RX ADMIN — HEPARIN SODIUM 5000 UNITS: 5000 INJECTION, SOLUTION INTRAVENOUS; SUBCUTANEOUS at 06:08

## 2019-12-30 RX ADMIN — ACETAMINOPHEN 975 MG: 325 TABLET, FILM COATED ORAL at 18:27

## 2019-12-30 RX ADMIN — ROCURONIUM BROMIDE 30 MG: 10 INJECTION, SOLUTION INTRAVENOUS at 08:24

## 2019-12-30 RX ADMIN — PROPOFOL 100 MG: 10 INJECTION, EMULSION INTRAVENOUS at 07:40

## 2019-12-30 RX ADMIN — ACETAMINOPHEN 975 MG: 160 SUSPENSION ORAL at 14:38

## 2019-12-30 RX ADMIN — FAMOTIDINE 20 MG: 10 INJECTION, SOLUTION INTRAVENOUS at 12:08

## 2019-12-30 RX ADMIN — FENTANYL CITRATE 50 MCG: 50 INJECTION, SOLUTION INTRAMUSCULAR; INTRAVENOUS at 08:01

## 2019-12-30 RX ADMIN — HYDROMORPHONE HYDROCHLORIDE 0.5 MG: 1 INJECTION, SOLUTION INTRAMUSCULAR; INTRAVENOUS; SUBCUTANEOUS at 10:41

## 2019-12-30 RX ADMIN — CELECOXIB 200 MG: 100 CAPSULE ORAL at 06:05

## 2019-12-30 RX ADMIN — FENTANYL CITRATE 100 MCG: 50 INJECTION, SOLUTION INTRAMUSCULAR; INTRAVENOUS at 07:35

## 2019-12-30 RX ADMIN — PROPOFOL 100 MCG/KG/MIN: 10 INJECTION, EMULSION INTRAVENOUS at 07:43

## 2019-12-30 RX ADMIN — ONDANSETRON 4 MG: 2 INJECTION INTRAMUSCULAR; INTRAVENOUS at 08:19

## 2019-12-30 RX ADMIN — ROCURONIUM BROMIDE 50 MG: 10 INJECTION, SOLUTION INTRAVENOUS at 07:37

## 2019-12-30 RX ADMIN — SCOPALAMINE 1 PATCH: 1 PATCH, EXTENDED RELEASE TRANSDERMAL at 06:04

## 2019-12-30 RX ADMIN — MIDAZOLAM HYDROCHLORIDE 2 MG: 1 INJECTION, SOLUTION INTRAMUSCULAR; INTRAVENOUS at 07:28

## 2019-12-30 RX ADMIN — FENTANYL CITRATE 50 MCG: 50 INJECTION, SOLUTION INTRAMUSCULAR; INTRAVENOUS at 07:50

## 2019-12-30 RX ADMIN — METRONIDAZOLE 500 MG: 500 INJECTION, SOLUTION INTRAVENOUS at 07:42

## 2019-12-30 RX ADMIN — PHENYLEPHRINE HYDROCHLORIDE 100 MCG: 10 INJECTION INTRAVENOUS at 08:30

## 2019-12-30 RX ADMIN — PROPOFOL 100 MG: 10 INJECTION, EMULSION INTRAVENOUS at 07:41

## 2019-12-30 RX ADMIN — Medication 0.2 MCG: at 07:43

## 2019-12-30 NOTE — ANESTHESIA POSTPROCEDURE EVALUATION
Post-Op Assessment Note    CV Status:  Stable  Pain Score: 0    Pain management: adequate     Mental Status:  Sleepy and arousable   Hydration Status:  Stable   PONV Controlled:  Controlled   Airway Patency:  Patent and adequate   Post Op Vitals Reviewed: Yes      Staff: CRNA           BP      Temp      Pulse     Resp      SpO2

## 2019-12-30 NOTE — RESPIRATORY THERAPY NOTE
RT Protocol Note  Cintia Finley 29 y o  female MRN: [id number]  Unit/Bed#: [address] [place] [address] Encounter: [id number]    Assessment    Principal Problem: Morbid obesity ([facility] )  Active Problems:    Obstructive sleep apnea      Home Pulmonary Medications:  CPAP for HS  Home Devices/Therapy: BiPAP/CPAP    Past Medical History:   Diagnosis Date    Abnormal Pap smear of cervix     Anxiety     ASCUS with positive high risk HPV cervical 2018    Back pain     CPAP (continuous positive airway pressure) dependence     Depressed     Femur fracture (HCC)     GERD (gastroesophageal reflux disease)     HPV (human papilloma virus) infection     Hypertension     Irritable bowel syndrome     Morbid obesity with BMI of 50 0-59 9, adult ([facility] )     Obesity     Pre-diabetes     Prediabetes 10/8/2018    Overview:  Per Prediabetes protocol #1    Sleep apnea      Social History     Socioeconomic History    Marital status: Single     Spouse name: None    Number of children: None    Years of education: None    Highest education level: None   Occupational History    None   Social Needs    Financial resource strain: None    Food insecurity:     Worry: None     Inability: None    Transportation needs:     Medical: None     Non-medical: None   Tobacco Use    Smoking status: Former Smoker     Packs/day: 0 00     Years: 0 00     Pack years: 0 00     Types: Cigarettes     Last attempt to quit:      Years since quittin 9    Smokeless tobacco: Never Used    Tobacco comment: 0 75 ppd for 2-3 years; History of vaping    Substance and Sexual Activity    Alcohol use:  Yes     Alcohol/week: 1 0 standard drinks     Types: 1 Glasses of wine per week     Frequency: 2-4 times a month    Drug use: No    Sexual activity: Yes     Partners: Male     Birth control/protection: IUD     Comment: mirena inserted 18   Lifestyle    Physical activity:     Days per week: None     Minutes per session: None    Stress: None Relationships    Social connections:     Talks on phone: None     Gets together: None     Attends [employer] service: None     Active member of club or organization: None     Attends meetings of clubs or organizations: None     Relationship status: None    Intimate partner violence:     Fear of current or ex partner: None     Emotionally abused: None     Physically abused: None     Forced sexual activity: None   Other Topics Concern    None   Social History Narrative    Lives with boyfriend and his kids (part time)     Works at [employer]        Subjective    Subjective Data: Pt awake and alert all alarms working at [address] [place] [address] [employer] [facility]    Objective    Physical Exam:   Assessment Type: Assess only  General Appearance: Alert, Awake  Respiratory Pattern: Normal  Chest Assessment: Chest expansion symmetrical  Bilateral Breath Sounds: Diminished  R Breath Sounds: Diminished  L Breath Sounds: Diminished  Cough: Non-productive  O2 Device: RA    Vitals:  Blood pressure 122/81, pulse 77, temperature 98 2 °F (36 8 °C), temperature source Oral, resp  rate 19, height 5' 10" (1 778 m), weight (!) 144 kg (317 lb 8 oz), SpO2 95 %, not currently breastfeeding  Imaging and other studies: I have personally reviewed pertinent reports        O2 Device: RA     Plan    Respiratory Plan: No distress/Pulmonary history        Resp Comments: pt awake and alert ETCO@ monitor applied (2) assistive person

## 2019-12-30 NOTE — H&P
Patient seen and examined by me  H&P updated  Original H&P on paper in chart      Jay Mercer MD  12/30/2019  6:50 AM

## 2019-12-30 NOTE — PLAN OF CARE
Problem: RESPIRATORY - ADULT  Goal: Achieves optimal ventilation and oxygenation  Description  INTERVENTIONS:  - Assess for changes in respiratory status  - Assess for changes in mentation and behavior  - Position to facilitate oxygenation and minimize respiratory effort  - Oxygen administered by appropriate delivery if ordered  - Encourage broncho-pulmonary hygiene including cough, deep breathe, Incentive Spirometry  - Assess and instruct to report SOB or any respiratory difficulty  - Respiratory Therapy support as indicated   Outcome: Progressing     Problem: PAIN - ADULT  Goal: Verbalizes/displays adequate comfort level or baseline comfort level  Description  Interventions:  - Encourage patient to monitor pain and request assistance  - Assess pain using appropriate pain scale  - Administer analgesics based on type and severity of pain and evaluate response  - Implement non-pharmacological measures as appropriate and evaluate response  - Consider cultural and social influences on pain and pain management  - Notify physician/advanced practitioner if interventions unsuccessful or patient reports new pain  Outcome: Progressing     Problem: SAFETY ADULT  Goal: Maintain or return to baseline ADL function  Description  INTERVENTIONS:  -  Assess patient's ability to carry out ADLs; assess patient's baseline for ADL function and identify physical deficits which impact ability to perform ADLs (bathing, care of mouth/teeth, toileting, grooming, dressing, etc )  - Assess/evaluate cause of self-care deficits   - Assess range of motion  - Assess patient's mobility; develop plan if impaired  - Assess patient's need for assistive devices and provide as appropriate  - Encourage maximum independence but intervene and supervise when necessary  - Involve family in performance of ADLs  - Assess for home care needs following discharge   - Consider OT consult to assist with ADL evaluation and planning for discharge  - Provide patient education as appropriate  Outcome: Progressing

## 2019-12-30 NOTE — ANESTHESIA PREPROCEDURE EVALUATION
Review of Systems/Medical History  Patient summary reviewed    No history of anesthetic complications     Cardiovascular  Exercise tolerance (METS): >4,  Hypertension ,    Pulmonary  Sleep apnea ,        GI/Hepatic    GERD ,        Negative  ROS        Endo/Other    Obesity  super morbid obesity   GYN       Hematology  Negative hematology ROS      Musculoskeletal    Arthritis     Neurology  Negative neurology ROS      Psychology   Anxiety, Depression ,              Physical Exam    Airway    Mallampati score: II  TM Distance: >3 FB  Neck ROM: full     Dental   No notable dental hx     Cardiovascular  Rhythm: regular, Rate: normal,     Pulmonary  Breath sounds clear to auscultation,     Other Findings        Anesthesia Plan  ASA Score- 3     Anesthesia Type- general with ASA Monitors  Additional Monitors:   Airway Plan: ETT  Plan Factors-  Patient did not smoke on day of surgery  Induction- intravenous  Postoperative Plan- Plan for postoperative opioid use  Planned trial extubation    Informed Consent- Anesthetic plan and risks discussed with patient  I personally reviewed this patient with the CRNA  Discussed and agreed on the Anesthesia Plan with the CRNA  Levon Gallegos

## 2019-12-30 NOTE — OP NOTE
OPERATIVE REPORT  PATIENT NAME: India Campbell    :  1985  MRN: [id number]  Pt Location: [facility] OR ROOM 02    SURGERY DATE: 2019    Surgeon(s) and Role:     * Jay Mercer MD - Primary     * Dorothea Childers MD - Assisting     * Polly Woodruff PA-C - Observing    Preop Diagnosis:  Morbid obesity ([facility] ) [E66 01]  Obstructive sleep apnea [G47 33]    Post-Op Diagnosis Codes:     * Morbid obesity ([facility] ) [E66 01]     * Obstructive sleep apnea [G47 33]    Procedure(s) (LRB):  BYPASS GASTRIC  ROWAN-EN-Y LAPAROSCOPIC WITH INTRAOPERATIVE EGD (N/A)    Specimen(s):  * No specimens in log *    Estimated Blood Loss:   20 mL    Drains:  * No LDAs found *    Anesthesia Type:   General    Operative Indications: Morbid obesity ([facility] ) [E66 01]  Obstructive sleep apnea [G47 33]      Operative Findings:  Negative leak test    Complications:   None    Procedure and Technique:  The patient was identified by name, armband and conversation  The patient was then brought to the operative theatre  After successful induction of general anesthesia, the patient was prepped and draped in the usual sterile fashion  A timeout was performed and all were in agreement  Optiview technique was used to gain entrance into the abdomen with a 12 mm 0 degree laparoscope in the left upper quadrant  Four 12 mm ports were then placed in the right lower quadrant, right upper quadrant, left lower quadrant, and umbilicus  A 5 mm epigastric liver retractor was placed  Four quadrant Exparel/Marcaine transversus abdominus plane block was performed  The omentum was split using ultrasonic temo  Starting at the ligament of treitz 50 cm of small bowel was counted and divided with Medtronic stapler, white load  Another 150 cm of small bowel was counted from here and a stapled jejunojejunostomy was created with the Medtronic stapler, white load  The enterotomy was closed with a running 3-0 PDS   The mesenteric defect was then closed with a running 2-0 ethibond  The gastroesophageal fat pad was dissected  Perigastric dissection was used to enter the lesser sac 5 cm distal to the gastroesophageal junction  Gastric pouch was then created with one horizontal firing and two vertical firings with the Medtronic stapler, purple load  The lloyd limb was then brought antecolic/antegastric and a handsewn end to side gastrojejunostomy was then created with 3-0 PDS in two layers over a 36 [employer] Niurka levacuator tube  The endoscope was then advanced past the posterior pharynx into the gastric pouch  Air leak test was negative and the anastomosis was hemostatic  Clips were used on the pouch and gastric remnant for additional hemostasis  Givens's space was then closed with a running 2-0 ethibond suture  The umbilical port was then closed with a transfascial 0 vicryl suture  All port sites were examined for bleeding as we exited the abdomen to ensure hemostasis  Port sites were then closed with monocryl and dermabond  All instrument, sponge and needle counts were correct  I was present for the entire procedure, A qualified resident physician was not available and an assistant was required during the procedure for retraction tissue handling,dissection and suturing, traction/countertraction and stapling      Patient Disposition:  PACU     SIGNATURE: Eugenia Deluca MD  DATE: December 30, 2019  TIME: 9:47 AM

## 2019-12-31 VITALS
OXYGEN SATURATION: 100 % | BODY MASS INDEX: 41.95 KG/M2 | TEMPERATURE: 98.7 F | SYSTOLIC BLOOD PRESSURE: 138 MMHG | HEART RATE: 68 BPM | DIASTOLIC BLOOD PRESSURE: 80 MMHG | WEIGHT: 293 LBS | HEIGHT: 70 IN | RESPIRATION RATE: 18 BRPM

## 2019-12-31 LAB
ANION GAP SERPL CALCULATED.3IONS-SCNC: 12 MMOL/L (ref 4–13)
BUN SERPL-MCNC: 10 MG/DL (ref 5–25)
CALCIUM SERPL-MCNC: 8.2 MG/DL (ref 8.3–10.1)
CHLORIDE SERPL-SCNC: 107 MMOL/L (ref 100–108)
CO2 SERPL-SCNC: 24 MMOL/L (ref 21–32)
CREAT SERPL-MCNC: 0.74 MG/DL (ref 0.6–1.3)
ERYTHROCYTE [DISTWIDTH] IN BLOOD BY AUTOMATED COUNT: 12.4 % (ref 11.6–15.1)
GFR SERPL CREATININE-BSD FRML MDRD: 106 ML/MIN/1.73SQ M
GLUCOSE SERPL-MCNC: 88 MG/DL (ref 65–140)
HCT VFR BLD AUTO: 33.9 % (ref 34.8–46.1)
HGB BLD-MCNC: 11.2 G/DL (ref 11.5–15.4)
MCH RBC QN AUTO: 30.4 PG (ref 26.8–34.3)
MCHC RBC AUTO-ENTMCNC: 33 G/DL (ref 31.4–37.4)
MCV RBC AUTO: 92 FL (ref 82–98)
MRSA NOSE QL CULT: NORMAL
PLATELET # BLD AUTO: 195 THOUSANDS/UL (ref 149–390)
PMV BLD AUTO: 10.4 FL (ref 8.9–12.7)
POTASSIUM SERPL-SCNC: 4 MMOL/L (ref 3.5–5.3)
RBC # BLD AUTO: 3.68 MILLION/UL (ref 3.81–5.12)
SODIUM SERPL-SCNC: 143 MMOL/L (ref 136–145)
WBC # BLD AUTO: 9.82 THOUSAND/UL (ref 4.31–10.16)

## 2019-12-31 PROCEDURE — 99024 POSTOP FOLLOW-UP VISIT: CPT | Performed by: SURGERY

## 2019-12-31 PROCEDURE — NC001 PR NO CHARGE: Performed by: SURGERY

## 2019-12-31 PROCEDURE — 85027 COMPLETE CBC AUTOMATED: CPT | Performed by: SURGERY

## 2019-12-31 PROCEDURE — 80048 BASIC METABOLIC PNL TOTAL CA: CPT | Performed by: SURGERY

## 2019-12-31 RX ORDER — ACETAMINOPHEN 325 MG/1
975 TABLET ORAL EVERY 6 HOURS SCHEDULED
Qty: 27 TABLET | Refills: 0
Start: 2019-12-31 | End: 2020-01-03

## 2019-12-31 RX ADMIN — CEFAZOLIN SODIUM 2000 MG: 2 SOLUTION INTRAVENOUS at 00:06

## 2019-12-31 RX ADMIN — FAMOTIDINE 20 MG: 10 INJECTION, SOLUTION INTRAVENOUS at 09:13

## 2019-12-31 RX ADMIN — SIMETHICONE CHEW TAB 80 MG 80 MG: 80 TABLET ORAL at 09:06

## 2019-12-31 RX ADMIN — KETOROLAC TROMETHAMINE 15 MG: 30 INJECTION, SOLUTION INTRAMUSCULAR at 06:14

## 2019-12-31 RX ADMIN — HEPARIN SODIUM 5000 UNITS: 5000 INJECTION INTRAVENOUS; SUBCUTANEOUS at 06:14

## 2019-12-31 RX ADMIN — SODIUM CHLORIDE, SODIUM LACTATE, POTASSIUM CHLORIDE, AND CALCIUM CHLORIDE 75 ML/HR: .6; .31; .03; .02 INJECTION, SOLUTION INTRAVENOUS at 01:59

## 2019-12-31 RX ADMIN — KETOROLAC TROMETHAMINE 15 MG: 30 INJECTION, SOLUTION INTRAMUSCULAR at 00:03

## 2019-12-31 RX ADMIN — METRONIDAZOLE 500 MG: 500 INJECTION, SOLUTION INTRAVENOUS at 01:03

## 2019-12-31 NOTE — DISCHARGE INSTRUCTIONS
Bariatric/Weight Loss Surgery  Hospital Discharge Instructions  1  ACTIVITY:  a  Progress as feels comfortable - a good rule is:  if you are doing something and it begins to hurt, stop doing the activity  Walk every hour while awake   b  You may walk stairs if you do so slowly  c  You may shower 48 hours after surgery  d  Use your incentive spirometer 10 times per hour while awake for 1 weeks  e  DO NOT drive for 48 hours after surgery  After those 48 hours, if you are still taking prescription pain medicine you must not drive until you have stopped taking them for 24 hours  Examples of such medication are Percocet, Darvocet, Oxycodone, Tylenol #3, and Tylenol with Codeine  Follow your pharmacists orders  AND no driving until cleared in office by your surgeon      2  DIET  a  Stay on a liquid diet for 7 days after your surgery date, sipping slowly  Refer to your manual for examples of choices  Remember to keep your liquids sugar free or low calorie  You may have protein drinks  Make sure to drink 48-64oz  Per day  b  [employer] may advance to the stage 3 (puree diet) one week after your surgery as indicated on your diet progression pamphlet given to you during this hospital stay  3  MEDICATIONS:  a  The abdominal nerve block will wear off during the first 1-2 days that you are home, and you may become sore  Continue to take your Tylenol and your pain medicine as prescribed on your hospital discharge medicine list  [employer] Payment may use liquid Tylenol or break/crush the tablets down, as you did in the hospital   b  Start vitamins and minerals when you get home  c  Start Eliquis today 12/31/19 - just take one dose in the afternoon and start with twice a day on 01/01/20  d  Anti-acid Medicine as per prescription  e  Other medicines as indicated on the Physician Patient Discharge Instructions form given to you at the time of discharge    f  Make sure that you are splitting your pill or tablet medicines in halves or fourths or even crushing them before you take them  Capsules should be opened and mixed with water or jello  You need to do this for at least 4 after surgery  Eventually you will be able to take your medicines the regular way as they were prescribed  You will need to consult with your Family Doctor in regards to all your prescribed medication, particularly those for blood pressure and diabetes  As you lose weight, medical conditions may change, requiring an alteration or elimination of the drug dose  g  DO NOT TAKE BIRTH CONTROL(BC) MEDICINES, INSERT BC VAGINAL RINGS, OR PLACE IUD OR ANY OTHER BC METHODS UNTIL 31 DAYS FROM DAY OF DISCHARGE FROM HOSPITAL  THIS PLACES YOU AT HIGH RISK FOR A POTENTIALLY LIFE THREATENING BLOOD CLOT  Remember to always use barrier methods for birth control and speak to your GYN about using two forms of birth control to start 31 days after surgery  It is very important to avoid pregnancy until at least 18-24 months after surgery  4  INCISION CARE  a  You may shower and get incisions wet 2 days after surgery  No soaking tub baths or swimming for 30 days after surgery  Keep abdominal area and incisions clean  Use soap and water to create a good lather and rinse off  Do not scrub incisions  b  If you have a drain, empty the drain as the nurses instructed  5  FOLLOW-UP APPOINTMENT should be made for one week after discharge  Call surgeons office at [phone] to schedule an appointment      6  CALL YOUR DOCTOR FOR:  pain not controlled by pain medications, a temperature greater than 101 5° F, any increase or change in drainage or redness from any incision, any vomiting or inability to keep liquids down, shortness of breath, shoulder pain, or bleeding

## 2019-12-31 NOTE — UTILIZATION REVIEW
Initial Clinical Review    Elective inpatient surgical procedure  Age/Sex: 29 y o  female  Surgery Date: 12/30/19  Procedure: BYPASS GASTRIC  ROWAN-EN-Y LAPAROSCOPIC WITH INTRAOPERATIVE EGD  Anesthesia: general  Operative Findings: Negative leak test  Admission Orders: Date/Time/Statement: Inpatient Admission Orders (From admission, onward)     Ordered        12/30/19 1129  Inpatient Admission  Once                   Orders Placed This Encounter   Procedures    Inpatient Admission     Standing Status:   Standing     Number of Occurrences:   1     Order Specific Question:   Admitting Physician     Answer:   Madisyn Newton     Order Specific Question:   Level of Care     Answer:   Med Surg [16]     Order Specific Question:   Estimated length of stay     Answer:   Inpatient Only Surgery     Vital Signs: /88 (BP Location: Right arm)   Pulse 93   Temp 98 3 °F (36 8 °C) (Oral)   Resp 18   Ht 5' 10" (1 778 m)   Wt (!) 144 kg (317 lb 8 oz)   SpO2 99%   BMI 45 56 kg/m²   Admission orders:  Telemetry  Urinary retention protocol  Diet: bariatric clear liquids  Mobility: ambulate  DVT Prophylaxis: venodynes  Medications/Pain Control:   Scheduled Medications:  acetaminophen 975 mg Oral Q6H [address]   escitalopram 10 mg Oral Daily   famotidine 20 mg Intravenous Q12H [address]   heparin (porcine) 5,000 Units Subcutaneous Q8H [address]   ketorolac 15 mg Intravenous Q6H [address]   scopolamine 1 patch Transdermal Once   simethicone 80 mg Oral Q12H [address]     Continuous IV Infusions:  lactated ringers 75 mL/hr Intravenous Continuous     PRN Meds:  HYDROmorphone 1 mg Intravenous Q4H PRN   LORazepam 0 5 mg Intravenous Q6H PRN   metoclopramide 10 mg Intravenous Q6H PRN   ondansetron 4 mg Intravenous Q4H PRN   oxyCODONE 10 mg Oral Q4H PRN   oxyCODONE 5 mg Oral Q4H PRN   promethazine 25 mg Intramuscular Q6H PRN     Network Utilization Review Department  [email or web address]  ATTENTION: Please call with any questions or concerns to [phone] and carefully listen to the prompts so that you are directed to the right person  All voicemails are confidential   Eric Rowland all requests for admission clinical reviews, approved or denied determinations and any other requests to dedicated fax number below belonging to the campus where the patient is receiving treatment   List of dedicated fax numbers for the Facilities:  [address] DENIALS (Administrative/Medical Necessity) [phone]   [address] (Maternity/NICU/Pediatrics) [phone]   Poonam Patel [phone]   [facility] [phone]   Urszula Ilana [phone]   [facility] [phone]   [address] [place] [address] [phone]   [facility]  [phone]   [address] [facility]  [address] [facility] 1000 [address] [facility] [phone]

## 2019-12-31 NOTE — NURSING NOTE
Patient discharged from [address] [place] [address]  IV's removed prior to discharge  Patient left via walking and was accompanied by her significant other  All discharge instructions were gone over with patient  Patient left with all their belongings  No prescriptions were written  All questions were answered

## 2019-12-31 NOTE — PROGRESS NOTES
Progress Note - Bariatric Surgery   India Campbell 29 y o  female MRN: [id number]  Unit/Bed#: [address] [place] [address] Encounter: [id number]      Subjective/Objective     Subjective: Patient with morbid obesity s/p lap Sandip-en-Y Gastric Bypass POD1  Tolerating liquid diet without nausea or vomiting, pain adequately controlled on oral pain medication, ambulating without assistance, voiding well, using incentive spirometer  Denies fevers, chills, sweats, SOB, CP, calf pain/swelling  Objective:    /88 (BP Location: Right arm)   Pulse 93   Temp 98 3 °F (36 8 °C) (Oral)   Resp 18   Ht 5' 10" (1 778 m)   Wt (!) 144 kg (317 lb 8 oz)   SpO2 99%   BMI 45 56 kg/m²       Intake/Output Summary (Last 24 hours) at 12/31/2019 0805  Last data filed at 12/30/2019 1115  Gross per 24 hour   Intake 1300 ml   Output 20 ml   Net 1280 ml       Invasive Devices     Peripheral Intravenous Line            Peripheral IV 12/30/19 Left Wrist 1 day    Peripheral IV 12/30/19 Right Antecubital 1 day                ROS: 10-point system completed  All negative except see HPI  Physical Exam    General Appearance:    Alert, cooperative, no distress, appears stated age   Head:    Normocephalic, without obvious abnormality, atraumatic   Lungs:     Clear to auscultation bilaterally, respirations unlabored   Heart:    Regular rate and rhythm, S1 and S2 normal, no murmur, rub    or gallop   Abdomen:     Soft, appropriate tenderness, bowel sounds active all four quadrants, non distended, incisions clean, dry, and intact   Extremities:   Extremities normal, atraumatic, no cyanosis or edema   Neurologic:   CNII-XII intact  Normal strength and sensation               Lab, Imaging and other studies:  I have personally reviewed pertinent lab results    , CBC:   Lab Results   Component Value Date    WBC 9 82 12/31/2019    HGB 11 2 (L) 12/31/2019    HCT 33 9 (L) 12/31/2019    MCV 92 12/31/2019     12/31/2019    MCH 30 4 12/31/2019    MCHC 33 0 12/31/2019    RDW 12 4 12/31/2019    MPV 10 4 12/31/2019   , CMP:   Lab Results   Component Value Date    SODIUM 143 12/31/2019    K 4 0 12/31/2019     12/31/2019    CO2 24 12/31/2019    BUN 10 12/31/2019    CREATININE 0 74 12/31/2019    CALCIUM 8 2 (L) 12/31/2019    EGFR 106 12/31/2019        VTE Mechanical Prophylaxis: sequential compression device, heparin    Assessment/Plan  1)  Morbid Obesity s/p lap Sandip-en-Y Gastric Bypass POD1 with stable post op course  Encourage PO fluids, ambulation, and incentive spirometry  If patient continues to tolerate adequate PO fluids will plan for D/C this afternoon  2) Hx  Of DVT/PE - has been on heparin, frequent ambulation  Start Eliquis one dose tonight  Patient verbalizes understanding  3) CELE - continue CPAP    Plan of care was discussed with patient and patient's nurse  Care plan discussed with Dr Linda Jackson  Dispo: Continue bariatric clear liquid diet, ambulation, incentive spirometry         Sidra Malave PA-C  12/31/2019  8:05 AM

## 2019-12-31 NOTE — PLAN OF CARE
Problem: RESPIRATORY - ADULT  Goal: Achieves optimal ventilation and oxygenation  Description  INTERVENTIONS:  - Assess for changes in respiratory status  - Assess for changes in mentation and behavior  - Position to facilitate oxygenation and minimize respiratory effort  - Oxygen administered by appropriate delivery if ordered  - Encourage broncho-pulmonary hygiene including cough, deep breathe, Incentive Spirometry  - Assess and instruct to report SOB or any respiratory difficulty  - Respiratory Therapy support as indicated   12/31/2019 1108 by Ange Collins RN  Outcome: Adequate for Discharge  12/31/2019 1027 by Ange Collins RN  Outcome: Progressing     Problem: PAIN - ADULT  Goal: Verbalizes/displays adequate comfort level or baseline comfort level  Description  Interventions:  - Encourage patient to monitor pain and request assistance  - Assess pain using appropriate pain scale  - Administer analgesics based on type and severity of pain and evaluate response  - Implement non-pharmacological measures as appropriate and evaluate response  - Consider cultural and social influences on pain and pain management  - Notify physician/advanced practitioner if interventions unsuccessful or patient reports new pain  12/31/2019 1108 by Ange Collins RN  Outcome: Adequate for Discharge  12/31/2019 1027 by Ange Collins RN  Outcome: Progressing     Problem: SAFETY ADULT  Goal: Maintain or return to baseline ADL function  Description  INTERVENTIONS:  -  Assess patient's ability to carry out ADLs; assess patient's baseline for ADL function and identify physical deficits which impact ability to perform ADLs (bathing, care of mouth/teeth, toileting, grooming, dressing, etc )  - Assess/evaluate cause of self-care deficits   - Assess range of motion  - Assess patient's mobility; develop plan if impaired  - Assess patient's need for assistive devices and provide as appropriate  - Encourage maximum independence but intervene and supervise when necessary  - Involve family in performance of ADLs  - Assess for home care needs following discharge   - Consider OT consult to assist with ADL evaluation and planning for discharge  - Provide patient education as appropriate  12/31/2019 1108 by Maximiliano Moya RN  Outcome: Adequate for Discharge  12/31/2019 1027 by Maximiliano Moya, RN  Outcome: Progressing

## 2020-01-02 ENCOUNTER — TELEPHONE (OUTPATIENT)
Dept: BARIATRICS | Facility: CLINIC | Age: 35
End: 2020-01-02

## 2020-01-06 ENCOUNTER — TELEPHONE (OUTPATIENT)
Dept: BARIATRICS | Facility: CLINIC | Age: 35
End: 2020-01-06

## 2020-01-07 RX ORDER — BUPROPION HYDROCHLORIDE 75 MG/1
75 TABLET ORAL DAILY
COMMUNITY
Start: 2019-12-23 | End: 2021-01-06 | Stop reason: SDUPTHER

## 2020-01-09 NOTE — PROGRESS NOTES
First Post-op Weight Management Appointment    Cesar Chaudhary  29 y o   female    Vitals:    01/10/20 0903   BP: 112/80   Pulse: 91   Temp: 97 6 °F (36 4 °C)     Weight (last 2 days)     Date/Time   Weight    01/10/20 0903   (!) 139 (306 1)            Body mass index is 43 92 kg/m²  Weight History:  Weight on Day of Initial Dietary Evaluation: 355 0 lbs  (BMI = 50 9)  Weight on Day of Metabolic and Bariatric Surgery: 317 0 lbs  (BMI = 45 5)  Current Weight: 306 1 lbs  (BMI = 43 9)  %EWL = 27%  Height = 70 0 inches  Diagnosis: Obesity - improved    Surgery: Gastric Bypass Laparoscopic  Date of Surgery: 12/30/2019  Bariatric Surgeon: Dr Anderson Patient    Evaluation / Monitoring:  Eating pattern as discussed, Tolerance of nutrition prescription, Body weight, Lab values, Physical activity, Bowel pattern     Topics discussed today include:   Fluid goals post-op  Protein goals post-op  Constipation  Chew food well  Exercise  Avoidance of alcohol  PPI use  Diet progression  Dumping syndrome  Protein supplements  Vitamin/mineral supplements and calcium supplements    Patient was able to verbalize basic diet (protein, fluid, vitamin and mineral) recommendations and possible nutrition-related complications  YES

## 2020-01-10 ENCOUNTER — OFFICE VISIT (OUTPATIENT)
Dept: BARIATRICS | Facility: CLINIC | Age: 35
End: 2020-01-10

## 2020-01-10 VITALS
BODY MASS INDEX: 41.95 KG/M2 | HEART RATE: 91 BPM | SYSTOLIC BLOOD PRESSURE: 112 MMHG | HEIGHT: 70 IN | TEMPERATURE: 97.6 F | WEIGHT: 293 LBS | DIASTOLIC BLOOD PRESSURE: 80 MMHG

## 2020-01-10 DIAGNOSIS — Z48.815 ENCOUNTER FOR SURGICAL AFTERCARE FOLLOWING SURGERY ON THE DIGESTIVE SYSTEM: ICD-10-CM

## 2020-01-10 DIAGNOSIS — Z98.84 S/P GASTRIC BYPASS: Primary | ICD-10-CM

## 2020-01-10 DIAGNOSIS — E66.01 OBESITY, CLASS III, BMI 40-49.9 (MORBID OBESITY) (HCC): ICD-10-CM

## 2020-01-10 DIAGNOSIS — M19.90 DJD (DEGENERATIVE JOINT DISEASE): ICD-10-CM

## 2020-01-10 DIAGNOSIS — G47.33 OBSTRUCTIVE SLEEP APNEA: ICD-10-CM

## 2020-01-10 DIAGNOSIS — E66.01 MORBID OBESITY (HCC): ICD-10-CM

## 2020-01-10 PROCEDURE — 99024 POSTOP FOLLOW-UP VISIT: CPT | Performed by: SURGERY

## 2020-01-10 RX ORDER — ACYCLOVIR 400 MG/1
TABLET ORAL AS NEEDED
COMMUNITY
Start: 2019-12-27 | End: 2021-12-27 | Stop reason: SDUPTHER

## 2020-01-10 NOTE — PROGRESS NOTES
FIRST POST-OPERATIVE VISIT - BARIATRIC SURGERY  Bel Lorenz 29 y o  female MRN: [id number]  Unit/Bed#:  Encounter: [id number]      HPI:  Bel Lorenz is a 29 y o  female who presents for the 1st postoperative visit following a laparoscopic Sandip-en-Y gastric bypass  She is doing well  Her pain is controlled  She is tolerating eliquis  Historical Information   Past Medical History:   Diagnosis Date    Abnormal Pap smear of cervix     Anxiety     ASCUS with positive high risk HPV cervical 11/6/2018    Back pain     CPAP (continuous positive airway pressure) dependence     Depressed     Femur fracture (HCC)     GERD (gastroesophageal reflux disease)     HPV (human papilloma virus) infection     Hypertension     Irritable bowel syndrome     Morbid obesity with BMI of 50 0-59 9, adult ([facility] [address] )     Obesity     Postgastrectomy malabsorption     Pre-diabetes     Prediabetes 10/8/2018    Overview:  Per Prediabetes protocol #1    Sleep apnea      Past Surgical History:   Procedure Laterality Date    COLPOSCOPY  9/2018    EGD      FEMUR FRACTURE SURGERY      MD LAP GASTRIC BYPASS/SANDIP-EN-Y N/A 12/30/2019    Procedure: BYPASS GASTRIC  SANDIP-EN-Y LAPAROSCOPIC WITH INTRAOPERATIVE EGD;  Surgeon: Darlene Cervantes MD;  Location: [facility];  Service: Bariatrics    WISDOM TOOTH EXTRACTION       Social History   Social History     Substance and Sexual Activity   Alcohol Use Yes    Alcohol/week: 1 0 standard drinks    Types: 1 Glasses of wine per week    Frequency: 2-4 times a month     Social History     Substance and Sexual Activity   Drug Use No     Social History     Tobacco Use   Smoking Status Former Smoker    Packs/day: 0 00    Years: 0 00    Pack years: 0 00    Types: Cigarettes    Last attempt to quit: 2017    Years since quitting: 3 0   Smokeless Tobacco Never Used   Tobacco Comment    0 75 ppd for 2-3 years;  History of vaping          Meds/Allergies   all medications and allergies reviewed  No Known Allergies    Objective     Current Vitals:   Blood Pressure: 112/80 (01/10/20 0903)  Pulse: 91 (01/10/20 0903)  Temperature: 97 6 °F (36 4 °C) (01/10/20 0903)  Temp Source: Tympanic (01/10/20 0903)  Height: 5' 10" (177 8 cm) (01/10/20 0903)  Weight - Scale: (!) 139 kg (306 lb 1 6 oz) (01/10/20 0903)     Invasive Devices     None                 Physical Exam   Constitutional: She is oriented to person, place, and time  She appears well-developed and well-nourished  Cardiovascular: Normal rate  Pulmonary/Chest: Effort normal    Abdominal: Soft  She exhibits no distension  There is no tenderness  Wounds C/D/I; no erythema; mild petechial rash surrounding incisions    Neurological: She is alert and oriented to person, place, and time  Skin: Skin is warm and dry  Rash noted  Psychiatric: She has a normal mood and affect  Her behavior is normal    Vitals reviewed  Assessment/Plan :    Patient is presenting for the first postoperative visit, patient hospital stay was uneventful without any complications, patient is doing well, has no complaints, is taking vitamins as instructed, currently tolerating the blenderized diet, will advance to soft diet  She will continue eliquis for 1 month (history of DVT/PE)  Patient will also be meeting with our dietician today to review vitamin and mineral supplements and also go over diet and emphasize postoperative commitment and compliance  The patient was also instructed to start exercising on a regular basis  However, I recommended no heavy lifting, or weight exercises for another 2 weeks  F/U in 4 weeks  Patient was instructed to call if develops nausea, vomiting, fever or chills

## 2020-01-10 NOTE — LETTER
January 10, 2020     Sadi Grider DO  [address]  [address]  [place] [address]    Patient: Ángela Balderrama   YOB: 1985   Date of Visit: 1/10/2020       Dear Dr Temple Osgood: Thank you for referring Eugenia Hilton to me for metabolic and bariatric surgery  Below are my notes for her first post-operative visit  If you have questions, please do not hesitate to call me  I look forward to following your patient along with you  Sincerely,        Sp Crowell MD        CC: No Recipients  Sp Crowell MD  1/10/2020 11:07 AM  Signed  FIRST POST-OPERATIVE VISIT - BARIATRIC SURGERY  Ángela Balderrama 29 y o  female MRN: [id number]  Unit/Bed#:  Encounter: [id number]      HPI:  Ángela Balderrama is a 29 y o  female who presents for the 1st postoperative visit following a laparoscopic Rowan-en-Y gastric bypass  She is doing well  Her pain is controlled  She is tolerating eliquis           Historical Information   Past Medical History:   Diagnosis Date    Abnormal Pap smear of cervix     Anxiety     ASCUS with positive high risk HPV cervical 11/6/2018    Back pain     CPAP (continuous positive airway pressure) dependence     Depressed     Femur fracture (HCC)     GERD (gastroesophageal reflux disease)     HPV (human papilloma virus) infection     Hypertension     Irritable bowel syndrome     Morbid obesity with BMI of 50 0-59 9, adult ([facility] [address] )     Obesity     Postgastrectomy malabsorption     Pre-diabetes     Prediabetes 10/8/2018    Overview:  Per Prediabetes protocol #1    Sleep apnea      Past Surgical History:   Procedure Laterality Date    COLPOSCOPY  9/2018    EGD      FEMUR FRACTURE SURGERY      NC LAP GASTRIC BYPASS/ROWAN-EN-Y N/A 12/30/2019    Procedure: BYPASS GASTRIC  ROWAN-EN-Y LAPAROSCOPIC WITH INTRAOPERATIVE EGD;  Surgeon: Sp Crowell MD;  Location: [address] [place] [address];  Service: [address] [facility]       Social History   Social History     Substance and Sexual Activity   Alcohol Use Yes    Alcohol/week: 1 0 standard drinks    Types: 1 Glasses of wine per week    Frequency: 2-4 times a month     Social History     Substance and Sexual Activity   Drug Use No     Social History     Tobacco Use   Smoking Status Former Smoker    Packs/day: 0 00    Years: 0 00    Pack years: 0 00    Types: Cigarettes    Last attempt to quit: 2017    Years since quitting: 3 0   Smokeless Tobacco Never Used   Tobacco Comment    0 75 ppd for 2-3 years; History of vaping          Meds/Allergies   all medications and allergies reviewed  No Known Allergies    Objective     Current Vitals:   Blood Pressure: 112/80 (01/10/20 0903)  Pulse: 91 (01/10/20 0903)  Temperature: 97 6 °F (36 4 °C) (01/10/20 0903)  Temp Source: Tympanic (01/10/20 0903)  Height: 5' 10" (177 8 cm) (01/10/20 0903)  Weight - Scale: (!) 139 kg (306 lb 1 6 oz) (01/10/20 0903)     Invasive Devices     None                 Physical Exam   Constitutional: She is oriented to person, place, and time  She appears well-developed and well-nourished  Cardiovascular: Normal rate  Pulmonary/Chest: Effort normal    Abdominal: Soft  She exhibits no distension  There is no tenderness  Wounds C/D/I; no erythema; mild petechial rash surrounding incisions    Neurological: She is alert and oriented to person, place, and time  Skin: Skin is warm and dry  Rash noted  Psychiatric: She has a normal mood and affect  Her behavior is normal    Vitals reviewed  Assessment/Plan :    Patient is presenting for the first postoperative visit, patient hospital stay was uneventful without any complications, patient is doing well, has no complaints, is taking vitamins as instructed, currently tolerating the blenderized diet, will advance to soft diet  She will continue eliquis for 1 month (history of DVT/PE)       Patient will also be meeting with our dietician today to review vitamin and mineral supplements and also go over diet and emphasize postoperative commitment and compliance  The patient was also instructed to start exercising on a regular basis  However, I recommended no heavy lifting, or weight exercises for another 2 weeks  F/U in 4 weeks  Patient was instructed to call if develops nausea, vomiting, fever or chills

## 2020-01-28 NOTE — PROGRESS NOTES
Post-op Weight Management Nutrition Class    Edson Gonzalez  29 y o   female    There were no vitals filed for this visit  Weight (last 2 days)     Date/Time   Weight    01/29/20 0834   133 (293 6)             Body mass index is 42 13 kg/m²  Weight History:  Weight on Day of Initial Dietary Evaluation: 355 0 lbs  (BMI = 50 9)  Weight on Day of Weight Loss Surgery: 317 0 lbs  (BMI = 45 5)  Current Weight: 293 6 lbs  (BMI = 42 1)  %EWL = 34%  Height: 70 0 inches  Diagnosis: Obesity - improved    Surgery: Gastric Bypass Laparoscopic  Date of Surgery: 12/30/2019  Bariatric Surgeon: Dr Chris Lopez    Class: 5-week post-op global class    Pt reports tiredness, lethargy, nausea, "pretty bad" headaches when she wakes up in the morning, and tightness in her chest when she eats non-pureed foods  Pt reports that she is taking in the following each day:  Wakes at 6:30am  7:00am - 1/4 cup applesauce with Omeprazole  8:30am - 16 ounces of Gatorade Zero  Pt reports that she does not eat or drink while she is at work  Pt states this is because she is not allowed to at times, that she is too busy at times, and that she forgets to eat and drink   5:00pm - 1/4 cup either mashed potatoes, cream of wheat, yogurt, or mashed salmon cakes  6:00pm - less than 16 ounces water with Crystal Light  Bed at 10:30pm     Pt reports that she has not been able to go to work yet this week because she has been feeling so lethargic and nauseous  Pt states that she "blacked out" in her kitchen yesterday morning and is experiencing vision issues as well  Pt states that she does not tolerate any protein shakes that she has tried at this point  Discussed this information with BP, RN and ANAI, PADarrianC  Agreed that pt will regress her diet back to Stage 2 (full liquids), she will get 3 fluid infusions (starting tomorrow afternoon), and will continue to follow up with me   Discussed ways to increase fluid and protein throughout the day (see "Goals:" below)  Provided pt with a letter of medical necessity to allow her to eat and drink while at work throughout the day  Evaluation / Monitoring:  Eating pattern as discussed, Tolerance of nutrition prescription, Body weight, Lab values, Physical activity, Bowel pattern     Topics discussed today include:   Fluid goals post-op  Protein goals post-op  Constipation  PPI use  Diet progression  Protein supplements  Vitamin/mineral supplements and calcium supplements    Patient was able to verbalize basic diet (protein, fluid, vitamin and mineral) recommendations and possible nutrition-related complications  YES      Goals:  Go back to Stage 2 post-op diet - full liquids  Discontinue 30/60-minute rule - only focus on fluids right now  Aim for at least 70-80 ounces of fluids per day  Have the equivalent of at least 2 protein shakes per day  - Add Unjury unflavored to ALL beverages   - Try other non-milk based flavors from [employer]: green apple, cherry, lemonade (try adding to similar Crystal Light flavors)  - Try Unjury savory flavors - try chicken soup - add to water that is less than 140 degrees F  Measure EXACTLY how much fluid and how much protein powder you are taking in each day  Continue Omeprazole and Miralax daily and vitamin regimen

## 2020-01-29 ENCOUNTER — TELEPHONE (OUTPATIENT)
Dept: BARIATRICS | Facility: CLINIC | Age: 35
End: 2020-01-29

## 2020-01-29 ENCOUNTER — OFFICE VISIT (OUTPATIENT)
Dept: BARIATRICS | Facility: CLINIC | Age: 35
End: 2020-01-29

## 2020-01-29 VITALS — BODY MASS INDEX: 41.95 KG/M2 | HEIGHT: 70 IN | WEIGHT: 293 LBS

## 2020-01-29 DIAGNOSIS — Z98.84 S/P GASTRIC BYPASS: Primary | ICD-10-CM

## 2020-01-29 PROBLEM — E86.0 DEHYDRATION: Status: ACTIVE | Noted: 2020-01-29

## 2020-01-29 PROCEDURE — RECHECK

## 2020-01-29 NOTE — PROGRESS NOTES
Spoke to patient and RD today while patient was in office with RD  She is dehydrated, only drinking about 30oz/day of water and has nausea and tightness in her throat when trying to eat solids  She feels weak and dizzy when standing up  Advised her to go back on liquids/puree and push hydrating fluids to goal of at least 64oz/day -   Will place 3 outpatient IVF with M[facility] infusions and will check lytes, CBC  Advised her to contact the office if develops worsening of symptoms or fevers, chills, sweats, SOB, CP, can't tolerate fluids  She currently denies pain, fevers, chills, sweats, SOB, CP, calf pain/swelling

## 2020-01-29 NOTE — TELEPHONE ENCOUNTER
Spoke with infusion center to set up initial appt  Able to schedule pt tomorrow 1/30 at 1330  Pt made aware

## 2020-01-30 ENCOUNTER — HOSPITAL ENCOUNTER (OUTPATIENT)
Dept: INFUSION CENTER | Facility: CLINIC | Age: 35
Discharge: HOME | End: 2020-01-30
Payer: COMMERCIAL

## 2020-01-30 ENCOUNTER — OFFICE VISIT (OUTPATIENT)
Dept: BARIATRICS | Facility: CLINIC | Age: 35
End: 2020-01-30

## 2020-01-30 VITALS — WEIGHT: 293 LBS | HEIGHT: 70 IN | BODY MASS INDEX: 41.95 KG/M2

## 2020-01-30 VITALS
RESPIRATION RATE: 20 BRPM | SYSTOLIC BLOOD PRESSURE: 124 MMHG | TEMPERATURE: 97.4 F | HEART RATE: 79 BPM | DIASTOLIC BLOOD PRESSURE: 60 MMHG

## 2020-01-30 DIAGNOSIS — E86.0 DEHYDRATION: Primary | ICD-10-CM

## 2020-01-30 DIAGNOSIS — Z98.84 S/P GASTRIC BYPASS: Primary | ICD-10-CM

## 2020-01-30 LAB
ALBUMIN SERPL BCP-MCNC: 3.9 G/DL (ref 3.5–5)
ALP SERPL-CCNC: 87 U/L (ref 46–116)
ALT SERPL W P-5'-P-CCNC: 30 U/L (ref 12–78)
ANION GAP SERPL CALCULATED.3IONS-SCNC: 11 MMOL/L (ref 4–13)
AST SERPL W P-5'-P-CCNC: 38 U/L (ref 5–45)
BASOPHILS # BLD AUTO: 0.03 THOUSANDS/ΜL (ref 0–0.1)
BASOPHILS NFR BLD AUTO: 0 % (ref 0–1)
BILIRUB SERPL-MCNC: 0.6 MG/DL (ref 0.2–1)
BUN SERPL-MCNC: 18 MG/DL (ref 5–25)
CALCIUM SERPL-MCNC: 9 MG/DL (ref 8.3–10.1)
CHLORIDE SERPL-SCNC: 103 MMOL/L (ref 100–108)
CO2 SERPL-SCNC: 26 MMOL/L (ref 21–32)
CREAT SERPL-MCNC: 0.84 MG/DL (ref 0.6–1.3)
EOSINOPHIL # BLD AUTO: 0.17 THOUSAND/ΜL (ref 0–0.61)
EOSINOPHIL NFR BLD AUTO: 3 % (ref 0–6)
ERYTHROCYTE [DISTWIDTH] IN BLOOD BY AUTOMATED COUNT: 12.8 % (ref 11.6–15.1)
GFR SERPL CREATININE-BSD FRML MDRD: 91 ML/MIN/1.73SQ M
GLUCOSE SERPL-MCNC: 67 MG/DL (ref 65–140)
HCT VFR BLD AUTO: 44.8 % (ref 34.8–46.1)
HGB BLD-MCNC: 14.8 G/DL (ref 11.5–15.4)
IMM GRANULOCYTES # BLD AUTO: 0.01 THOUSAND/UL (ref 0–0.2)
IMM GRANULOCYTES NFR BLD AUTO: 0 % (ref 0–2)
LYMPHOCYTES # BLD AUTO: 3.2 THOUSANDS/ΜL (ref 0.6–4.47)
LYMPHOCYTES NFR BLD AUTO: 47 % (ref 14–44)
MAGNESIUM SERPL-MCNC: 1.9 MG/DL (ref 1.6–2.6)
MCH RBC QN AUTO: 30.1 PG (ref 26.8–34.3)
MCHC RBC AUTO-ENTMCNC: 33 G/DL (ref 31.4–37.4)
MCV RBC AUTO: 91 FL (ref 82–98)
MONOCYTES # BLD AUTO: 0.49 THOUSAND/ΜL (ref 0.17–1.22)
MONOCYTES NFR BLD AUTO: 7 % (ref 4–12)
NEUTROPHILS # BLD AUTO: 2.93 THOUSANDS/ΜL (ref 1.85–7.62)
NEUTS SEG NFR BLD AUTO: 43 % (ref 43–75)
NRBC BLD AUTO-RTO: 0 /100 WBCS
PLATELET # BLD AUTO: 234 THOUSANDS/UL (ref 149–390)
PMV BLD AUTO: 12.4 FL (ref 8.9–12.7)
POTASSIUM SERPL-SCNC: 4 MMOL/L (ref 3.5–5.3)
PROT SERPL-MCNC: 7.5 G/DL (ref 6.4–8.2)
RBC # BLD AUTO: 4.92 MILLION/UL (ref 3.81–5.12)
SODIUM SERPL-SCNC: 140 MMOL/L (ref 136–145)
WBC # BLD AUTO: 6.83 THOUSAND/UL (ref 4.31–10.16)

## 2020-01-30 PROCEDURE — 96365 THER/PROPH/DIAG IV INF INIT: CPT

## 2020-01-30 PROCEDURE — 96366 THER/PROPH/DIAG IV INF ADDON: CPT

## 2020-01-30 PROCEDURE — 80053 COMPREHEN METABOLIC PANEL: CPT | Performed by: PHYSICIAN ASSISTANT

## 2020-01-30 PROCEDURE — RECHECK

## 2020-01-30 PROCEDURE — 83735 ASSAY OF MAGNESIUM: CPT | Performed by: PHYSICIAN ASSISTANT

## 2020-01-30 PROCEDURE — 85025 COMPLETE CBC W/AUTO DIFF WBC: CPT | Performed by: PHYSICIAN ASSISTANT

## 2020-01-30 RX ADMIN — ASCORBIC ACID, VITAMIN A PALMITATE, CHOLECALCIFEROL, THIAMINE HYDROCHLORIDE, RIBOFLAVIN-5 PHOSPHATE SODIUM, PYRIDOXINE HYDROCHLORIDE, NIACINAMIDE, DEXPANTHENOL, ALPHA-TOCOPHEROL ACETATE, VITAMIN K1, FOLIC ACID, BIOTIN, CYANOCOBALAMIN: 200; 3300; 200; 6; 3.6; 6; 40; 15; 10; 150; 600; 60; 5 INJECTION, SOLUTION INTRAVENOUS at 13:56

## 2020-01-30 RX ADMIN — ASCORBIC ACID, VITAMIN A PALMITATE, CHOLECALCIFEROL, THIAMINE HYDROCHLORIDE, RIBOFLAVIN-5 PHOSPHATE SODIUM, PYRIDOXINE HYDROCHLORIDE, NIACINAMIDE, DEXPANTHENOL, ALPHA-TOCOPHEROL ACETATE, VITAMIN K1, FOLIC ACID, BIOTIN, CYANOCOBALAMIN: 200; 3300; 200; 6; 3.6; 6; 40; 15; 10; 150; 600; 60; 5 INJECTION, SOLUTION INTRAVENOUS at 15:09

## 2020-01-30 NOTE — PROGRESS NOTES
Post-op Weight Management Nutrition Follow-Up    Lisy Ariza  29 y o   female     Surgery: Gastric Bypass Laparoscopic  Date of Surgery: 12/30/2019  Bariatric Surgeon: Dr Mati Holland    Assessment:  There were no vitals filed for this visit  Weight (last 2 days)     Date/Time   Weight    01/30/20 0815   133 (293 4)             Body mass index is 42 1 kg/m²  Weight History:  Weight on Day of Initial Dietary Evaluation: 355 0 lbs  (BMI = 50 9)  Weight on Day of Weight Loss Surgery: 317 0 lbs  (BMI = 45 5)  Current Weight: 293 4 lbs  (BMI = 42 1)  %EWL = 34%  Height: 70 0 inches  Pt states that she is feeling much better today  Pt states that she was able to tolerate 52 ounces of fluids yesterday which included an 11-ounce Premier Protein shake  Pt reports that her vision is better and is not as tired  Pt states that she slept 7 5 hours last night and feels more rested today  Pt states that she did wake up this morning with another headache - pt states that she believes this is a result of her sinuses being stuffed as well       Pt has a fluid infusion today scheduled for 1:30pm     Nutrition Diagnosis:  Diagnosis 1: Obesity - improved    Diagnosis 2: Inadequate fluid and protein intake  Related to: s/p RNY gastric bypass surgery 12/30/2019  As evidenced by: pt reports drinking less than 30 ounces of fluids per day (goal is at least 64 ounces) and consuming less than 10 grams of protein per day (goal is at least 60 grams); s/s of dehydration    Intervention:  Diagnosis: Obesity - improved    Evaluation / Monitoring:  Eating pattern as discussed, Tolerance of nutrition prescription, Body weight, Lab values, Physical activity, Bowel pattern     Topics discussed today include:   Fluid goals post-op  Protein goals post-op  Constipation  PPI use  Diet progression  Protein supplements  Vitamin/mineral supplements and calcium supplements    Goals:  1/30/2020 in bold:  Go back to Stage 2 post-op diet - full liquids - continue this  Discontinue 30/60-minute rule - only focus on fluids right now - continue this  Aim for at least 70-80 ounces of fluids per day - increase as much as possible to this goal  Have the equivalent of at least 2 protein shakes per day - continue trying to add unflavored to other fluid sources to increase protein intake  - Add Unjury unflavored to ALL beverages   - Try other non-milk based flavors from [employer]: green apple, cherry, lemonade (try adding to similar Crystal Light flavors)  - Try Unjury savory flavors - try chicken soup - add to water that is less than 140 degrees F  Measure EXACTLY how much fluid and how much protein powder you are taking in each day - continue this  Continue Omeprazole and Miralax daily and vitamin regimen - continue this    Time Spent:  30 minutes

## 2020-01-31 ENCOUNTER — OFFICE VISIT (OUTPATIENT)
Dept: BARIATRICS | Facility: CLINIC | Age: 35
End: 2020-01-31

## 2020-01-31 VITALS — HEIGHT: 70 IN | BODY MASS INDEX: 41.95 KG/M2 | WEIGHT: 293 LBS

## 2020-01-31 DIAGNOSIS — Z98.84 S/P GASTRIC BYPASS: Primary | ICD-10-CM

## 2020-01-31 PROCEDURE — RECHECK

## 2020-01-31 NOTE — PROGRESS NOTES
Post-op Weight Management Nutrition Follow-Up    Susie Goode  29 y o   female     Surgery: Gastric Bypass Laparoscopic  Date of Surgery: 12/30/2019  Bariatric Surgeon: Dr Gilford Cheers    Assessment:  There were no vitals filed for this visit  Weight (last 2 days)     Date/Time   Weight    01/31/20 0830   134 (295 2)             Body mass index is 42 36 kg/m²  Weight History:  Weight on Day of Initial Dietary Evaluation: 355 0 lbs  (BMI = 50 9)  Weight on Day of Weight Loss Surgery: 317 0 lbs  (BMI = 45 5)  Current Weight: 295 2 lbs  (BMI = 42 4)  Height: 70 0 inches  Pt states that she is feeling much better today and is not experiencing nausea  Pt states that she drank about 40 ounces of fluids yesterday, which pt states that she knew was not enough  Pt states that she will push her fluids as much as she can and brought 2 protein shakes with her day  KIARA DENNIS recommended pt increase her omeprazole to 2x/day  Will reach out to pt on Monday to assess her status  Pt had a fluid infusion yesterday at 1:30pm and will call today to schedule her next infusion for Monday or Tuesday      Nutrition Diagnosis:  Diagnosis 1: Obesity - improved    Diagnosis 2: Inadequate fluid and protein intake  Related to: s/p RNY gastric bypass surgery 12/30/2019  As evidenced by: pt reports drinking less than 30 ounces of fluids per day (goal is at least 64 ounces) and consuming less than 10 grams of protein per day (goal is at least 60 grams); s/s of dehydration    Intervention:  Diagnosis: Obesity - improved    Evaluation / Monitoring:  Eating pattern as discussed, Tolerance of nutrition prescription, Body weight, Lab values, Physical activity, Bowel pattern     Topics discussed today include:   Fluid goals post-op  Protein goals post-op  Constipation  PPI use  Diet progression  Protein supplements  Vitamin/mineral supplements and calcium supplements    Goals:  1/31/2020 in bold:  Go back to Stage 2 post-op diet - full liquids - continue this  Discontinue 30/60-minute rule - only focus on fluids right now - continue this  Aim for at least 70-80 ounces of fluids per day - increase as much as possible to this goal - MINIMUM is 64 ounces  Have the equivalent of at least 2 protein shakes per day - continue trying to add unflavored to other fluid sources to increase protein intake  - Add Unjury unflavored to ALL beverages   - Try other non-milk based flavors from [employer]: green apple, cherry, lemonade (try adding to similar [employer] flavors)  - Try Unjury savory flavors - try chicken soup - add to water that is less than 140 degrees F  Measure EXACTLY how much fluid and how much protein powder you are taking in each day - continue this  Continue Omeprazole and Miralax daily and vitamin regimen - continue this and increase omeprazole to 2x/day    Time Spent:  30 minutes

## 2020-02-03 ENCOUNTER — TELEPHONE (OUTPATIENT)
Dept: BARIATRICS | Facility: CLINIC | Age: 35
End: 2020-02-03

## 2020-02-03 NOTE — TELEPHONE ENCOUNTER
I left a brief message on patient's voicemail stating her labs were all within normal limits and she should call me back with any questions or if she'd like to discuss specific numbers  ----- Message from Carey Beltrán PA-C sent at 2/3/2020 11:03 AM EST -----  Please let the patient know that her labs look great and electrolytes are WNL   Thanks

## 2020-02-04 ENCOUNTER — HOSPITAL ENCOUNTER (OUTPATIENT)
Dept: INFUSION CENTER | Facility: CLINIC | Age: 35
Discharge: HOME | End: 2020-02-04
Payer: COMMERCIAL

## 2020-02-04 VITALS
HEART RATE: 85 BPM | RESPIRATION RATE: 18 BRPM | DIASTOLIC BLOOD PRESSURE: 67 MMHG | TEMPERATURE: 97.1 F | SYSTOLIC BLOOD PRESSURE: 145 MMHG

## 2020-02-04 DIAGNOSIS — E86.0 DEHYDRATION: Primary | ICD-10-CM

## 2020-02-04 PROCEDURE — 96366 THER/PROPH/DIAG IV INF ADDON: CPT

## 2020-02-04 PROCEDURE — 96365 THER/PROPH/DIAG IV INF INIT: CPT

## 2020-02-04 RX ADMIN — ASCORBIC ACID, VITAMIN A PALMITATE, CHOLECALCIFEROL, THIAMINE HYDROCHLORIDE, RIBOFLAVIN-5 PHOSPHATE SODIUM, PYRIDOXINE HYDROCHLORIDE, NIACINAMIDE, DEXPANTHENOL, ALPHA-TOCOPHEROL ACETATE, VITAMIN K1, FOLIC ACID, BIOTIN, CYANOCOBALAMIN: 200; 3300; 200; 6; 3.6; 6; 40; 15; 10; 150; 600; 60; 5 INJECTION, SOLUTION INTRAVENOUS at 13:35

## 2020-02-04 RX ADMIN — ASCORBIC ACID, VITAMIN A PALMITATE, CHOLECALCIFEROL, THIAMINE HYDROCHLORIDE, RIBOFLAVIN-5 PHOSPHATE SODIUM, PYRIDOXINE HYDROCHLORIDE, NIACINAMIDE, DEXPANTHENOL, ALPHA-TOCOPHEROL ACETATE, VITAMIN K1, FOLIC ACID, BIOTIN, CYANOCOBALAMIN: 200; 3300; 200; 6; 3.6; 6; 40; 15; 10; 150; 600; 60; 5 INJECTION, SOLUTION INTRAVENOUS at 14:38

## 2020-02-04 NOTE — PLAN OF CARE
Problem: Potential for Falls  Goal: Patient will remain free of falls  Description  INTERVENTIONS:  - Assess patient frequently for physical needs  -  Identify cognitive and physical deficits and behaviors that affect risk of falls  -  [place] fall precautions as indicated by assessment   - Educate patient/family on patient safety including physical limitations  - Instruct patient to call for assistance with activity based on assessment  - Modify environment to reduce risk of injury  - Consider OT/PT consult to assist with strengthening/mobility  Outcome: Progressing     Problem: Knowledge Deficit  Goal: Patient/family/caregiver demonstrates understanding of disease process, treatment plan, medications, and discharge instructions  Description  Complete learning assessment and assess knowledge base    Interventions:  - Provide teaching at level of understanding  - Provide teaching via preferred learning methods  Outcome: Progressing

## 2020-02-06 ENCOUNTER — HOSPITAL ENCOUNTER (OUTPATIENT)
Dept: INFUSION CENTER | Facility: CLINIC | Age: 35
Discharge: HOME | End: 2020-02-06
Payer: COMMERCIAL

## 2020-02-06 VITALS
HEART RATE: 80 BPM | SYSTOLIC BLOOD PRESSURE: 133 MMHG | TEMPERATURE: 97.4 F | DIASTOLIC BLOOD PRESSURE: 82 MMHG | RESPIRATION RATE: 20 BRPM

## 2020-02-06 DIAGNOSIS — E86.0 DEHYDRATION: Primary | ICD-10-CM

## 2020-02-06 PROCEDURE — 96366 THER/PROPH/DIAG IV INF ADDON: CPT

## 2020-02-06 PROCEDURE — 96365 THER/PROPH/DIAG IV INF INIT: CPT

## 2020-02-06 RX ADMIN — ASCORBIC ACID, VITAMIN A PALMITATE, CHOLECALCIFEROL, THIAMINE HYDROCHLORIDE, RIBOFLAVIN-5 PHOSPHATE SODIUM, PYRIDOXINE HYDROCHLORIDE, NIACINAMIDE, DEXPANTHENOL, ALPHA-TOCOPHEROL ACETATE, VITAMIN K1, FOLIC ACID, BIOTIN, CYANOCOBALAMIN: 200; 3300; 200; 6; 3.6; 6; 40; 15; 10; 150; 600; 60; 5 INJECTION, SOLUTION INTRAVENOUS at 15:16

## 2020-02-06 RX ADMIN — ASCORBIC ACID, VITAMIN A PALMITATE, CHOLECALCIFEROL, THIAMINE HYDROCHLORIDE, RIBOFLAVIN-5 PHOSPHATE SODIUM, PYRIDOXINE HYDROCHLORIDE, NIACINAMIDE, DEXPANTHENOL, ALPHA-TOCOPHEROL ACETATE, VITAMIN K1, FOLIC ACID, BIOTIN, CYANOCOBALAMIN: 200; 3300; 200; 6; 3.6; 6; 40; 15; 10; 150; 600; 60; 5 INJECTION, SOLUTION INTRAVENOUS at 14:15

## 2020-02-06 NOTE — PROGRESS NOTES
Pt presents for IV fluids  Pt offers no complaints, resting comfortably in chair  Vitals stable  Call bell within reach, will continue to monitor

## 2020-02-10 NOTE — PROGRESS NOTES
Post-op Weight Management Nutrition Class    Nieves Hanna  29 y o   female    There were no vitals filed for this visit  Weight (last 2 days)     Date/Time   Weight    02/12/20 1450   132 (291 2)             Body mass index is 41 78 kg/m²  Weight History:  Weight on Day of Initial Dietary Evaluation: 355 0 lbs  (BMI = 50 9)  Weight on Day of Weight Loss Surgery: 317 0 lbs  (BMI = 45 5)  Current Weight: 291 2 lbs  (BMI = 41 8)  %EWL = 35%  Height: 70 0 inches  Diagnosis: Obesity - improved    Surgery: Gastric Bypass Laparoscopic  Date of Surgery: 12/30/2019  Bariatric Surgeon: Dr Anahi Lucas    Class: 5-week post-op global class    Evaluation / Monitoring:  Eating pattern as discussed, Tolerance of nutrition prescription, Body weight, Lab values, Physical activity, Bowel pattern     Topics discussed today include:   Fluid goals post-op  Protein goals post-op  Constipation  Chew food well  Exercise  Avoidance of alcohol  PPI use  Diet progression  Dumping syndrome  Protein supplements  Vitamin/mineral supplements and calcium supplements    Patient was able to verbalize basic diet (protein, fluid, vitamin and mineral) recommendations and possible nutrition-related complications  YES

## 2020-02-12 ENCOUNTER — CLINICAL SUPPORT (OUTPATIENT)
Dept: BARIATRICS | Facility: CLINIC | Age: 35
End: 2020-02-12

## 2020-02-12 VITALS — WEIGHT: 291.2 LBS | BODY MASS INDEX: 41.69 KG/M2 | HEIGHT: 70 IN

## 2020-02-12 DIAGNOSIS — Z98.84 S/P GASTRIC BYPASS: Primary | ICD-10-CM

## 2020-02-12 PROCEDURE — RECHECK

## 2020-02-13 ENCOUNTER — TELEPHONE (OUTPATIENT)
Dept: BARIATRICS | Facility: CLINIC | Age: 35
End: 2020-02-13

## 2020-02-13 NOTE — TELEPHONE ENCOUNTER
Called patient because she notified the RD about feeling dizzy last night after work  She has been consistently drinking 64oz fluids daily and 2 protein shakes  She denies dysphagia, nausea, vomiting, or pain, SOB, CP  She is fearful of advancing her diet and trying some soft foods  I advised her that she is not getting enough kcals during the day and this is likely causing her to be dizzy at night  She needs to include more puree and slowly soft foods into her diet  She will reach out to the RD prn for additional support

## 2020-02-14 NOTE — TELEPHONE ENCOUNTER
Left message inquiring about patient's progress  Advised her to start slowly advancing her diet and notify me immediately if any issues  We may need to consider EGD for possible stricture if she has dysphagia with soft foods

## 2020-02-17 ENCOUNTER — TELEPHONE (OUTPATIENT)
Dept: OTHER | Facility: HOSPITAL | Age: 35
End: 2020-02-17

## 2020-02-17 ENCOUNTER — PREP FOR PROCEDURE (OUTPATIENT)
Dept: BARIATRICS | Facility: CLINIC | Age: 35
End: 2020-02-17

## 2020-02-17 DIAGNOSIS — Z98.84 S/P GASTRIC BYPASS: ICD-10-CM

## 2020-02-17 DIAGNOSIS — E66.01 MORBID OBESITY (HCC): Primary | ICD-10-CM

## 2020-02-17 DIAGNOSIS — R13.10 DYSPHAGIA, UNSPECIFIED TYPE: Primary | ICD-10-CM

## 2020-02-17 RX ORDER — SUCRALFATE 1 G/1
1 TABLET ORAL 4 TIMES DAILY
Qty: 120 TABLET | Refills: 2 | Status: SHIPPED | OUTPATIENT
Start: 2020-02-17 | End: 2020-04-14 | Stop reason: ALTCHOICE

## 2020-02-17 RX ORDER — PANTOPRAZOLE SODIUM 40 MG/1
40 TABLET, DELAYED RELEASE ORAL 2 TIMES DAILY
Qty: 60 TABLET | Refills: 2 | Status: SHIPPED | OUTPATIENT
Start: 2020-02-17 | End: 2020-04-14

## 2020-02-17 NOTE — TELEPHONE ENCOUNTER
Patient reports trying shrimp and scallops over the weekend and she "mostly tolerated" it but noted it felt slightly tight a few moments after swallowing  She felt very nauseated but denies vomiting  She is tolerating puree and liquids without issues  She is afraid to try any other soft foods d/t dysphagia/tight feeling in her chest  Advised her that we will start PPI 40mg BID and carafate QID and will schedule her for EGD this Friday  Continue with puree and liquids  She is staying well hydrated and drinks about 80oz/day of hydrating fluids   Denies fevers, chills, sweats, SOB, CP

## 2020-02-19 ENCOUNTER — TELEPHONE (OUTPATIENT)
Dept: BARIATRICS | Facility: CLINIC | Age: 35
End: 2020-02-19

## 2020-02-19 NOTE — TELEPHONE ENCOUNTER
Spoke to the patient regarding her questions about PPI and carafate  Advised her to do the best she can with spacing out the meds and vitamins, and if she cannot get in all the carafate doses that is ok for now - especially important to avoid carafate with PPI and antidepresants  She has EGD scheduled for this Friday and if things are looking great we may be able to decrease some medications  She also reports constipation/hard stool despite taking mirilax, 2 colace, and Linzess every other day  Advised her to try and increase her fluids (getting about 64oz/day now) and try soaked prune  Can consider mag citrate natural calm if that does not work  Asked her to keep me informed

## 2020-02-20 NOTE — PRE-PROCEDURE INSTRUCTIONS
Pre-Surgery Instructions:   Medication Instructions    acyclovir (ZOVIRAX) 400 MG tablet Patient was instructed by Physician and understands   baclofen 20 mg tablet Patient was instructed by Physician and understands   buPROPion (WELLBUTRIN) 75 mg tablet Patient was instructed by Physician and understands   calcium citrate-vitamin d (Calcium Citrate Chewy Bite) 500-500 MG-UNIT CHEW chewable tablet Patient was instructed by Physician and understands   docusate sodium (COLACE) 100 mg capsule Patient was instructed by Physician and understands   escitalopram (LEXAPRO) 10 mg tablet Patient was instructed by Physician and understands   hydrocortisone (ANUSOL-HC, PROCTOSOL HC,) 2 5 % rectal cream Patient was instructed by Physician and understands   linaCLOtide (LINZESS) 145 MCG CAPS Patient was instructed by Physician and understands   MIRENA, 52 MG, 20 MCG/24HR IUD Patient was instructed by Physician and understands   Multiple Vitamin (MULTI VITAMIN PO) Patient was instructed by Physician and understands   pantoprazole (PROTONIX) 40 mg tablet Patient was instructed by Physician and understands   sucralfate (CARAFATE) 1 g tablet Patient was instructed by Physician and understands  Pre procedure instructions reviewed with pt via phone  No meds a m of procedure   zo Orellana Gerrymaribel Axel [phone]  My Surgical Experience    The following information was developed to assist you to prepare for your operation  What do I need to do before coming to the hospital?   Arrange for a responsible person to drive you to and from the hospital    Arrange care for your children at home  Children are not allowed in the recovery areas of the hospital   Plan to wear clothing that is easy to put on and take off   If you are having shoulder surgery, wear a shirt that buttons or zippers in the front  Bathing  o Shower the evening before and the morning of your surgery with an antibacterial soap  Please refer to the Pre Op Showering Instructions for Surgery Patients Sheet   o Remove nail polish and all body piercing jewelry  o Do not shave any body part for at least 24 hours before surgery-this includes face, arms, legs and upper body  Food  o Nothing to eat or drink after midnight the night before your surgery  This includes candy and chewing gum  o Exception: If your surgery is after 12:00pm (noon), you may have clear liquids such as 7-Up®, ginger ale, apple or cranberry juice, Jell-O®, water, or clear broth until 8:00 am  o Do not drink milk or juice with pulp on the morning before surgery  o Do not drink alcohol 24 hours before surgery  Medicine  o Follow instructions you received from your surgeon about which medicines you may take on the day of surgery  o If instructed to take medicine on the morning of surgery, take pills with just a small sip of water  Call your prescribing doctor for specific information on what to do if you take insulin    What should I bring to the hospital?    Bring:  Carlos Pushpa or a walker, if you have them, for foot or knee surgery   A list of the daily medicines, vitamins, minerals, herbals and nutritional supplements you take  Include the dosages of medicines and the time you take them each day   Glasses, dentures or hearing aids   Minimal clothing; you will be wearing hospital sleepwear   Photo ID; required to verify your identity   If you have a Living Will or Power of , bring a copy of the documents   If you have an ostomy, bring an extra pouch and any supplies you use    Do not bring   Medicines or inhalers   Money, valuables or jewelry    What other information should I know about the day of surgery?  Notify your surgeons if you develop a cold, sore throat, cough, fever, rash or any other illness     Report to the Ambulatory Surgical/Same Day Surgery Unit   You will be instructed to stop at Registration only if you have not been pre-registered   Inform your  fi they do not stay that they will be asked by the staff to leave a phone number where they can be reached   Be available to be reached before surgery  In the event the operating room schedule changes, you may be asked to come in earlier or later than expected    *It is important to tell your doctor and others involved in your health care if you are taking or have been taking any non-prescription drugs, vitamins, minerals, herbals or other nutritional supplements   Any of these may interact with some food or medicines and cause a reaction

## 2020-02-21 ENCOUNTER — ANESTHESIA EVENT (OUTPATIENT)
Dept: GASTROENTEROLOGY | Facility: AMBULARY SURGERY CENTER | Age: 35
End: 2020-02-21

## 2020-02-21 ENCOUNTER — ANESTHESIA (OUTPATIENT)
Dept: GASTROENTEROLOGY | Facility: AMBULARY SURGERY CENTER | Age: 35
End: 2020-02-21

## 2020-02-21 ENCOUNTER — HOSPITAL ENCOUNTER (OUTPATIENT)
Dept: GASTROENTEROLOGY | Facility: AMBULARY SURGERY CENTER | Age: 35
Setting detail: OUTPATIENT SURGERY
Discharge: HOME | End: 2020-02-21
Attending: SURGERY | Admitting: SURGERY
Payer: COMMERCIAL

## 2020-02-21 VITALS
RESPIRATION RATE: 16 BRPM | BODY MASS INDEX: 40.16 KG/M2 | HEIGHT: 70 IN | HEART RATE: 67 BPM | WEIGHT: 280.5 LBS | TEMPERATURE: 96.9 F | OXYGEN SATURATION: 100 % | SYSTOLIC BLOOD PRESSURE: 149 MMHG | DIASTOLIC BLOOD PRESSURE: 90 MMHG

## 2020-02-21 DIAGNOSIS — E66.01 MORBID OBESITY (HCC): ICD-10-CM

## 2020-02-21 LAB
EXT PREGNANCY TEST URINE: NEGATIVE
EXT. CONTROL: NORMAL

## 2020-02-21 PROCEDURE — 81025 URINE PREGNANCY TEST: CPT | Performed by: SURGERY

## 2020-02-21 PROCEDURE — C9113 INJ PANTOPRAZOLE SODIUM, VIA: HCPCS | Performed by: SURGERY

## 2020-02-21 PROCEDURE — 43245 EGD DILATE STRICTURE: CPT | Performed by: SURGERY

## 2020-02-21 PROCEDURE — C1726 CATH, BAL DIL, NON-VASCULAR: HCPCS

## 2020-02-21 RX ORDER — SODIUM CHLORIDE, SODIUM LACTATE, POTASSIUM CHLORIDE, CALCIUM CHLORIDE 600; 310; 30; 20 MG/100ML; MG/100ML; MG/100ML; MG/100ML
125 INJECTION, SOLUTION INTRAVENOUS CONTINUOUS
Status: DISCONTINUED | OUTPATIENT
Start: 2020-02-21 | End: 2020-02-25 | Stop reason: HOSPADM

## 2020-02-21 RX ORDER — LIDOCAINE HYDROCHLORIDE 10 MG/ML
INJECTION, SOLUTION EPIDURAL; INFILTRATION; INTRACAUDAL; PERINEURAL AS NEEDED
Status: DISCONTINUED | OUTPATIENT
Start: 2020-02-21 | End: 2020-02-21 | Stop reason: SURG

## 2020-02-21 RX ORDER — PANTOPRAZOLE SODIUM 40 MG/1
40 INJECTION, POWDER, FOR SOLUTION INTRAVENOUS ONCE
Status: COMPLETED | OUTPATIENT
Start: 2020-02-21 | End: 2020-02-21

## 2020-02-21 RX ORDER — PROPOFOL 10 MG/ML
INJECTION, EMULSION INTRAVENOUS AS NEEDED
Status: DISCONTINUED | OUTPATIENT
Start: 2020-02-21 | End: 2020-02-21 | Stop reason: SURG

## 2020-02-21 RX ADMIN — SODIUM CHLORIDE, SODIUM LACTATE, POTASSIUM CHLORIDE, AND CALCIUM CHLORIDE 125 ML/HR: .6; .31; .03; .02 INJECTION, SOLUTION INTRAVENOUS at 10:26

## 2020-02-21 RX ADMIN — PROPOFOL 30 MG: 10 INJECTION, EMULSION INTRAVENOUS at 10:50

## 2020-02-21 RX ADMIN — PROPOFOL 70 MG: 10 INJECTION, EMULSION INTRAVENOUS at 10:35

## 2020-02-21 RX ADMIN — PROPOFOL 30 MG: 10 INJECTION, EMULSION INTRAVENOUS at 10:38

## 2020-02-21 RX ADMIN — PANTOPRAZOLE SODIUM 40 MG: 40 INJECTION, POWDER, FOR SOLUTION INTRAVENOUS at 11:28

## 2020-02-21 RX ADMIN — PROPOFOL 50 MG: 10 INJECTION, EMULSION INTRAVENOUS at 10:42

## 2020-02-21 RX ADMIN — PROPOFOL 50 MG: 10 INJECTION, EMULSION INTRAVENOUS at 10:36

## 2020-02-21 RX ADMIN — PROPOFOL 40 MG: 10 INJECTION, EMULSION INTRAVENOUS at 10:46

## 2020-02-21 RX ADMIN — LIDOCAINE HYDROCHLORIDE 50 MG: 10 INJECTION, SOLUTION EPIDURAL; INFILTRATION; INTRACAUDAL; PERINEURAL at 10:34

## 2020-02-21 NOTE — H&P
This is a 29 y o  female with a history of morbid obesity and Body mass index is 40 25 kg/m²  She is status post lloyd-en-y gastric bypass 12/30/2019 with an inability to advance past pureed/blenderized diet  Here for an EGD to evaluate the anatomy of the GI tract and to rule out the presence of a stricture  Physical Exam    Ht 5' 10" (1 778 m)   Wt 127 kg (280 lb 8 oz)   LMP 02/12/2020 (Approximate)   BMI 40 25 kg/m²    AAOx3  RRR  CTA B  Abdomen Benign  A/P:    This is a 29 y o  female with a history of morbid obesity and Body mass index is 40 25 kg/m²  s/p LRYGB here to rule out the presence of a stricture  Will proceed with the EGD and biopsies        Mary Villalta MD  2/21/2020  10:18 AM

## 2020-02-21 NOTE — ANESTHESIA PREPROCEDURE EVALUATION
Review of Systems/Medical History  Patient summary reviewed  Chart reviewed  No history of anesthetic complications     Cardiovascular  Hypertension ,    Pulmonary  Smoker ex-smoker  , Sleep apnea CPAP,        GI/Hepatic    GERD ,             Endo/Other    Obesity  morbid obesity   GYN       Hematology   Musculoskeletal    Arthritis     Neurology   Psychology   Anxiety, Depression ,              Physical Exam    Airway    Mallampati score: II  TM Distance: >3 FB  Neck ROM: full     Dental   No notable dental hx     Cardiovascular  Cardiovascular exam normal    Pulmonary  Pulmonary exam normal     Other Findings        Anesthesia Plan  ASA Score- 3     Anesthesia Type- IV sedation with anesthesia with ASA Monitors  Additional Monitors:   Airway Plan:         Plan Factors-    Induction-     Postoperative Plan-     Informed Consent- Anesthetic plan and risks discussed with patient  I personally reviewed this patient with the CRNA  Discussed and agreed on the Anesthesia Plan with the CRNA  Ryile Krishna

## 2020-02-21 NOTE — ANESTHESIA POSTPROCEDURE EVALUATION
Post-Op Assessment Note    CV Status:  Stable  Pain Score: 0    Pain management: adequate     Mental Status:  Awake   Hydration Status:  Stable   PONV Controlled:  None   Airway Patency:  Patent   Post Op Vitals Reviewed: Yes      Staff: CRNA   Comments: spontaneously breathing, HOB @ 30 degrees, vss, fully endorsed to recovery w/o AC          BP   131/67   Temp     Pulse  74   Resp   14   SpO2   100

## 2020-02-25 ENCOUNTER — TELEPHONE (OUTPATIENT)
Dept: BARIATRICS | Facility: CLINIC | Age: 35
End: 2020-02-25

## 2020-02-25 DIAGNOSIS — Z98.84 S/P GASTRIC BYPASS: Primary | ICD-10-CM

## 2020-02-25 DIAGNOSIS — K91.2 POSTSURGICAL MALABSORPTION: ICD-10-CM

## 2020-02-25 NOTE — TELEPHONE ENCOUNTER
Left patient message to return my call  Inquiring about progress  She had EGD with dilation on Friday for mild stricture  Reminded her to continue with PPI BID and carafate QID and push PO fluids to goal of at least 64oz/day  Addendum: patient returned my call and is feeling much better s/p dilation  She reports eating soft meatball both Saturday and Sunday and denies any dysphagia, N/V, pressure when swallowing  She is tolerating adequate fluids and protein  Has a head cold but otherwise feeling much improved  Advised her to keep me updated if has issues

## 2020-03-06 ENCOUNTER — TELEPHONE (OUTPATIENT)
Dept: BARIATRICS | Facility: CLINIC | Age: 35
End: 2020-03-06

## 2020-03-06 NOTE — TELEPHONE ENCOUNTER
Please inform her that we will wait until patient has her first post op labs that will include her B12 levels and I will direct the patient from there  Thanks  I spoke to the patient and she understands that we will wait on B12 injections until we get her lab results

## 2020-03-06 NOTE — TELEPHONE ENCOUNTER
Elizabeth Lan from [facility] office gave a call regarding protocol for b12 injections  Please give her a call  She was also asking how often we test and if we are going to be following her labs

## 2020-03-13 ENCOUNTER — TELEPHONE (OUTPATIENT)
Dept: BARIATRICS | Facility: CLINIC | Age: 35
End: 2020-03-13

## 2020-03-13 NOTE — TELEPHONE ENCOUNTER
Called patient to inquire about progress  She reports feeling much better, denies dysphagia, vomiting, pain, or other issues  She did have some nausea when she tried to eat eggs one morning - no vomiting and nausea quickly resolved  Diet Recall:   B - banana and crackers  L - yogurt and cheese  D - Thai  [place] ([facility]) meatballs or pork and vegetables (steamed broccoli, cabbage, or spaghetti squash)  Snacks - crackers, cheese sticks, hummus    Fluids - 48oz GO, almond milk    Advised her to increase protein and fluids  Avoid banana and crackers for breakfast and choose protein shake for breakfast or mid-morning snack and protein food like fish, or meatball, or oatmeal with protein powder, etc for breakfast/snack  Advised her to increase fluids to goal of at least 64oz/day and include protein clear or milk based shakes at least once/day and continue PPI BID  May tolerate eggs at night, but avoid if continues to cause nausea and always eat very slowly!

## 2020-03-16 ENCOUNTER — TELEPHONE (OUTPATIENT)
Dept: BARIATRICS | Facility: CLINIC | Age: 35
End: 2020-03-16

## 2020-03-16 NOTE — TELEPHONE ENCOUNTER
Patient called just to update PA on her progress over the weekend  On Saturday Max Lock ate a portabello mushroom stuffed with ground turkey and cheese  She ate it very slowly and chewed thoroughly  She became nauseous and vomited (very foamy appearance)  She experienced no other symptoms and has eaten soft foods with no issues today  yvette Drummond

## 2020-03-18 ENCOUNTER — OFFICE VISIT (OUTPATIENT)
Dept: BARIATRICS | Facility: CLINIC | Age: 35
End: 2020-03-18

## 2020-03-18 VITALS — HEIGHT: 70 IN | BODY MASS INDEX: 38.85 KG/M2 | WEIGHT: 271.4 LBS

## 2020-03-18 DIAGNOSIS — Z98.84 S/P GASTRIC BYPASS: Primary | ICD-10-CM

## 2020-03-18 DIAGNOSIS — K59.00 CONSTIPATION, UNSPECIFIED CONSTIPATION TYPE: ICD-10-CM

## 2020-03-18 PROCEDURE — RECHECK

## 2020-03-18 NOTE — TELEPHONE ENCOUNTER
Attempted to contact patient without success  Spoke to the RD - she saw her today and reports doing very well except she has been experiencing frequent skin rashes under abdominal skin folds  Will advise her to keep the area clean and dry and use creams/lotions/powder prn  Call PCP and Derm for worsening rashes

## 2020-03-18 NOTE — PROGRESS NOTES
Bariatric Follow Up Nutrition Note    Post-op    Type of surgery  Gastric bypass: laparoscopic  Surgery Date: 12/30/2019  Surgeon: Dr Kelli Castañeda  29 y o   female    There were no vitals filed for this visit  Weight (last 2 days)     Date/Time   Weight    03/18/20 0827   123 (271 4)            Body mass index is 38 94 kg/m²  Height: 70 inches    Weight History:  Weight on Day of Initial Dietary Evaluation: 355 0 lbs  (BMI = 50 9)  Weight on Day of Metabolic and Bariatric Surgery: 317 0 lbs  (BMI = 45 5)  Current Weight: 271 4 lbs  (BMI = 38 9)  %EWL = 46%  Hi: today    Pt reports that she is feeling good and tolerating a lot of foods at this time  Pt states that she is concerned about a stricture again and is appreciative of our office following up with her      Review of History and Medications   Past Medical History:   Diagnosis Date    Abnormal Pap smear of cervix     Anxiety     ASCUS with positive high risk HPV cervical 11/6/2018    Back pain     CPAP (continuous positive airway pressure) dependence     Depressed     Femur fracture (HCC)     GERD (gastroesophageal reflux disease)     HPV (human papilloma virus) infection     Hypertension     Hx post surgery stable    Irritable bowel syndrome     Morbid obesity with BMI of 50 0-59 9, adult ([address] )     Obesity     Postgastrectomy malabsorption     Pre-diabetes     Prediabetes 10/8/2018    Overview:  Per Prediabetes protocol #1    Sleep apnea      Past Surgical History:   Procedure Laterality Date    ABDOMINAL SURGERY      Gastric Bypass, December 2019    COLPOSCOPY  9/2018    EGD      FASCIOTOMY Left     left calf    FEMUR FRACTURE SURGERY Right     MD LAP GASTRIC BYPASS/ROWAN-EN-Y N/A 12/30/2019    Procedure: BYPASS GASTRIC  ROWAN-EN-Y LAPAROSCOPIC WITH INTRAOPERATIVE EGD;  Surgeon: Gilford Cheers, MD;  Location: [facility];  Service: Bariatrics    WISDOM TOOTH EXTRACTION Social History     Socioeconomic History    Marital status: Single     Spouse name: Not on file    Number of children: Not on file    Years of education: Not on file    Highest education level: Not on file   Occupational History    Not on file   Social Needs    Financial resource strain: Not on file    Food insecurity:     Worry: Not on file     Inability: Not on file    Transportation needs:     Medical: Not on file     Non-medical: Not on file   Tobacco Use    Smoking status: Former Smoker     Packs/day: 0 00     Years: 0 00     Pack years: 0 00     Types: Cigarettes     Last attempt to quit: 2017     Years since quitting: 3 2    Smokeless tobacco: Never Used    Tobacco comment: 0 75 ppd for 2-3 years;  History of vaping    Substance and Sexual Activity    Alcohol use: Not Currently    Drug use: No    Sexual activity: Yes     Partners: Male     Birth control/protection: IUD     Comment: mirena inserted 9/25/18   Lifestyle    Physical activity:     Days per week: Not on file     Minutes per session: Not on file    Stress: Not on file   Relationships    Social connections:     Talks on phone: Not on file     Gets together: Not on file     Attends Christianity service: Not on file     Active member of club or organization: Not on file     Attends meetings of clubs or organizations: Not on file     Relationship status: Not on file    Intimate partner violence:     Fear of current or ex partner: Not on file     Emotionally abused: Not on file     Physically abused: Not on file     Forced sexual activity: Not on file   Other Topics Concern    Not on file   Social History Narrative    Lives with boyfriend and his kids (part time)     Works at [employer]        Current Outpatient Medications:     acyclovir (ZOVIRAX) 400 MG tablet, as needed , Disp: , Rfl:     baclofen 20 mg tablet, as needed , Disp: , Rfl:     buPROPion (WELLBUTRIN) 75 mg tablet, 150 mg 2 (two) times a day , Disp: , Rfl:     calcium citrate-vitamin d (Calcium Citrate Chewy Bite) 500-500 MG-UNIT CHEW chewable tablet, Chew 1 tablet 3 (three) times a day, Disp: , Rfl:     docusate sodium (COLACE) 100 mg capsule, Take 100 mg by mouth 2 (two) times a day, Disp: , Rfl:     escitalopram (LEXAPRO) 10 mg tablet, daily , Disp: , Rfl:     hydrocortisone (ANUSOL-HC, PROCTOSOL HC,) 2 5 % rectal cream, Insert into the rectum 2 (two) times a day (Patient taking differently: Insert into the rectum as needed ), Disp: 30 g, Rfl: 0    linaCLOtide (LINZESS) 145 MCG CAPS, Take 1 capsule (145 mcg total) by mouth daily (Patient taking differently: Take 145 mcg by mouth every other day ), Disp: 90 capsule, Rfl: 3    MIRENA, 52 MG, 20 MCG/24HR IUD, TO BE INSERTED ONE TIME BY PRESCRIBER   ROUTE INTRAUTERINE , Disp: , Rfl: 0    Multiple Vitamin (MULTI VITAMIN PO), Take by mouth daily, Disp: , Rfl:     pantoprazole (PROTONIX) 40 mg tablet, Take 1 tablet (40 mg total) by mouth 2 (two) times a day, Disp: 60 tablet, Rfl: 2    sucralfate (CARAFATE) 1 g tablet, Take 1 tablet (1 g total) by mouth 4 (four) times a day, Disp: 120 tablet, Rfl: 2    Food Intake and Lifestyle Assessment:  Pt is following 30/60-minute rule - yes  Pt is taking required post-operative vitamins and minerals - Pt is following up with KIARA DENNIS regarding post-op MVI status; pt states she is taking Ourity MVI and chewable calcium citrate 3x500 mg (in separate doses throughout the day)  Pt is getting at least 64 oz of sugar-free, caffeine-free, noncarbonated fluids daily - yes - pt states that she is drinking about 48 ounces of SF beverages plus a protein shake  Pt is exercising - yes - pt states that she has been walking for exercise at home while 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    Food Intake Assessment completed via usual diet recall:  Breakfast: JBN shake with almond milk  Snack: string cheese   Lunch: yogurt  Snack: string cheese  Dinner: small piece of salmon  Snack: none; sometimes crackers with hummus or piece of cheese    Protein supplement: 1 JBN shake per day   Estimated protein intake per day: at least 60 ounces  Meals eaten away from home: less than once per week  Typical meal pattern: 3 meals per day and 2-3 snacks per day  Eating Behaviors: Appropriate diet advancement, Appropriate portion sizes and Does not drink with meals and waits 30-minutes after meal before resuming drinking  Cultural or Samaritan considerations: n/a    Nutrition Diagnosis  Diagnosis: Overweight / Obesity (NC-3 3) - improved     Interventions and Teaching   Patient educated on post-op nutrition guidelines  Patient educated and handouts provided    Capacity of post-surgery stomach  Adequate hydration  Weight loss plateaus/possibility of weight regain  Exercise  Nutrition considerations after surgery  Protein supplements  Meal planning and preparation  Appropriate carbohydrate, protein, and fat intake, and food/fluid choices to maximize safe weight loss, nutrient intake, and tolerance   Dietary and lifestyle changes  Possible problems with poor eating habits  Intuitive eating  Techniques for self monitoring and keeping daily food journal  Vitamin / Mineral supplementation     Education provided to: patient  Barriers to learning: No barriers identified  Readiness to change: Action:  (Changing behavior)  Comprehension: demonstrated understanding   Expected Compliance: good     Evaluation / Monitoring:  Eating pattern as discussed, Tolerance of nutrition prescription, Body weight, Lab values, Physical activity     Goals:  Pt is to work on eating slower  Pt is to prioritize 30/60-minute rule  Pt is to continue to find ways to increase exercise at home     Time Spent:   30 Minutes

## 2020-03-30 ENCOUNTER — TELEPHONE (OUTPATIENT)
Dept: BARIATRICS | Facility: CLINIC | Age: 35
End: 2020-03-30

## 2020-03-30 ENCOUNTER — APPOINTMENT (OUTPATIENT)
Dept: LAB | Facility: HOSPITAL | Age: 35
End: 2020-03-30
Payer: COMMERCIAL

## 2020-03-30 DIAGNOSIS — Z98.84 S/P GASTRIC BYPASS: ICD-10-CM

## 2020-03-30 DIAGNOSIS — K91.2 POSTSURGICAL MALABSORPTION: ICD-10-CM

## 2020-03-30 DIAGNOSIS — E87.6 HYPOKALEMIA: Primary | ICD-10-CM

## 2020-03-30 LAB
25(OH)D3 SERPL-MCNC: 47.9 NG/ML (ref 30–100)
ALBUMIN SERPL BCP-MCNC: 3.6 G/DL (ref 3.5–5)
ALP SERPL-CCNC: 96 U/L (ref 46–116)
ALT SERPL W P-5'-P-CCNC: 46 U/L (ref 12–78)
ANION GAP SERPL CALCULATED.3IONS-SCNC: 11 MMOL/L (ref 4–13)
AST SERPL W P-5'-P-CCNC: 18 U/L (ref 5–45)
BASOPHILS # BLD AUTO: 0.03 THOUSANDS/ΜL (ref 0–0.1)
BASOPHILS NFR BLD AUTO: 0 % (ref 0–1)
BILIRUB SERPL-MCNC: 0.5 MG/DL (ref 0.2–1)
BUN SERPL-MCNC: 18 MG/DL (ref 5–25)
CALCIUM SERPL-MCNC: 9.2 MG/DL (ref 8.3–10.1)
CHLORIDE SERPL-SCNC: 105 MMOL/L (ref 100–108)
CO2 SERPL-SCNC: 24 MMOL/L (ref 21–32)
CREAT SERPL-MCNC: 0.86 MG/DL (ref 0.6–1.3)
EOSINOPHIL # BLD AUTO: 0.15 THOUSAND/ΜL (ref 0–0.61)
EOSINOPHIL NFR BLD AUTO: 2 % (ref 0–6)
ERYTHROCYTE [DISTWIDTH] IN BLOOD BY AUTOMATED COUNT: 13.6 % (ref 11.6–15.1)
EST. AVERAGE GLUCOSE BLD GHB EST-MCNC: 100 MG/DL
FERRITIN SERPL-MCNC: 244 NG/ML (ref 8–388)
FOLATE SERPL-MCNC: >20 NG/ML (ref 3.1–17.5)
GFR SERPL CREATININE-BSD FRML MDRD: 88 ML/MIN/1.73SQ M
GLUCOSE P FAST SERPL-MCNC: 88 MG/DL (ref 65–99)
HBA1C MFR BLD: 5.1 %
HCT VFR BLD AUTO: 41.4 % (ref 34.8–46.1)
HGB BLD-MCNC: 13.8 G/DL (ref 11.5–15.4)
IMM GRANULOCYTES # BLD AUTO: 0.02 THOUSAND/UL (ref 0–0.2)
IMM GRANULOCYTES NFR BLD AUTO: 0 % (ref 0–2)
IRON SATN MFR SERPL: 28 %
IRON SERPL-MCNC: 85 UG/DL (ref 50–170)
LYMPHOCYTES # BLD AUTO: 3.65 THOUSANDS/ΜL (ref 0.6–4.47)
LYMPHOCYTES NFR BLD AUTO: 53 % (ref 14–44)
MCH RBC QN AUTO: 30.5 PG (ref 26.8–34.3)
MCHC RBC AUTO-ENTMCNC: 33.3 G/DL (ref 31.4–37.4)
MCV RBC AUTO: 92 FL (ref 82–98)
MONOCYTES # BLD AUTO: 0.39 THOUSAND/ΜL (ref 0.17–1.22)
MONOCYTES NFR BLD AUTO: 6 % (ref 4–12)
NEUTROPHILS # BLD AUTO: 2.75 THOUSANDS/ΜL (ref 1.85–7.62)
NEUTS SEG NFR BLD AUTO: 39 % (ref 43–75)
NRBC BLD AUTO-RTO: 0 /100 WBCS
PLATELET # BLD AUTO: 245 THOUSANDS/UL (ref 149–390)
PMV BLD AUTO: 11 FL (ref 8.9–12.7)
POTASSIUM SERPL-SCNC: 3.3 MMOL/L (ref 3.5–5.3)
PROT SERPL-MCNC: 6.7 G/DL (ref 6.4–8.2)
PTH-INTACT SERPL-MCNC: 43.4 PG/ML (ref 18.4–80.1)
RBC # BLD AUTO: 4.52 MILLION/UL (ref 3.81–5.12)
SODIUM SERPL-SCNC: 140 MMOL/L (ref 136–145)
TIBC SERPL-MCNC: 302 UG/DL (ref 250–450)
VIT B12 SERPL-MCNC: 604 PG/ML (ref 100–900)
WBC # BLD AUTO: 6.99 THOUSAND/UL (ref 4.31–10.16)

## 2020-03-30 PROCEDURE — 84590 ASSAY OF VITAMIN A: CPT

## 2020-03-30 PROCEDURE — 83540 ASSAY OF IRON: CPT

## 2020-03-30 PROCEDURE — 83550 IRON BINDING TEST: CPT

## 2020-03-30 PROCEDURE — 82306 VITAMIN D 25 HYDROXY: CPT

## 2020-03-30 PROCEDURE — 83970 ASSAY OF PARATHORMONE: CPT

## 2020-03-30 PROCEDURE — 84630 ASSAY OF ZINC: CPT

## 2020-03-30 PROCEDURE — 85025 COMPLETE CBC W/AUTO DIFF WBC: CPT

## 2020-03-30 PROCEDURE — 36415 COLL VENOUS BLD VENIPUNCTURE: CPT

## 2020-03-30 PROCEDURE — 82607 VITAMIN B-12: CPT

## 2020-03-30 PROCEDURE — 84207 ASSAY OF VITAMIN B-6: CPT

## 2020-03-30 PROCEDURE — 84425 ASSAY OF VITAMIN B-1: CPT

## 2020-03-30 PROCEDURE — 80053 COMPREHEN METABOLIC PANEL: CPT

## 2020-03-30 PROCEDURE — 82746 ASSAY OF FOLIC ACID SERUM: CPT

## 2020-03-30 PROCEDURE — 82525 ASSAY OF COPPER: CPT

## 2020-03-30 PROCEDURE — 82728 ASSAY OF FERRITIN: CPT

## 2020-03-30 PROCEDURE — 83036 HEMOGLOBIN GLYCOSYLATED A1C: CPT

## 2020-03-30 NOTE — TELEPHONE ENCOUNTER
Advised patient on labs from 03/30/20:    -Potassium low (3 3) - push potassium rich foods and fluids (gave her ideas and options for high potassium foods and fluids) and will repeat BMP in about 3 weeks    -Will f/u with remaining labs    Addendum 04/02/20:   Left message for the patient regarding lab results:    -B12 is 604 - she does not need to get B12 injections  I advised her that she may take an additional 1,000mcg sublingual B12 daily for 1-2 months      -B1 pending - will f/u with results    -Rest of vitamins WNL   Will repeat labs in July, BMP in 3 weeks

## 2020-04-01 LAB
COPPER SERPL-MCNC: 110 UG/DL (ref 72–166)
VIT A SERPL-MCNC: 27.8 UG/DL (ref 18.9–57.3)
VIT B6 SERPL-MCNC: 17.4 UG/L (ref 2–32.8)
ZINC SERPL-MCNC: 129 UG/DL (ref 56–134)

## 2020-04-02 LAB — VIT B1 BLD-SCNC: 167.3 NMOL/L (ref 66.5–200)

## 2020-04-14 ENCOUNTER — TELEMEDICINE (OUTPATIENT)
Dept: BARIATRICS | Facility: CLINIC | Age: 35
End: 2020-04-14
Payer: COMMERCIAL

## 2020-04-14 VITALS — HEIGHT: 70 IN | BODY MASS INDEX: 37.62 KG/M2 | WEIGHT: 262.8 LBS

## 2020-04-14 DIAGNOSIS — F33.42 RECURRENT MAJOR DEPRESSIVE DISORDER, IN FULL REMISSION (HCC): ICD-10-CM

## 2020-04-14 DIAGNOSIS — K91.2 POSTSURGICAL MALABSORPTION: ICD-10-CM

## 2020-04-14 DIAGNOSIS — G47.33 OBSTRUCTIVE SLEEP APNEA: ICD-10-CM

## 2020-04-14 DIAGNOSIS — K59.00 CONSTIPATION: ICD-10-CM

## 2020-04-14 DIAGNOSIS — Z98.84 S/P GASTRIC BYPASS: ICD-10-CM

## 2020-04-14 DIAGNOSIS — Z48.815 ENCOUNTER FOR SURGICAL AFTERCARE FOLLOWING SURGERY OF DIGESTIVE SYSTEM: Primary | ICD-10-CM

## 2020-04-14 PROCEDURE — 99214 OFFICE O/P EST MOD 30 MIN: CPT | Performed by: PHYSICIAN ASSISTANT

## 2020-04-14 RX ORDER — PANTOPRAZOLE SODIUM 40 MG/1
40 TABLET, DELAYED RELEASE ORAL DAILY
Qty: 90 TABLET | Refills: 1 | Status: SHIPPED | OUTPATIENT
Start: 2020-04-14 | End: 2020-07-06

## 2020-04-23 ENCOUNTER — TELEPHONE (OUTPATIENT)
Dept: BARIATRICS | Facility: CLINIC | Age: 35
End: 2020-04-23

## 2020-04-23 DIAGNOSIS — K59.00 CONSTIPATION, UNSPECIFIED CONSTIPATION TYPE: Primary | ICD-10-CM

## 2020-04-23 RX ORDER — DICYCLOMINE HCL 20 MG
20 TABLET ORAL EVERY 6 HOURS
Qty: 30 TABLET | Refills: 3 | Status: SHIPPED | OUTPATIENT
Start: 2020-04-23 | End: 2020-04-23 | Stop reason: SDUPTHER

## 2020-04-23 RX ORDER — DICYCLOMINE HCL 20 MG
20 TABLET ORAL EVERY 6 HOURS
Qty: 30 TABLET | Refills: 3 | Status: SHIPPED | OUTPATIENT
Start: 2020-04-23

## 2020-04-29 DIAGNOSIS — N39.0 URINARY TRACT INFECTION WITHOUT HEMATURIA, SITE UNSPECIFIED: Primary | ICD-10-CM

## 2020-04-29 RX ORDER — CIPROFLOXACIN 500 MG/1
500 TABLET, FILM COATED ORAL EVERY 12 HOURS SCHEDULED
Qty: 6 TABLET | Refills: 0 | Status: SHIPPED | OUTPATIENT
Start: 2020-04-29 | End: 2020-05-02

## 2020-04-30 ENCOUNTER — APPOINTMENT (OUTPATIENT)
Dept: LAB | Facility: HOSPITAL | Age: 35
End: 2020-04-30
Payer: COMMERCIAL

## 2020-04-30 DIAGNOSIS — E87.6 HYPOKALEMIA: ICD-10-CM

## 2020-04-30 DIAGNOSIS — K91.2 POSTSURGICAL MALABSORPTION: ICD-10-CM

## 2020-04-30 LAB
ANION GAP SERPL CALCULATED.3IONS-SCNC: 10 MMOL/L (ref 4–13)
BUN SERPL-MCNC: 20 MG/DL (ref 5–25)
CALCIUM SERPL-MCNC: 9.1 MG/DL (ref 8.3–10.1)
CHLORIDE SERPL-SCNC: 107 MMOL/L (ref 100–108)
CO2 SERPL-SCNC: 26 MMOL/L (ref 21–32)
CREAT SERPL-MCNC: 0.89 MG/DL (ref 0.6–1.3)
GFR SERPL CREATININE-BSD FRML MDRD: 85 ML/MIN/1.73SQ M
GLUCOSE P FAST SERPL-MCNC: 77 MG/DL (ref 65–99)
POTASSIUM SERPL-SCNC: 3.3 MMOL/L (ref 3.5–5.3)
SODIUM SERPL-SCNC: 143 MMOL/L (ref 136–145)

## 2020-04-30 PROCEDURE — 80048 BASIC METABOLIC PNL TOTAL CA: CPT

## 2020-04-30 PROCEDURE — 36415 COLL VENOUS BLD VENIPUNCTURE: CPT

## 2020-05-01 ENCOUNTER — TELEPHONE (OUTPATIENT)
Dept: BARIATRICS | Facility: CLINIC | Age: 35
End: 2020-05-01

## 2020-05-13 ENCOUNTER — CLINICAL SUPPORT (OUTPATIENT)
Dept: FAMILY MEDICINE CLINIC | Facility: CLINIC | Age: 35
End: 2020-05-13
Payer: COMMERCIAL

## 2020-05-13 DIAGNOSIS — Z23 NEED FOR HEPATITIS B VACCINATION: Primary | ICD-10-CM

## 2020-05-13 PROCEDURE — 90746 HEPB VACCINE 3 DOSE ADULT IM: CPT | Performed by: FAMILY MEDICINE

## 2020-05-13 PROCEDURE — 90471 IMMUNIZATION ADMIN: CPT | Performed by: FAMILY MEDICINE

## 2020-06-09 ENCOUNTER — OFFICE VISIT (OUTPATIENT)
Dept: BARIATRICS | Facility: CLINIC | Age: 35
End: 2020-06-09

## 2020-06-09 ENCOUNTER — TELEPHONE (OUTPATIENT)
Dept: BARIATRICS | Facility: CLINIC | Age: 35
End: 2020-06-09

## 2020-06-09 VITALS — HEIGHT: 70 IN | WEIGHT: 248.8 LBS | BODY MASS INDEX: 35.62 KG/M2

## 2020-06-09 DIAGNOSIS — Z98.84 S/P GASTRIC BYPASS: Primary | ICD-10-CM

## 2020-06-09 PROCEDURE — RECHECK

## 2020-06-10 ENCOUNTER — TRANSCRIBE ORDERS (OUTPATIENT)
Dept: ADMINISTRATIVE | Facility: HOSPITAL | Age: 35
End: 2020-06-10

## 2020-06-10 ENCOUNTER — APPOINTMENT (OUTPATIENT)
Dept: LAB | Facility: HOSPITAL | Age: 35
End: 2020-06-10
Payer: COMMERCIAL

## 2020-06-10 DIAGNOSIS — K91.2 POSTSURGICAL MALABSORPTION: ICD-10-CM

## 2020-06-10 DIAGNOSIS — Z48.815 ENCOUNTER FOR SURGICAL AFTERCARE FOLLOWING SURGERY OF DIGESTIVE SYSTEM: ICD-10-CM

## 2020-06-10 DIAGNOSIS — R11.0 NAUSEA: Primary | ICD-10-CM

## 2020-06-10 DIAGNOSIS — E87.6 HYPOKALEMIA: Primary | ICD-10-CM

## 2020-06-10 DIAGNOSIS — E87.6 HYPOKALEMIA: ICD-10-CM

## 2020-06-10 LAB
25(OH)D3 SERPL-MCNC: 46.3 NG/ML (ref 30–100)
ALBUMIN SERPL BCP-MCNC: 3.7 G/DL (ref 3.5–5)
ALP SERPL-CCNC: 90 U/L (ref 46–116)
ALT SERPL W P-5'-P-CCNC: 32 U/L (ref 12–78)
ANION GAP SERPL CALCULATED.3IONS-SCNC: 12 MMOL/L (ref 4–13)
AST SERPL W P-5'-P-CCNC: 16 U/L (ref 5–45)
BASOPHILS # BLD AUTO: 0.03 THOUSANDS/ΜL (ref 0–0.1)
BASOPHILS NFR BLD AUTO: 0 % (ref 0–1)
BILIRUB SERPL-MCNC: 0.4 MG/DL (ref 0.2–1)
BUN SERPL-MCNC: 20 MG/DL (ref 5–25)
CALCIUM SERPL-MCNC: 8.9 MG/DL (ref 8.3–10.1)
CHLORIDE SERPL-SCNC: 106 MMOL/L (ref 100–108)
CO2 SERPL-SCNC: 25 MMOL/L (ref 21–32)
CREAT SERPL-MCNC: 0.86 MG/DL (ref 0.6–1.3)
EOSINOPHIL # BLD AUTO: 0.12 THOUSAND/ΜL (ref 0–0.61)
EOSINOPHIL NFR BLD AUTO: 2 % (ref 0–6)
ERYTHROCYTE [DISTWIDTH] IN BLOOD BY AUTOMATED COUNT: 12.4 % (ref 11.6–15.1)
FERRITIN SERPL-MCNC: 197 NG/ML (ref 8–388)
FOLATE SERPL-MCNC: >20 NG/ML (ref 3.1–17.5)
GFR SERPL CREATININE-BSD FRML MDRD: 88 ML/MIN/1.73SQ M
GLUCOSE P FAST SERPL-MCNC: 87 MG/DL (ref 65–99)
HCT VFR BLD AUTO: 40.3 % (ref 34.8–46.1)
HGB BLD-MCNC: 12.9 G/DL (ref 11.5–15.4)
IMM GRANULOCYTES # BLD AUTO: 0.01 THOUSAND/UL (ref 0–0.2)
IMM GRANULOCYTES NFR BLD AUTO: 0 % (ref 0–2)
IRON SATN MFR SERPL: 20 %
IRON SERPL-MCNC: 57 UG/DL (ref 50–170)
LYMPHOCYTES # BLD AUTO: 4.01 THOUSANDS/ΜL (ref 0.6–4.47)
LYMPHOCYTES NFR BLD AUTO: 52 % (ref 14–44)
MCH RBC QN AUTO: 30.1 PG (ref 26.8–34.3)
MCHC RBC AUTO-ENTMCNC: 32 G/DL (ref 31.4–37.4)
MCV RBC AUTO: 94 FL (ref 82–98)
MONOCYTES # BLD AUTO: 0.46 THOUSAND/ΜL (ref 0.17–1.22)
MONOCYTES NFR BLD AUTO: 6 % (ref 4–12)
NEUTROPHILS # BLD AUTO: 3.02 THOUSANDS/ΜL (ref 1.85–7.62)
NEUTS SEG NFR BLD AUTO: 40 % (ref 43–75)
NRBC BLD AUTO-RTO: 0 /100 WBCS
PLATELET # BLD AUTO: 274 THOUSANDS/UL (ref 149–390)
PMV BLD AUTO: 10.9 FL (ref 8.9–12.7)
POTASSIUM SERPL-SCNC: 3.5 MMOL/L (ref 3.5–5.3)
PROT SERPL-MCNC: 6.7 G/DL (ref 6.4–8.2)
PTH-INTACT SERPL-MCNC: 48.2 PG/ML (ref 18.4–80.1)
RBC # BLD AUTO: 4.29 MILLION/UL (ref 3.81–5.12)
SODIUM SERPL-SCNC: 143 MMOL/L (ref 136–145)
TIBC SERPL-MCNC: 280 UG/DL (ref 250–450)
VIT B12 SERPL-MCNC: 788 PG/ML (ref 100–900)
WBC # BLD AUTO: 7.65 THOUSAND/UL (ref 4.31–10.16)

## 2020-06-10 PROCEDURE — 83540 ASSAY OF IRON: CPT

## 2020-06-10 PROCEDURE — 85025 COMPLETE CBC W/AUTO DIFF WBC: CPT

## 2020-06-10 PROCEDURE — 84630 ASSAY OF ZINC: CPT

## 2020-06-10 PROCEDURE — 84425 ASSAY OF VITAMIN B-1: CPT

## 2020-06-10 PROCEDURE — 82306 VITAMIN D 25 HYDROXY: CPT

## 2020-06-10 PROCEDURE — 36415 COLL VENOUS BLD VENIPUNCTURE: CPT

## 2020-06-10 PROCEDURE — 80053 COMPREHEN METABOLIC PANEL: CPT

## 2020-06-10 PROCEDURE — 83550 IRON BINDING TEST: CPT

## 2020-06-10 PROCEDURE — 82728 ASSAY OF FERRITIN: CPT

## 2020-06-10 PROCEDURE — 84590 ASSAY OF VITAMIN A: CPT

## 2020-06-10 PROCEDURE — 82746 ASSAY OF FOLIC ACID SERUM: CPT

## 2020-06-10 PROCEDURE — 83970 ASSAY OF PARATHORMONE: CPT

## 2020-06-10 PROCEDURE — 82607 VITAMIN B-12: CPT

## 2020-06-10 RX ORDER — ONDANSETRON 4 MG/1
4 TABLET, ORALLY DISINTEGRATING ORAL EVERY 6 HOURS PRN
Qty: 30 TABLET | Refills: 0 | Status: SHIPPED | OUTPATIENT
Start: 2020-06-10 | End: 2020-08-11

## 2020-06-12 ENCOUNTER — OFFICE VISIT (OUTPATIENT)
Dept: BARIATRICS | Facility: CLINIC | Age: 35
End: 2020-06-12

## 2020-06-12 DIAGNOSIS — Z98.84 S/P GASTRIC BYPASS: Primary | ICD-10-CM

## 2020-06-12 PROCEDURE — RECHECK

## 2020-06-14 LAB — VIT B1 BLD-SCNC: 181.7 NMOL/L (ref 66.5–200)

## 2020-06-15 ENCOUNTER — TELEPHONE (OUTPATIENT)
Dept: BARIATRICS | Facility: CLINIC | Age: 35
End: 2020-06-15

## 2020-06-15 DIAGNOSIS — K59.00 CONSTIPATION: ICD-10-CM

## 2020-06-15 DIAGNOSIS — K91.2 POSTSURGICAL MALABSORPTION: Primary | ICD-10-CM

## 2020-06-15 DIAGNOSIS — E61.1 LOW IRON: ICD-10-CM

## 2020-06-15 LAB
VIT A SERPL-MCNC: 48.9 UG/DL (ref 18.9–57.3)
ZINC SERPL-MCNC: 94 UG/DL (ref 56–134)

## 2020-06-15 RX ORDER — POTASSIUM CHLORIDE 750 MG/1
10 CAPSULE, EXTENDED RELEASE ORAL
COMMUNITY
Start: 2020-05-04 | End: 2021-12-03

## 2020-06-15 RX ORDER — POTASSIUM CHLORIDE 1.5 G/1.77G
20 POWDER, FOR SOLUTION ORAL
COMMUNITY
Start: 2020-05-05 | End: 2020-07-21 | Stop reason: DRUGHIGH

## 2020-06-16 ENCOUNTER — OFFICE VISIT (OUTPATIENT)
Dept: BARIATRICS | Facility: CLINIC | Age: 35
End: 2020-06-16

## 2020-06-16 VITALS — WEIGHT: 248.4 LBS | TEMPERATURE: 99.8 F | BODY MASS INDEX: 35.56 KG/M2 | HEIGHT: 70 IN

## 2020-06-16 DIAGNOSIS — Z98.84 S/P GASTRIC BYPASS: Primary | ICD-10-CM

## 2020-06-16 PROCEDURE — RECHECK

## 2020-06-17 ENCOUNTER — OFFICE VISIT (OUTPATIENT)
Dept: GASTROENTEROLOGY | Facility: CLINIC | Age: 35
End: 2020-06-17
Payer: COMMERCIAL

## 2020-06-17 VITALS
HEART RATE: 82 BPM | HEIGHT: 70 IN | WEIGHT: 249 LBS | DIASTOLIC BLOOD PRESSURE: 80 MMHG | TEMPERATURE: 97.7 F | BODY MASS INDEX: 35.65 KG/M2 | SYSTOLIC BLOOD PRESSURE: 110 MMHG

## 2020-06-17 DIAGNOSIS — R10.9 ABDOMINAL PAIN, UNSPECIFIED ABDOMINAL LOCATION: ICD-10-CM

## 2020-06-17 DIAGNOSIS — K59.00 CONSTIPATION, UNSPECIFIED CONSTIPATION TYPE: Primary | ICD-10-CM

## 2020-06-17 DIAGNOSIS — R11.0 NAUSEA: ICD-10-CM

## 2020-06-17 PROCEDURE — 99213 OFFICE O/P EST LOW 20 MIN: CPT | Performed by: PHYSICIAN ASSISTANT

## 2020-06-17 PROCEDURE — 1036F TOBACCO NON-USER: CPT | Performed by: PHYSICIAN ASSISTANT

## 2020-06-17 PROCEDURE — 3008F BODY MASS INDEX DOCD: CPT | Performed by: PHYSICIAN ASSISTANT

## 2020-06-17 RX ORDER — LANOLIN ALCOHOL/MO/W.PET/CERES
1000 CREAM (GRAM) TOPICAL DAILY
COMMUNITY
End: 2020-07-21 | Stop reason: ALTCHOICE

## 2020-06-19 ENCOUNTER — OFFICE VISIT (OUTPATIENT)
Dept: BARIATRICS | Facility: CLINIC | Age: 35
End: 2020-06-19

## 2020-06-19 VITALS — HEIGHT: 70 IN | WEIGHT: 248.6 LBS | BODY MASS INDEX: 35.59 KG/M2

## 2020-06-19 DIAGNOSIS — Z98.84 S/P GASTRIC BYPASS: Primary | ICD-10-CM

## 2020-06-19 PROCEDURE — RECHECK

## 2020-06-22 ENCOUNTER — TELEPHONE (OUTPATIENT)
Dept: HEMATOLOGY ONCOLOGY | Facility: CLINIC | Age: 35
End: 2020-06-22

## 2020-06-29 ENCOUNTER — TELEPHONE (OUTPATIENT)
Dept: BARIATRICS | Facility: CLINIC | Age: 35
End: 2020-06-29

## 2020-06-30 ENCOUNTER — OFFICE VISIT (OUTPATIENT)
Dept: BARIATRICS | Facility: CLINIC | Age: 35
End: 2020-06-30

## 2020-06-30 VITALS — HEIGHT: 70 IN | WEIGHT: 244.4 LBS | BODY MASS INDEX: 34.99 KG/M2

## 2020-06-30 DIAGNOSIS — Z98.84 S/P GASTRIC BYPASS: Primary | ICD-10-CM

## 2020-06-30 PROCEDURE — RECHECK

## 2020-07-01 NOTE — ASSESSMENT & PLAN NOTE
-On Linzess; recently seen by GI last month to adjust her dose   They also highly recommended that she increase exercise  -Increase fluids to at least 64oz+ daily   -Slowly increase fiber rich non-starchy vegetables as tolerated  -Increase soluble fiber (try oatmeal and ground flax seeds and may soak prune)  -Try natural calm magnesium supplement at night  -Increase physical activity as tolerated

## 2020-07-01 NOTE — ASSESSMENT & PLAN NOTE
-s/p Sandip-En-Y Gastric Bypass with Dr Tank Saba on 12/30/19  Overall doing Well  EWL is 63%, which places the patient on schedule for expected post surgical weight loss at this time  She is doing very well and back on track with eating healthy and weight loss goals since she started working again  Initial: 355lbs  Current: 241 2lbs per home scale    EWL: 63%  Hi: current  Current BMI is Body mass index is 34 61 kg/m²  · Tolerating a regular diet-yes  · Eating at least 60 grams of protein per day-yes  · Following 30/60 minute rule with liquids-yes  · Drinking at least 64 ounces of fluid per day-yes  · Drinking carbonated beverages-no  · Sufficient exercise-walking at least 1 mile/day and sometimes using hand weights   She agrees to walk multiple times daily to goal of 1 hour  · Using NSAIDs regularly-no  · Using nicotine-no  · Using alcohol-no - Advised about the risks of alcohol s/p bariatric surgery and recommend avoiding all alcohol  · Slowly taper off PPI

## 2020-07-01 NOTE — ASSESSMENT & PLAN NOTE
-At risk for malabsorption of vitamins/minerals secondary to malabsorption and restriction of intake from bariatric surgery  -Currently taking adequate postop bariatric surgery vitamin supplementation: Procare MVI with 45mg iron, Advantage calcium citrate TID  -Last set of bariatric labs completed on 06/10/20 and showed vitamins WNL  However, her iron and ferritin levels are trending down  She is unable to tolerate oral iron d/t chronic constipation issues so hematology referral placed for iron infusions    -Potassium normalized on most recent labs (she continues with oral potassium per her PCP)  -Repeat iron and ferritin in 3 months   Rest of panel to be repeated again in 6 months    -Patient received education about the importance of adhering to a lifelong supplementation regimen to avoid vitamin/mineral deficiencies

## 2020-07-02 ENCOUNTER — TELEMEDICINE (OUTPATIENT)
Dept: BARIATRICS | Facility: CLINIC | Age: 35
End: 2020-07-02
Payer: COMMERCIAL

## 2020-07-02 VITALS — BODY MASS INDEX: 34.53 KG/M2 | WEIGHT: 241.2 LBS | HEIGHT: 70 IN

## 2020-07-02 DIAGNOSIS — K91.2 POSTSURGICAL MALABSORPTION: ICD-10-CM

## 2020-07-02 DIAGNOSIS — K59.00 CONSTIPATION: Primary | ICD-10-CM

## 2020-07-02 DIAGNOSIS — Z48.815 ENCOUNTER FOR SURGICAL AFTERCARE FOLLOWING SURGERY OF DIGESTIVE SYSTEM: ICD-10-CM

## 2020-07-02 PROCEDURE — 3008F BODY MASS INDEX DOCD: CPT | Performed by: PHYSICIAN ASSISTANT

## 2020-07-02 PROCEDURE — 99214 OFFICE O/P EST MOD 30 MIN: CPT | Performed by: PHYSICIAN ASSISTANT

## 2020-07-02 PROCEDURE — 1036F TOBACCO NON-USER: CPT | Performed by: PHYSICIAN ASSISTANT

## 2020-07-02 NOTE — PATIENT INSTRUCTIONS
GOALS:   · Continued/Maintain healthy weight loss with good nutrition intakes  · Adequate hydration with at least 64oz  fluid intake  · Normal vitamin and mineral levels  · Exercise as tolerated  · Walk multiple times/day - goal is 1 hour of walking  · Try adding in oatmeal and ground flax seed  · Try natural calm magnesium supplement at night before bed to help with constipation  · Follow up with Hematology for iron infuxions     · Follow-up in 6 months  We kindly ask that your arrive 15 minutes before your scheduled appointment time with your provider to allow our staff to room you, get your vital signs and update your chart  · Follow diet as discussed  · Get lab work done in the next 2 weeks  You have been given a lab slip today  Please call the office if you need a replacement  It is recommended to check with your insurance BEFORE getting labs done to make sure they are covered by your policy  Also, please check with your PCP and other providers before getting labs to avoid duplicate labs  Make sure to HOLD any multivitamins that may contain biotin and any biotin supplements FOR 5 DAYS before any labs since it can affect the results  · Follow vitamin and mineral recommendations as reviewed with you  · Call our office if you have any problems with abdominal pain especially associated with fever, chills, nausea, vomiting or any other concerns  · All  Post-bariatric surgery patients should be aware that very small quantities of any alcohol can cause impairment and it is very possible not to feel the effect  The effect can be in the system for several hours  It is also a stomach irritant  · It is advised to AVOID alcohol, Nonsteroidal antiinflammatory drugs (NSAIDS) and nicotine of all forms   Any of these can cause stomach irritation/pain

## 2020-07-02 NOTE — PROGRESS NOTES
Virtual Regular Visit      Assessment/Plan:    Problem List Items Addressed This Visit        Digestive    Postsurgical malabsorption     -At risk for malabsorption of vitamins/minerals secondary to malabsorption and restriction of intake from bariatric surgery  -Currently taking adequate postop bariatric surgery vitamin supplementation: Procare MVI with 45mg iron, Advantage calcium citrate TID  -Last set of bariatric labs completed on 06/10/20 and showed vitamins WNL  However, her iron and ferritin levels are trending down  She is unable to tolerate oral iron d/t chronic constipation issues so hematology referral placed for iron infusions    -Potassium normalized on most recent labs (she continues with oral potassium per her PCP)  -Repeat iron and ferritin in 3 months  Rest of panel to be repeated again in 6 months    -Patient received education about the importance of adhering to a lifelong supplementation regimen to avoid vitamin/mineral deficiencies          Relevant Orders    CBC and differential    Comprehensive metabolic panel    Ferritin    Folate    Iron Saturation %    Hemoglobin A1C    Zinc    Vitamin D 25 hydroxy    Vitamin B12    Vitamin B1, whole blood    Vitamin A    PTH, intact    Lipid panel       Other    Encounter for surgical aftercare following surgery of digestive system     -s/p Sandip-En-Y Gastric Bypass with Dr Reshma Lee on 12/30/19  Overall doing Well  EWL is 63%, which places the patient on schedule for expected post surgical weight loss at this time  She is doing very well and back on track with eating healthy and weight loss goals since she started working again  Initial: 355lbs  Current: 241 2lbs per home scale    EWL: 63%  Hi: current  Current BMI is Body mass index is 34 61 kg/m²      · Tolerating a regular diet-yes  · Eating at least 60 grams of protein per day-yes  · Following 30/60 minute rule with liquids-yes  · Drinking at least 64 ounces of fluid per day-yes  · Drinking carbonated beverages-no  · Sufficient exercise-walking at least 1 mile/day and sometimes using hand weights  She agrees to walk multiple times daily to goal of 1 hour  · Using NSAIDs regularly-no  · Using nicotine-no  · Using alcohol-no - Advised about the risks of alcohol s/p bariatric surgery and recommend avoiding all alcohol  · Slowly taper off PPI            Relevant Orders    CBC and differential    Comprehensive metabolic panel    Ferritin    Folate    Iron Saturation %    Hemoglobin A1C    Zinc    Vitamin D 25 hydroxy    Vitamin B12    Vitamin B1, whole blood    Vitamin A    PTH, intact    Lipid panel    Constipation - Primary     -On Linzess; recently seen by GI last month to adjust her dose  They also highly recommended that she increase exercise  -Increase fluids to at least 64oz+ daily   -Slowly increase fiber rich non-starchy vegetables as tolerated  -Increase soluble fiber (try oatmeal and ground flax seeds and may soak prune)  -Try natural calm magnesium supplement at night  -Increase physical activity as tolerated                          Reason for visit is   Chief Complaint   Patient presents with    Post-op     Pt is having virtual visit for post op   Virtual Regular Visit        Encounter provider Dominique Ulloa PA-C    Provider located at [address] [place] [address] [facility]  [facility]  [address] [place] [id number] [phone]      Recent Visits  Date Type Provider Dept   06/29/20 Telephone Jacklyn Perez [facility]   Showing recent visits within past 7 days and meeting all other requirements     Today's Visits  Date Type Provider Dept   07/02/20 Telemedicine Dominique Ulloa PA-C [facility]   Showing today's visits and meeting all other requirements     Future Appointments  No visits were found meeting these conditions     Showing future appointments within next 150 days and meeting all other requirements        The patient was identified by name and date of birth  Tita Garland was informed that this is a telemedicine visit and that the visit is being conducted through [address] and patient was informed that this is not a secure, HIPAA-complaint platform  She agrees to proceed     My office door was closed  No one else was in the room  She acknowledged consent and understanding of privacy and security of the video platform  The patient has agreed to participate and understands they can discontinue the visit at any time  Patient is aware this is a billable service  Subjective  Tita Garland is a 29 y o  female s/p Rowan-En-Y Gastric Bypass with Dr Shira Luna on 12/30/19  She is doing much better since starting an increased dose of Linzess and now having regular bowel movements  She is back on track with her diet and no longer drinking wine since starting back at work  She is very happy with her weight loss and feeling great with no complaints other than constipation      Past Medical History:   Diagnosis Date    Abnormal Pap smear of cervix     Anxiety     ASCUS with positive high risk HPV cervical 11/6/2018    Back pain     CPAP (continuous positive airway pressure) dependence     Depressed     Femur fracture (HCC)     GERD (gastroesophageal reflux disease)     HPV (human papilloma virus) infection     Hypertension     Hx post surgery stable    Irritable bowel syndrome     Morbid obesity with BMI of 50 0-59 9, adult ([facility] [address] )     Obesity     Postgastrectomy malabsorption     Pre-diabetes     Prediabetes 10/8/2018    Overview:  Per Prediabetes protocol #1    Sleep apnea        Past Surgical History:   Procedure Laterality Date    ABDOMINAL SURGERY      Gastric Bypass, December 2019    COLPOSCOPY  9/2018    EGD      FASCIOTOMY Left     left calf    FEMUR FRACTURE SURGERY Right     ND LAP GASTRIC BYPASS/ROWAN-EN-Y N/A 12/30/2019    Procedure: BYPASS GASTRIC  ROWAN-EN-Y LAPAROSCOPIC WITH INTRAOPERATIVE EGD;  Surgeon: Darroll Mcardle, MD;  Location: [address] [place] [address];  Service: Bariatrics    WISDOM TOOTH EXTRACTION         Current Outpatient Medications   Medication Sig Dispense Refill    acyclovir (ZOVIRAX) 400 MG tablet as needed       baclofen 20 mg tablet as needed       buPROPion (WELLBUTRIN) 75 mg tablet 150 mg 2 (two) times a day       calcium citrate-vitamin d (Calcium Citrate Chewy Bite) 500-500 MG-UNIT CHEW chewable tablet Chew 1 tablet 3 (three) times a day      dicyclomine (BENTYL) 20 mg tablet Take 1 tablet (20 mg total) by mouth every 6 (six) hours 30 tablet 3    docusate sodium (COLACE) 100 mg capsule Take 100 mg by mouth 2 (two) times a day      escitalopram (LEXAPRO) 10 mg tablet 5 mg daily       hydrocortisone (ANUSOL-HC, PROCTOSOL HC,) 2 5 % rectal cream Insert into the rectum 2 (two) times a day (Patient taking differently: Insert into the rectum as needed ) 30 g 0    linaCLOtide (Linzess) 290 MCG CAPS Take 1 capsule by mouth daily (Patient taking differently: Take 290 mcg by mouth 2 (two) times a day ) 90 capsule 3    MIRENA, 52 MG, 20 MCG/24HR IUD TO BE INSERTED ONE TIME BY PRESCRIBER  ROUTE INTRAUTERINE   0    Multiple Vitamin (MULTI VITAMIN PO) Take 1 tablet by mouth daily       ondansetron (ZOFRAN-ODT) 4 mg disintegrating tablet Take 1 tablet (4 mg total) by mouth every 6 (six) hours as needed for nausea or vomiting 30 tablet 0    pantoprazole (PROTONIX) 40 mg tablet Take 1 tablet (40 mg total) by mouth daily 90 tablet 1    potassium chloride (MICRO-K) 10 MEQ CR capsule Take 10 mEq by mouth      vitamin B-12 (VITAMIN B-12) 1,000 mcg tablet Take 1,000 mcg by mouth daily      potassium chloride (KLOR-CON) 20 mEq packet Take 20 mEq by mouth       No current facility-administered medications for this visit  No Known Allergies    Review of Systems   Constitutional: Negative for chills and fever  Unexpected weight change: planned weight loss  HENT: Negative for trouble swallowing  Respiratory: Negative for cough and shortness of breath  Cardiovascular: Negative for chest pain and palpitations  Gastrointestinal: Positive for constipation  Negative for abdominal pain, diarrhea, nausea and vomiting  Neurological: Negative for dizziness  Psychiatric/Behavioral:        Denies anxiety and depression       Video Exam:  GEN: awake, alert, non-diaphoretic, no psychomotor agitation, no acute distress    HEENT :Head: atraumatic, normocephalic, no rashes noted, no lesions noted    Eyes: NO redness, discharge, swelling, or lesions    Nose: NO redness, swelling, discharge, deformity, or impetigo/crusting    Skin: no lesions, wounds, erythema, or cyanosis noted on face or hands    Cardiopulmonary: no increased respiratory effort, speaking in clear sentences, I:E ratio WNL    Abdomen: soft, non-distended, non-tender to guided self palpation    Neuro: speech normal rate and rhythm, orientation arrived to appointment on time with no prompting, moved both upper extremities equally    Pysch: appearance, behavior, and attitude- well groomed, pleasant, cooperative      Vitals:    07/02/20 1427   Weight: 109 kg (241 lb 3 2 oz)   Height: 5' 10" (1 778 m)   per home scale    As a result of this visit, I have not referred the patient for further respiratory evaluation  I spent 30 minutes with patient today in which greater than 50% of the time was spent in counseling/coordination of care regarding post op progression, diet, exercise, vitamins, constipation      VIRTUAL VISIT DISCLAIMER    Leonie Nye acknowledges that she has consented to an online visit or consultation  She understands that the online visit is based solely on information provided by her, and that, in the absence of a face-to-face physical evaluation by the physician, the diagnosis she receives is both limited and provisional in terms of accuracy and completeness   This is not intended to replace a full medical face-to-face evaluation by the physician  Isaac Becker understands and accepts these terms

## 2020-07-06 ENCOUNTER — TELEPHONE (OUTPATIENT)
Dept: BARIATRICS | Facility: CLINIC | Age: 35
End: 2020-07-06

## 2020-07-06 DIAGNOSIS — K21.9 GERD (GASTROESOPHAGEAL REFLUX DISEASE): Primary | ICD-10-CM

## 2020-07-06 RX ORDER — PANTOPRAZOLE SODIUM 20 MG/1
20 TABLET, DELAYED RELEASE ORAL DAILY
Qty: 60 TABLET | Refills: 0 | Status: SHIPPED | OUTPATIENT
Start: 2020-07-06 | End: 2020-10-08

## 2020-07-17 ENCOUNTER — TELEPHONE (OUTPATIENT)
Dept: BARIATRICS | Facility: CLINIC | Age: 35
End: 2020-07-17

## 2020-07-17 NOTE — TELEPHONE ENCOUNTER
Patient reports bruising appearing on her legs and itching and hives on her legs and sometimes arms that started about 3 days ago  She denies any SOB, tightness in her throat, heart palpitations  She did not change anything in her routine and is currently driving to [place] for vacation and took claritin and is starting to feel better  Advised her that bruising may be r/t dropping iron levels and she will f/u with Hematology next week  Continue allergy meds and contact PCP if the itching continues

## 2020-07-17 NOTE — TELEPHONE ENCOUNTER
Patient calls asking to speak with PAULO Wasserman  Patient is concerned about hives and bruising  She describes experiencing numerous dark bruises on her legs yesterday for no apparent reason, since she did not bump them at all  She wonders if this could have to do with her low iron numbers? Patient also experienced itching and hives on her legs, feet and back  She has not been using any new or different soaps/perfumes/laundry detergent, etc , or eating anything different at all  Today she is on her way to [place] for a few days and all these symptoms are completely gone  Patient just felt it would be a good idea to check with our PA  She does indicate she has an appointment with Hematology next Tuesday

## 2020-07-21 ENCOUNTER — CONSULT (OUTPATIENT)
Dept: HEMATOLOGY ONCOLOGY | Facility: MEDICAL CENTER | Age: 35
End: 2020-07-21
Payer: COMMERCIAL

## 2020-07-21 VITALS
HEIGHT: 70 IN | SYSTOLIC BLOOD PRESSURE: 122 MMHG | HEART RATE: 73 BPM | RESPIRATION RATE: 18 BRPM | BODY MASS INDEX: 35.07 KG/M2 | WEIGHT: 245 LBS | OXYGEN SATURATION: 99 % | TEMPERATURE: 98.1 F | DIASTOLIC BLOOD PRESSURE: 88 MMHG

## 2020-07-21 DIAGNOSIS — K91.2 POSTSURGICAL MALABSORPTION: ICD-10-CM

## 2020-07-21 DIAGNOSIS — K59.00 CONSTIPATION: ICD-10-CM

## 2020-07-21 PROCEDURE — 99244 OFF/OP CNSLTJ NEW/EST MOD 40: CPT | Performed by: PHYSICIAN ASSISTANT

## 2020-07-21 NOTE — PROGRESS NOTES
[address] HEMATOLOGY ONCOLOGY [facility] [place]  [place] [address]  Progress Note  Carmen Nuñez, 1985, [id number]  7/21/2020    Assessment/Plan:  1  Postsurgical malabsorption  Patient underwent Sandip-en-Y for morbid obesity in December 2019  Patient follows with bariatric surgery  Due to bowel issues and trouble taking oral iron, patient was referred to Hematology to discuss IV iron treatment  However, patient's iron studies demonstrate sufficient iron stores and she is without anemia or other hematologic complication related to iron deficiency  I discussed with the patient that typically she would become iron deficient approximately 3-5 years post bariatric surgery  At this time, observation is indicated  Patient will continue with six month blood work through bariatric specialist   I will see her back in six months  If trend is stable, follow-up in one year after that would be advisable  We briefly discussed the use of IV iron  Given the patient's postsurgical malabsorption condition, IV iron would be the best way to supplement  Patient has never received IV iron in the past   I discussed with the patient and her boyfriend sources of dietary iron and have provided them with a dietary guide on the after visit summary  No oral iron is necessary at this time, as she has significant side effects  Patient require blood work prior to her follow-up appointment which has already been ordered by bariatric specialist     - Ambulatory referral to Hematology / Oncology    2  Constipation  Patient has constipation related to previous surgery  Iron obviously makes this worse  See 1  Above  - Ambulatory referral to Hematology / Oncology    The patient is scheduled for follow-up in approximately 6 months with Dr Dre Lindquist  Patient voiced agreement and understanding to the above   Patient knows to call the Hematology/Oncology office with any questions and concerns regarding the above  I have spent 30 minutes with Patient and family today in which greater than 50% of this time was spent in counseling/coordination of care regarding Diagnostic results, Prognosis and Impressions  Goals and Barriers:    Current Goal:   Prolong Survival from Cancer  Barriers: None  Patient's Capacity to Self Care:  Patient able to self care   -------------------------------------------------------------------------------------------------------    Chief Complaint   Patient presents with    Consult     Iron deficiency anemia        History of present illness/Cancer History: This is a 80-year-old female with past medical history of pulmonary embolism status post femur repair in 2016, ORIF of femur fracture due to accident/falling off of a horse, and Sandip-en-Y gastric bypass surgery in December 2019 who presents to the Hematology office secondary to concerns for post gastrotomy malabsorption  Patient has significant issues with constipation since her surgery  She is on multiple agents including Colace and Linzess  For this reason, normal iron supplementation was unable to be prescribed  Patient's iron studies demonstrate a ferritin of approximately 200 ng/dL with an iron saturation of 20%  Patient is asymptomatic of iron deficiency  Patient's CBC demonstrated normal white blood cell count, red blood cell count, platelet count  Red blood cell indices were also within normal limits  Patient does complain of cramping in the lower extremity, which occurs after or walking long distances  Patient notes that she does have restless leg type symptoms however she relates this back to Lexapro for which she has been on for a long time  Review of Systems   Constitutional: Negative for appetite change, fatigue, fever and unexpected weight change  HENT: Negative for nosebleeds  Respiratory: Negative for cough, choking and shortness of breath  Negative hemoptysis  Cardiovascular: Negative for chest pain, palpitations and leg swelling  Gastrointestinal: Negative  Negative for abdominal distention, abdominal pain, anal bleeding, blood in stool, constipation, diarrhea, nausea and vomiting  Endocrine: Negative  Negative for cold intolerance  Genitourinary: Negative  Negative for hematuria, menstrual problem (Patient is on the Mirena/ last menstrual period 7/7/20), vaginal bleeding, vaginal discharge and vaginal pain  Musculoskeletal: Negative  Negative for arthralgias, myalgias, neck pain and neck stiffness  Skin: Negative  Negative for color change, pallor and rash  Allergic/Immunologic: Negative  Negative for immunocompromised state  Neurological: Negative  Negative for weakness and headaches  Hematological: Negative for adenopathy  Does not bruise/bleed easily  All other systems reviewed and are negative          Current Outpatient Medications:     acyclovir (ZOVIRAX) 400 MG tablet, as needed , Disp: , Rfl:     baclofen 20 mg tablet, as needed , Disp: , Rfl:     buPROPion (WELLBUTRIN) 75 mg tablet, 150 mg 2 (two) times a day , Disp: , Rfl:     calcium citrate-vitamin d (Calcium Citrate Chewy Bite) 500-500 MG-UNIT CHEW chewable tablet, Chew 1 tablet 3 (three) times a day, Disp: , Rfl:     dicyclomine (BENTYL) 20 mg tablet, Take 1 tablet (20 mg total) by mouth every 6 (six) hours, Disp: 30 tablet, Rfl: 3    docusate sodium (COLACE) 100 mg capsule, Take 100 mg by mouth 2 (two) times a day, Disp: , Rfl:     escitalopram (LEXAPRO) 10 mg tablet, 5 mg daily , Disp: , Rfl:     hydrocortisone (ANUSOL-HC, PROCTOSOL HC,) 2 5 % rectal cream, Insert into the rectum 2 (two) times a day (Patient taking differently: Insert into the rectum as needed ), Disp: 30 g, Rfl: 0    linaCLOtide (Linzess) 290 MCG CAPS, Take 1 capsule by mouth daily (Patient taking differently: Take 290 mcg by mouth 2 (two) times a day ), Disp: 90 capsule, Rfl: 3   MIRENA, 52 MG, 20 MCG/24HR IUD, TO BE INSERTED ONE TIME BY PRESCRIBER  ROUTE INTRAUTERINE , Disp: , Rfl: 0    Multiple Vitamin (MULTI VITAMIN PO), Take 1 tablet by mouth daily , Disp: , Rfl:     ondansetron (ZOFRAN-ODT) 4 mg disintegrating tablet, Take 1 tablet (4 mg total) by mouth every 6 (six) hours as needed for nausea or vomiting, Disp: 30 tablet, Rfl: 0    pantoprazole (PROTONIX) 20 mg tablet, Take 1 tablet (20 mg total) by mouth daily, Disp: 60 tablet, Rfl: 0    potassium chloride (MICRO-K) 10 MEQ CR capsule, Take 10 mEq by mouth, Disp: , Rfl:     No Known Allergies    Advance Directive and Living Will: Yes      Objective:   /88 (BP Location: Left arm, Patient Position: Sitting, Cuff Size: Large)   Pulse 73   Temp 98 1 °F (36 7 °C) (Tympanic)   Resp 18   Ht 5' 10" (1 778 m)   Wt 111 kg (245 lb)   SpO2 99%   BMI 35 15 kg/m²   Wt Readings from Last 6 Encounters:   07/21/20 111 kg (245 lb)   07/02/20 109 kg (241 lb 3 2 oz)   06/30/20 111 kg (244 lb 6 4 oz)   06/19/20 113 kg (248 lb 9 6 oz)   06/17/20 113 kg (249 lb)   06/16/20 113 kg (248 lb 6 4 oz)       Physical Exam   Constitutional: She is oriented to person, place, and time  She appears well-developed and well-nourished  No distress  HENT:   Head: Normocephalic and atraumatic  Eyes: Pupils are equal, round, and reactive to light  No scleral icterus  Cardiovascular: Normal rate and regular rhythm  Pulmonary/Chest: Effort normal  No respiratory distress  Musculoskeletal: She exhibits no edema  Neurological: She is alert and oriented to person, place, and time  Skin: Skin is warm  No rash noted  No pallor  Psychiatric: She has a normal mood and affect   Thought content normal        Pertinent Laboratory Results and Imaging Review:  Lab Results   Component Value Date    IRON 57 06/10/2020    TIBC 280 06/10/2020    FERRITIN 197 06/10/2020     Lab Results   Component Value Date    WBC 7 65 06/10/2020    HGB 12 9 06/10/2020    HCT 40 3 06/10/2020    MCV 94 06/10/2020     06/10/2020     Lab Results   Component Value Date    XQKQZZRZ16 788 06/10/2020     Lab Results   Component Value Date    SODIUM 143 06/10/2020    K 3 5 06/10/2020     06/10/2020    CO2 25 06/10/2020    AGAP 12 06/10/2020    BUN 20 06/10/2020    CREATININE 0 86 06/10/2020    GLUC 67 01/30/2020    GLUF 87 06/10/2020    CALCIUM 8 9 06/10/2020    AST 16 06/10/2020    ALT 32 06/10/2020    ALKPHOS 90 06/10/2020    TP 6 7 06/10/2020    TBILI 0 40 06/10/2020    EGFR 88 06/10/2020         The following historical data was reviewed      Past Medical History:   Diagnosis Date    Abnormal Pap smear of cervix     Anxiety     ASCUS with positive high risk HPV cervical 11/6/2018    Back pain     CPAP (continuous positive airway pressure) dependence     Depressed     Femur fracture (HCC)     GERD (gastroesophageal reflux disease)     HPV (human papilloma virus) infection     Hypertension     Hx post surgery stable    Irritable bowel syndrome     Morbid obesity with BMI of 50 0-59 9, adult ([facility] [address] )     Obesity     Postgastrectomy malabsorption     Pre-diabetes     Prediabetes 10/8/2018    Overview:  Per Prediabetes protocol #1    Sleep apnea        Past Surgical History:   Procedure Laterality Date    ABDOMINAL SURGERY      Gastric Bypass, December 2019    COLPOSCOPY  9/2018    EGD      FASCIOTOMY Left     left calf    FEMUR FRACTURE SURGERY Right     [place] LAP GASTRIC BYPASS/ROWAN-EN-Y N/A 12/30/2019    Procedure: BYPASS GASTRIC  ROWAN-EN-Y LAPAROSCOPIC WITH INTRAOPERATIVE EGD;  Surgeon: Alberto Pugh MD;  Location: [facility];  Service: Bariatrics    WISDOM TOOTH EXTRACTION         Social History     Socioeconomic History    Marital status: Single     Spouse name: Not on file    Number of children: Not on file    Years of education: Not on file    Highest education level: Not on file   Occupational History    Not on file   Social Needs    Financial resource strain: Not on file    Food insecurity:     Worry: Not on file     Inability: Not on file    Transportation needs:     Medical: Not on file     Non-medical: Not on file   Tobacco Use    Smoking status: Former Smoker     Packs/day: 0 00     Years: 0 00     Pack years: 0 00     Types: Cigarettes     Last attempt to quit: 2017     Years since quitting: 3 5    Smokeless tobacco: Former User    Tobacco comment: 0 75 ppd for 2-3 years;  History of vaping    Substance and Sexual Activity    Alcohol use: Yes     Comment: social    Drug use: No    Sexual activity: Yes     Partners: Male     Birth control/protection: IUD     Comment: mirena inserted 9/25/18   Lifestyle    Physical activity:     Days per week: Not on file     Minutes per session: Not on file    Stress: Not on file   Relationships    Social connections:     Talks on phone: Not on file     Gets together: Not on file     Attends [employer] service: Not on file     Active member of club or organization: Not on file     Attends meetings of clubs or organizations: Not on file     Relationship status: Not on file    Intimate partner violence:     Fear of current or ex partner: Not on file     Emotionally abused: Not on file     Physically abused: Not on file     Forced sexual activity: Not on file   Other Topics Concern    Not on file   Social History Narrative    Lives with boyfriend and his kids (part time)     Works at [employer]        Family History   Problem Relation Age of Onset    Rheum arthritis Mother     Fibroids Mother     Obesity Mother     Depression Mother     Hypertension Mother     Substance Abuse Mother     Skin cancer Mother     Cancer Mother     Diabetes Father     Hyperlipidemia Father     Hypertension Father     Thyroid disease Father     Substance Abuse Father     Prostate cancer Father     Gout Father     Cancer Father     Depression Sister     Depression Maternal Grandmother     Diabetes Maternal Grandfather  Depression Maternal Grandfather        Please note: This report has been generated by a voice recognition software system  Therefore there may be syntax, spelling, and/or grammatical errors  Please call if you have any questions

## 2020-07-21 NOTE — PATIENT INSTRUCTIONS
Iron Rich Diet   WHAT YOU NEED TO KNOW:   An iron-rich diet includes foods that are good sources of iron  People need extra iron during childhood, adolescence (teenage years), and pregnancy  Iron is a mineral that your body needs to make hemoglobin  Hemoglobin is part of your blood and helps carry oxygen from your lungs to the rest of your body  You may need to follow this diet to treat or prevent a low blood iron level or iron deficiency anemia  DISCHARGE INSTRUCTIONS:   Daily iron needs:   · Males:      ¨ 3to 1years old: 7 mg    ¨ 3to 6years old: 10 mg    ¨ 5to 15years old: 8 mg    ¨ 15to 25years old: 11 mg    ¨ 19 years and older: 8 mg    · Females:      ¨ 3to 1years old: 7 mg    ¨ 3to 6years old: 10 mg    ¨ 5to 15years old: 8 mg    ¨ 15to 25years old: 15 mg    ¨ 19 to 50 years: 18 mg    ¨ Over 46years old: 8 mg    ¨ Pregnant women:  27 mg  Foods that contain iron:   · Meat, fish, and poultry are good sources of iron  They contain heme iron, a form of iron that your body absorbs very well  Fruit, vegetables, eggs, and grains such as pasta, rice, and cereal also contain iron  They contain nonheme iron, a form of iron that is not absorbed as well as heme iron  You can absorb more iron from these foods by eating a food that is high in vitamin C at the same time  You can also absorb more nonheme iron by eating a food from the meat, fish, and poultry group at the same time  · Fish and shellfish contain some mercury, a metal that can be harmful to the body  Children and unborn babies are at higher risk for harm caused by mercury  Children and pregnant women should avoid eating fish high in mercury, such as shark and swordfish  They should also eat only fish that are lower in mercury, such as salmon, canned light tuna, and catfish  Limit the amount of low-mercury fish and shellfish you eat to less than 12 ounces per week    Iron-rich foods:   · Foods that contain 2 mg or more per serving:      ¨ 3 ounces of cooked beef (arcelia, eye of round) or cooked turkey (dark meat)    ¨ ½ cup of beans (black, kidney, or lentil, or soybeans)    ¨ ½ cup of tofu    ¨ 1 medium baked potato    ¨ 1 cup of cooked artichoke or cooked spinach    ¨ ¾ cup of instant oatmeal    ¨ 1 cup of corn flakes    · Foods that contain 1 to 2 mg per serving:      ¨ 3 ounces of chicken    ¨ 3 ounces of pork    ¨ 3 ounces of turkey (light meat)    ¨ 3 ounces of light tuna    ¨ ½ cup of seedless, packed raisins    ¨ 1 slice of whole-wheat or white bread  Good sources of vitamin C:  Eat a serving of vitamin C with any iron-rich food to help your body absorb more iron  The following fruits and vegetables are good sources of vitamin C:  · 1 cup of fresh orange juice (124 mg) or pink grapefruit juice (83 mg)    · 1 cup of strawberries (106 mg)    · 1 cup of diced cantaloupe (68 mg)     · 1 cup of sweet yellow pepper (283 mg)    · 1 cup of fresh, boiled broccoli (116 mg) or cooked brussels sprouts (97 mg)    · 1 cup of kale (53 mg)    · 1 cup of tomato juice (45 mg)  Other guidelines to follow:   · Tea and coffee can decrease the amount of iron that your body absorbs from iron-rich foods  Drink coffee and tea separately from meals that contain iron-rich foods  · Children over the age of 1 year only need about 24 ounces of cow's milk each day  When children drink too much milk, they may eat fewer iron-rich foods  This may cause them to have a low level of iron in their blood  Risks: If you do not eat iron-rich foods and vitamin C every day, you may have low blood iron levels  This may lead to iron deficiency anemia, especially during periods when your body needs extra iron  Iron deficiency anemia may cause problems with your child's growth and development  If you have iron deficiency anemia, you may develop other health problems     © 2017 [employer] Information is for End User's use only and may not be sold, redistributed or otherwise used for commercial purposes  All illustrations and images included in CareNotes® are the copyrighted property of [employer]  or [employer]  The above information is an  only  It is not intended as medical advice for individual conditions or treatments  Talk to your doctor, nurse or pharmacist before following any medical regimen to see if it is safe and effective for you

## 2020-08-04 DIAGNOSIS — K59.00 CONSTIPATION, UNSPECIFIED CONSTIPATION TYPE: ICD-10-CM

## 2020-08-04 RX ORDER — LINACLOTIDE 290 UG/1
290 CAPSULE, GELATIN COATED ORAL 2 TIMES DAILY
Qty: 180 CAPSULE | Refills: 3 | Status: SHIPPED | OUTPATIENT
Start: 2020-08-04 | End: 2021-04-28

## 2020-08-10 DIAGNOSIS — R11.0 NAUSEA: ICD-10-CM

## 2020-08-11 ENCOUNTER — TRANSCRIBE ORDERS (OUTPATIENT)
Dept: ADMINISTRATIVE | Facility: HOSPITAL | Age: 35
End: 2020-08-11

## 2020-08-11 ENCOUNTER — APPOINTMENT (OUTPATIENT)
Dept: LAB | Facility: HOSPITAL | Age: 35
End: 2020-08-11
Payer: COMMERCIAL

## 2020-08-11 DIAGNOSIS — Z00.00 ROUTINE GENERAL MEDICAL EXAMINATION AT A HEALTH CARE FACILITY: Primary | ICD-10-CM

## 2020-08-11 DIAGNOSIS — Z00.00 ROUTINE GENERAL MEDICAL EXAMINATION AT A HEALTH CARE FACILITY: ICD-10-CM

## 2020-08-11 DIAGNOSIS — R11.0 NAUSEA: ICD-10-CM

## 2020-08-11 LAB — HBV SURFACE AB SER-ACNC: >1000 MIU/ML

## 2020-08-11 PROCEDURE — 86706 HEP B SURFACE ANTIBODY: CPT

## 2020-08-11 PROCEDURE — 36415 COLL VENOUS BLD VENIPUNCTURE: CPT

## 2020-08-11 RX ORDER — ONDANSETRON 4 MG/1
TABLET, ORALLY DISINTEGRATING ORAL
Qty: 30 TABLET | Refills: 0 | Status: SHIPPED | OUTPATIENT
Start: 2020-08-11 | End: 2022-06-15

## 2020-08-11 RX ORDER — ONDANSETRON 4 MG/1
4 TABLET, ORALLY DISINTEGRATING ORAL EVERY 6 HOURS PRN
Qty: 30 TABLET | Refills: 3 | Status: SHIPPED | OUTPATIENT
Start: 2020-08-11 | End: 2021-01-06 | Stop reason: SDUPTHER

## 2020-09-16 ENCOUNTER — DOCUMENTATION (OUTPATIENT)
Dept: BARIATRICS | Facility: CLINIC | Age: 35
End: 2020-09-16

## 2020-09-16 NOTE — PROGRESS NOTES
Attended VIRTUAL TEAMS 9 month follow up meeting  Addressed both behavioral and dietary issues  Group discussion included the impact of tobacco use, alcohol use, psychiatric medications, relationships, body image and self esteem issues as it pertains to the weight loss  surgery patient  During group discussion, reviewed vitamin recommendations, protein recommendations, portion sizes, balancing diet to include healthy carbohydrates,  importance of exercise, and the bariatric rules for success  Overall pleased with weight loss and improved quality of life

## 2020-09-23 DIAGNOSIS — R11.0 NAUSEA: Primary | ICD-10-CM

## 2020-09-24 ENCOUNTER — HOSPITAL ENCOUNTER (OUTPATIENT)
Dept: ULTRASOUND IMAGING | Facility: CLINIC | Age: 35
Discharge: HOME | End: 2020-09-24
Payer: COMMERCIAL

## 2020-09-24 DIAGNOSIS — R11.0 NAUSEA: ICD-10-CM

## 2020-09-24 PROCEDURE — 76705 ECHO EXAM OF ABDOMEN: CPT

## 2020-09-29 ENCOUNTER — CONSULT (OUTPATIENT)
Dept: SURGERY | Facility: CLINIC | Age: 35
End: 2020-09-29
Payer: COMMERCIAL

## 2020-09-29 VITALS
DIASTOLIC BLOOD PRESSURE: 72 MMHG | WEIGHT: 231.8 LBS | HEIGHT: 70 IN | HEART RATE: 81 BPM | TEMPERATURE: 97.2 F | SYSTOLIC BLOOD PRESSURE: 126 MMHG | BODY MASS INDEX: 33.18 KG/M2

## 2020-09-29 DIAGNOSIS — K81.1 CHRONIC CHOLECYSTITIS: Primary | ICD-10-CM

## 2020-09-29 PROCEDURE — 99244 OFF/OP CNSLTJ NEW/EST MOD 40: CPT | Performed by: SURGERY

## 2020-09-29 RX ORDER — HEPARIN SODIUM 5000 [USP'U]/ML
5000 INJECTION, SOLUTION INTRAVENOUS; SUBCUTANEOUS
Status: CANCELLED | OUTPATIENT
Start: 2020-09-29 | End: 2020-09-30

## 2020-09-29 RX ORDER — ENOXAPARIN SODIUM 300 MG/3ML
40 INJECTION INTRAVENOUS; SUBCUTANEOUS
Status: CANCELLED | OUTPATIENT
Start: 2020-09-29 | End: 2020-09-30

## 2020-09-29 NOTE — PROGRESS NOTES
Consult- General Surgery   Ho Bee 29 y o  female MRN: [id number]  Unit/Bed#:  Encounter: [id number]    Assessment/Plan     Assessment:  Chronic cholecystitis  Morbid obesity  Status post gastric bypass December 2019  Plan:  The patient was found to have adenomyomatosis with multiple echogenic foci in the gallbladder wall with reveberation, in light of those findings and clinical presentation I advised the patient to undergo laparoscopic cholecystectomy with cholangiograms  I discussed the operative procedure, risks, benefits and alternatives with the patient, she understood and agreed to proceed  The patient would like to have surgery done and December  History of Present Illness     HPI:  Ho Bee is a 29 y o  female who presents to my office for evaluation of chronic cholecystitis  The patient has been dealing with constipation after gastric bypass which was performed December 2019  In addition the patient complains of diffuse abdominal pain, not related to any type of food ingestion  During the workup of the constipation and abdominal discomfort patient had an ultrasound with the findings described below, she denies having any nausea, vomiting, change in the color urine or stool  Review of Systems   Constitutional: Negative for chills and fever  HENT: Negative for nosebleeds and sore throat  Eyes: Negative for pain and discharge  Respiratory: Negative for cough and shortness of breath  Cardiovascular: Negative for chest pain and palpitations  Gastrointestinal:        As per history of present illness  Endocrine: Negative for cold intolerance and heat intolerance  Genitourinary: Negative for dysuria and hematuria  Psychiatric/Behavioral: Negative for confusion  The patient is not nervous/anxious          Historical Information   Past Medical History:   Diagnosis Date    Abnormal Pap smear of cervix     Anxiety     ASCUS with positive high risk HPV cervical 11/6/2018    Back pain     CPAP (continuous positive airway pressure) dependence     Depressed     Femur fracture (HCC)     GERD (gastroesophageal reflux disease)     HPV (human papilloma virus) infection     Hypertension     Hx post surgery stable    Irritable bowel syndrome     Morbid obesity with BMI of 50 0-59 9, adult ([address] )     Obesity     Postgastrectomy malabsorption     Pre-diabetes     Prediabetes 10/8/2018    Overview:  Per Prediabetes protocol #1    Sleep apnea      Past Surgical History:   Procedure Laterality Date    ABDOMINAL SURGERY      Gastric Bypass, December 2019    COLPOSCOPY  9/2018    EGD      FASCIOTOMY Left     left calf    FEMUR FRACTURE SURGERY Right     IA LAP GASTRIC BYPASS/ROWAN-EN-Y N/A 12/30/2019    Procedure: BYPASS GASTRIC  ROWAN-EN-Y LAPAROSCOPIC WITH INTRAOPERATIVE EGD;  Surgeon: Blayne Hinkle MD;  Location: [facility];  Service: Bariatrics    WISDOM TOOTH EXTRACTION       Social History   Social History     Substance and Sexual Activity   Alcohol Use Yes    Comment: social     Social History     Substance and Sexual Activity   Drug Use No     Social History     Tobacco Use   Smoking Status Former Smoker    Packs/day: 0 00    Years: 0 00    Pack years: 0 00    Types: Cigarettes    Last attempt to quit: 2017    Years since quitting: 3 7   Smokeless Tobacco Former User   Tobacco Comment    0 75 ppd for 2-3 years;  History of vaping      Family History: non-contributory    Meds/Allergies   all medications and allergies reviewed     Current Outpatient Medications:     acyclovir (ZOVIRAX) 400 MG tablet, as needed , Disp: , Rfl:     baclofen 20 mg tablet, as needed , Disp: , Rfl:     buPROPion (WELLBUTRIN) 75 mg tablet, 150 mg 2 (two) times a day , Disp: , Rfl:     calcium citrate-vitamin d (Calcium Citrate Chewy Bite) 500-500 MG-UNIT CHEW chewable tablet, Chew 1 tablet 3 (three) times a day, Disp: , Rfl:     dicyclomine (BENTYL) 20 mg tablet, Take 1 tablet (20 mg total) by mouth every 6 (six) hours, Disp: 30 tablet, Rfl: 3    docusate sodium (COLACE) 100 mg capsule, Take 100 mg by mouth 2 (two) times a day, Disp: , Rfl:     escitalopram (LEXAPRO) 10 mg tablet, 5 mg daily , Disp: , Rfl:     hydrocortisone (ANUSOL-HC, PROCTOSOL HC,) 2 5 % rectal cream, Insert into the rectum 2 (two) times a day (Patient taking differently: Insert into the rectum as needed ), Disp: 30 g, Rfl: 0    linaCLOtide (Linzess) 290 MCG CAPS, Take 1 capsule by mouth 2 (two) times a day, Disp: 180 capsule, Rfl: 3    MIRENA, 52 MG, 20 MCG/24HR IUD, TO BE INSERTED ONE TIME BY PRESCRIBER  ROUTE INTRAUTERINE , Disp: , Rfl: 0    Multiple Vitamin (MULTI VITAMIN PO), Take 1 tablet by mouth daily , Disp: , Rfl:     ondansetron (ZOFRAN-ODT) 4 mg disintegrating tablet, DISSOLVE ONE TABLET BY MOUTH EVERY 6 HOURS AS NEEDED FOR NAUSEA OR VOMITING, Disp: 30 tablet, Rfl: 0    ondansetron (ZOFRAN-ODT) 4 mg disintegrating tablet, Take 1 tablet (4 mg total) by mouth every 6 (six) hours as needed for nausea or vomiting, Disp: 30 tablet, Rfl: 3    pantoprazole (PROTONIX) 20 mg tablet, Take 1 tablet (20 mg total) by mouth daily, Disp: 60 tablet, Rfl: 0    potassium chloride (MICRO-K) 10 MEQ CR capsule, Take 10 mEq by mouth, Disp: , Rfl:   No Known Allergies    Objective     Current Vitals:   Blood Pressure: 126/72 (09/29/20 0916)  Pulse: 81 (09/29/20 0916)  Temperature: (!) 97 2 °F (36 2 °C) (09/29/20 0916)  Temp Source: Temporal (09/29/20 0916)  Height: 5' 10" (177 8 cm) (09/29/20 0916)  Weight - Scale: 105 kg (231 lb 12 8 oz) (09/29/20 0916)      Physical Exam  Vitals signs and nursing note reviewed  Constitutional:       General: She is not in acute distress  Appearance: She is obese  Cardiovascular:      Rate and Rhythm: Normal rate and regular rhythm  Pulmonary:      Effort: Pulmonary effort is normal  No respiratory distress  Breath sounds: Normal breath sounds     Abdominal: Palpations: Abdomen is soft  There is no mass  Tenderness: There is no abdominal tenderness  Comments: Laparoscopic surgical scars from gastric bypass  Skin:     Coloration: Skin is not jaundiced  Findings: No erythema or rash  Neurological:      Mental Status: She is alert and oriented to person, place, and time  Cranial Nerves: No cranial nerve deficit  Psychiatric:         Mood and Affect: Mood normal          Behavior: Behavior normal          Imaging: I have personally reviewed pertinent reports  Procedure: Us Right Upper Quadrant    Result Date: 9/25/2020  Narrative: RIGHT UPPER QUADRANT ULTRASOUND INDICATION:     R11 0: Nausea  COMPARISON:  None TECHNIQUE:   Real-time ultrasound of the right upper quadrant was performed with a curvilinear transducer with both volumetric sweeps and still imaging techniques  FINDINGS: PANCREAS:  Visualized portions of the pancreas are within normal limits  AORTA AND IVC:  Visualized portions are normal for patient age  LIVER: Size:  Mildly enlarged  The liver measures 18 cm in the midclavicular line  Contour:  Surface contour is smooth  Parenchyma:  Mild increased echogenicity the liver suggests mild hepatic steatosis No evidence of suspicious mass  Limited imaging of the main portal vein shows it to be patent and hepatopetal   BILIARY: There is no cholelithiasis  There is cholesterolosis  There are multiple echogenic foci with congestion reverberation artifact No intrahepatic biliary dilatation  CBD measures 5 8 mm  No choledocholithiasis  KIDNEY: Right kidney measures 10 2 x 4 1 x 5 6 cm  Within normal limits  ASCITES:   None       Impression: Adenomyomatosis of the gallbladder with the multiple echogenic foci within the gallbladder wall with the adjacent reverberation No cholelithiasis No biliary dilation Mild hepatomegaly Workstation performed: [id number]     EKG, Pathology, and Other Studies: I have personally reviewed pertinent films in PACS

## 2020-09-29 NOTE — PATIENT INSTRUCTIONS
Deep Vein Thrombosis Prevention   WHAT YOU NEED TO KNOW:   Deep vein thrombosis (DVT) is a blood clot that forms in a deep vein of the body  The deep veins in the legs, thighs, and hips are the most common sites for DVT  DVT can also occur in your arms  The clot prevents the normal flow of blood in the vein  The blood backs up and causes pain and swelling  The DVT can break into smaller pieces and travel to your lungs and cause a blockage called a pulmonary embolism  A pulmonary embolism can become life-threatening  DISCHARGE INSTRUCTIONS:   Call 911 for any of the following:   · You feel lightheaded, short of breath, and have chest pain  · You cough up blood  Return to the emergency department if:   · Your arm or leg feels warm, tender, and painful  It may look swollen and red  Contact your healthcare provider if:   · You have questions or concerns about your condition or care  Risk factors for DVT:  A DVT can happen to anybody, but certain things can increase your risk  You may be at higher risk if you have had DVT in the past  You may also be at risk if you have a family member who has had blood clots  The following conditions also increase your risk:  · Limited activity caused by bed rest, a leg cast, or sitting for long periods    · Injury to a deep vein, or surgery    · A blood disorder that makes your blood clot faster than normal, such as factor V Leiden mutation    · Age older than 60 years    · Use of hormone replacement therapy or some types of birth control medicine    · Pregnancy, and for 6 weeks after childbirth     · Cancer or heart failure     · A catheter placed in a large vein    · Smoking    · Obesity or varicose veins  Prevent DVT:   · Guidelines for everyone:      ¨ Maintain a healthy weight  Ask your healthcare provider how much you should weigh  Ask him to help you create a weight loss plan if you are overweight  ¨ Do not smoke    Nicotine and other chemicals in cigarettes and cigars can damage blood vessels and increase your risk for a DVT  Ask your healthcare provider for information if you currently smoke and need help to quit  E-cigarettes or smokeless tobacco still contain nicotine  Talk to your healthcare provider before you use these products  ¨ Move regularly if you sit for long periods of time  If you travel by car or work at a desk, move and stretch in your seat several times each hour  In an airplane, get up and walk every hour  Exercise your legs while sitting by raising and lowering your heels  Keep your toes on the floor while you do this  You can also raise and lower your toes while keeping your heels on the floor  Also tighten and release your leg muscles while sitting  ¨ Exercise regularly  to help increase your blood flow  Walking is a good low-impact exercise  Talk to your healthcare provider about the best exercise plan for you  · Guidelines for people at high risk for DVT:      ¨ Take blood thinner medicines as directed  Your healthcare provider may recommend blood thinners and other medicines to help prevent blood clots  ¨ Wear pressure stockings as directed  The stockings are tight and put pressure on your legs  This improves blood flow and helps prevent clots  Wear the stockings during the day  Do not wear them when you sleep  ¨ Elevate your legs  above the level of your heart as often as you can  This will help decrease swelling and pain  Prop your legs on pillows or blankets to keep them elevated comfortably  ¨ Get up and move as directed after surgery or an injury, or during an illness  Early and regular movement can help decrease your risk for DVT by helping to increase your blood flow  Ask your healthcare provider what type of activity you need and how often you should do it  ¨ Change body positions often if you are bedridden  Ask for help to change your position every 1 to 2 hours    Follow up with your healthcare provider as directed:  Write down your questions so you remember to ask them during your visits  © 2017 [employer] Information is for End User's use only and may not be sold, redistributed or otherwise used for commercial purposes  All illustrations and images included in CareNotes® are the copyrighted property of [employer]  or [employer]  The above information is an  only  It is not intended as medical advice for individual conditions or treatments  Talk to your doctor, nurse or pharmacist before following any medical regimen to see if it is safe and effective for you  Laparoscopic Cholecystectomy   WHAT YOU NEED TO KNOW:   Laparoscopic cholecystectomy is surgery to remove your gallbladder  DISCHARGE INSTRUCTIONS:   Medicines: You may need any of the following:  · Prescription pain medicine  helps decrease pain  Do not wait until the pain is severe before you take this medicine  · NSAIDs  decrease swelling and pain  This medicine can be bought with or without a doctor's order  This medicine can cause stomach bleeding or kidney problems in certain people  If you take blood thinner medicine, always ask your healthcare provider if NSAIDs are safe for you  Read the medicine label and follow the directions on it before using this medicine  · Take your medicine as directed  Contact your healthcare provider if you think your medicine is not helping or if you have side effects  Tell him or her if you are allergic to any medicine  Keep a list of the medicines, vitamins, and herbs you take  Include the amounts, and when and why you take them  Bring the list or the pill bottles to follow-up visits  Carry your medicine list with you in case of an emergency  Follow up with your healthcare provider 2 weeks after surgery, or as directed:  Write down your questions so you remember to ask them during your visits    Wound care:  Care for your surgical wounds as directed  Keep the wounds clean and dry  You may take a shower the day after your surgery  What to eat after surgery:  Eat low-fat foods for 4 to 6 weeks while your body learns to digest fat without a gallbladder  Slowly increase the amount of fat that you eat  Drink plenty of liquids  Ask how much liquid to drink and which liquids are best for you  When to return to work and other activities: You may return to work or other activities as soon as your pain is controlled and you feel comfortable  For many people, this is 5 to 7 days after surgery  Contact your healthcare provider if:   · You have a fever over 101°F (38°C) or chills  · You have pain or nausea that is not relieved by medicine  · You have redness and swelling around your incisions, or blood or pus is leaking from your incisions  · You are constipated or have diarrhea  · Your skin or eyes are yellow, or your bowel movements are pale  · You have questions or concerns about your surgery, condition, or care  Seek care immediately or call 911 if:   · You cannot stop vomiting  · Your bowel movements are black or bloody  · You have pain in your abdomen and it is swollen or hard  · Your arm or leg feels warm, tender, and painful  It may look swollen and red  · You feel lightheaded, short of breath, and have chest pain  · You cough up blood  © 2017 [employer] Information is for End User's use only and may not be sold, redistributed or otherwise used for commercial purposes  All illustrations and images included in CareNotes® are the copyrighted property of [employer]  or [employer]  The above information is an  only  It is not intended as medical advice for individual conditions or treatments  Talk to your doctor, nurse or pharmacist before following any medical regimen to see if it is safe and effective for you    Cholecystitis   WHAT YOU NEED TO KNOW:   Cholecystitis is inflammation of your gallbladder  Your gallbladder stores bile, which helps break down the fat that you eat  Your gallbladder also helps remove certain chemicals from your body  You may have a sudden, severe attack (acute cholecystitis) or several mild attacks (chronic cholecystitis)  DISCHARGE INSTRUCTIONS:   Call 911 for any of the following:   · You have chest pain or trouble breathing  Return to the emergency department if:   · You have severe pain in your abdomen  · You urinate less than usual   Contact your healthcare provider if:   · You have a fever or chills  · You have pain when you urinate  · Your skin or eyes turn yellow  · You have questions or concerns about your condition or care  Medicines: You may need any of the following:  · Antibiotics  treat a bacterial infection  · Prescription pain medicine  may be given  Ask your healthcare provider how to take this medicine safely  Some prescription pain medicines contain acetaminophen  Do not take other medicines that contain acetaminophen without talking to your healthcare provider  Too much acetaminophen may cause liver damage  Prescription pain medicine may cause constipation  Ask your healthcare provider how to prevent or treat constipation  · NSAIDs , such as ibuprofen, help decrease swelling, pain, and fever  This medicine is available with or without a doctor's order  NSAIDs can cause stomach bleeding or kidney problems in certain people  If you take blood thinner medicine, always ask your healthcare provider if NSAIDs are safe for you  Always read the medicine label and follow directions  · Take your medicine as directed  Contact your healthcare provider if you think your medicine is not helping or if you have side effects  Tell him of her if you are allergic to any medicine  Keep a list of the medicines, vitamins, and herbs you take  Include the amounts, and when and why you take them   Bring the list or the pill bottles to follow-up visits  Carry your medicine list with you in case of an emergency  Eat a variety of healthy foods: This may decrease your symptoms  Healthy foods include fruit, vegetables, whole-grain breads, low-fat dairy products, beans, lean meat, and fish  Ask if you need to be on a special diet  Follow up with your healthcare provider as directed:  Write down your questions so you remember to ask them during your visits  © 2017 [employer]  Information is for End User's use only and may not be sold, redistributed or otherwise used for commercial purposes  All illustrations and images included in CareNotes® are the copyrighted property of [employer]  or [employer]  The above information is an  only  It is not intended as medical advice for individual conditions or treatments  Talk to your doctor, nurse or pharmacist before following any medical regimen to see if it is safe and effective for you

## 2020-10-01 ENCOUNTER — TELEPHONE (OUTPATIENT)
Dept: SURGERY | Facility: CLINIC | Age: 35
End: 2020-10-01

## 2020-10-01 ENCOUNTER — TREATMENT (OUTPATIENT)
Dept: SURGERY | Facility: CLINIC | Age: 35
End: 2020-10-01

## 2020-10-01 DIAGNOSIS — Z86.711 HISTORY OF PULMONARY EMBOLUS (PE): Primary | ICD-10-CM

## 2020-10-08 ENCOUNTER — HOSPITAL ENCOUNTER (OUTPATIENT)
Dept: RADIOLOGY | Facility: MEDICAL CENTER | Age: 35
Discharge: HOME | End: 2020-10-08
Payer: COMMERCIAL

## 2020-10-08 ENCOUNTER — OFFICE VISIT (OUTPATIENT)
Dept: FAMILY MEDICINE CLINIC | Facility: CLINIC | Age: 35
End: 2020-10-08
Payer: COMMERCIAL

## 2020-10-08 VITALS
WEIGHT: 228 LBS | HEIGHT: 70 IN | SYSTOLIC BLOOD PRESSURE: 118 MMHG | OXYGEN SATURATION: 99 % | DIASTOLIC BLOOD PRESSURE: 82 MMHG | HEART RATE: 86 BPM | BODY MASS INDEX: 32.64 KG/M2 | TEMPERATURE: 98.2 F

## 2020-10-08 DIAGNOSIS — Z86.711 HISTORY OF PULMONARY EMBOLUS (PE): ICD-10-CM

## 2020-10-08 DIAGNOSIS — K59.00 CONSTIPATION, UNSPECIFIED CONSTIPATION TYPE: ICD-10-CM

## 2020-10-08 DIAGNOSIS — K91.2 POSTSURGICAL MALABSORPTION: ICD-10-CM

## 2020-10-08 DIAGNOSIS — F33.42 RECURRENT MAJOR DEPRESSIVE DISORDER, IN FULL REMISSION (HCC): ICD-10-CM

## 2020-10-08 DIAGNOSIS — G47.33 OBSTRUCTIVE SLEEP APNEA: ICD-10-CM

## 2020-10-08 DIAGNOSIS — F41.9 ANXIETY: Primary | ICD-10-CM

## 2020-10-08 PROCEDURE — 93970 EXTREMITY STUDY: CPT

## 2020-10-08 PROCEDURE — 93971 EXTREMITY STUDY: CPT | Performed by: SURGERY

## 2020-10-08 PROCEDURE — 99203 OFFICE O/P NEW LOW 30 MIN: CPT | Performed by: FAMILY MEDICINE

## 2020-10-12 DIAGNOSIS — Z01.818 PRE-OP TESTING: Primary | ICD-10-CM

## 2020-10-13 ENCOUNTER — LAB (OUTPATIENT)
Dept: LAB | Facility: HOSPITAL | Age: 35
End: 2020-10-13
Attending: SURGERY
Payer: COMMERCIAL

## 2020-10-13 DIAGNOSIS — K81.1 CHRONIC CHOLECYSTITIS: ICD-10-CM

## 2020-10-13 LAB
ALBUMIN SERPL BCP-MCNC: 3.3 G/DL (ref 3.5–5)
ALP SERPL-CCNC: 78 U/L (ref 46–116)
ALT SERPL W P-5'-P-CCNC: 26 U/L (ref 12–78)
ANION GAP SERPL CALCULATED.3IONS-SCNC: 8 MMOL/L (ref 4–13)
AST SERPL W P-5'-P-CCNC: 11 U/L (ref 5–45)
BILIRUB SERPL-MCNC: 0.5 MG/DL (ref 0.2–1)
BUN SERPL-MCNC: 18 MG/DL (ref 5–25)
CALCIUM ALBUM COR SERPL-MCNC: 9.1 MG/DL (ref 8.3–10.1)
CALCIUM SERPL-MCNC: 8.5 MG/DL (ref 8.3–10.1)
CHLORIDE SERPL-SCNC: 107 MMOL/L (ref 100–108)
CO2 SERPL-SCNC: 27 MMOL/L (ref 21–32)
CREAT SERPL-MCNC: 0.82 MG/DL (ref 0.6–1.3)
ERYTHROCYTE [DISTWIDTH] IN BLOOD BY AUTOMATED COUNT: 12.2 % (ref 11.6–15.1)
GFR SERPL CREATININE-BSD FRML MDRD: 94 ML/MIN/1.73SQ M
GLUCOSE P FAST SERPL-MCNC: 81 MG/DL (ref 65–99)
HCT VFR BLD AUTO: 39.4 % (ref 34.8–46.1)
HGB BLD-MCNC: 13.1 G/DL (ref 11.5–15.4)
MCH RBC QN AUTO: 31 PG (ref 26.8–34.3)
MCHC RBC AUTO-ENTMCNC: 33.2 G/DL (ref 31.4–37.4)
MCV RBC AUTO: 93 FL (ref 82–98)
PLATELET # BLD AUTO: 238 THOUSANDS/UL (ref 149–390)
PMV BLD AUTO: 10.5 FL (ref 8.9–12.7)
POTASSIUM SERPL-SCNC: 3.5 MMOL/L (ref 3.5–5.3)
PROT SERPL-MCNC: 6.3 G/DL (ref 6.4–8.2)
RBC # BLD AUTO: 4.23 MILLION/UL (ref 3.81–5.12)
SODIUM SERPL-SCNC: 142 MMOL/L (ref 136–145)
WBC # BLD AUTO: 7.94 THOUSAND/UL (ref 4.31–10.16)

## 2020-10-13 PROCEDURE — 36415 COLL VENOUS BLD VENIPUNCTURE: CPT

## 2020-10-13 PROCEDURE — 80053 COMPREHEN METABOLIC PANEL: CPT

## 2020-10-13 PROCEDURE — 85027 COMPLETE CBC AUTOMATED: CPT

## 2020-10-14 ENCOUNTER — TELEPHONE (OUTPATIENT)
Dept: PSYCHIATRY | Facility: CLINIC | Age: 35
End: 2020-10-14

## 2020-10-20 ENCOUNTER — APPOINTMENT (OUTPATIENT)
Dept: RADIOLOGY | Facility: HOSPITAL | Age: 35
End: 2020-10-20
Payer: COMMERCIAL

## 2020-10-20 ENCOUNTER — ANESTHESIA EVENT (OUTPATIENT)
Dept: PERIOP | Facility: HOSPITAL | Age: 35
End: 2020-10-20
Payer: COMMERCIAL

## 2020-10-20 ENCOUNTER — ANESTHESIA (OUTPATIENT)
Dept: PERIOP | Facility: HOSPITAL | Age: 35
End: 2020-10-20
Payer: COMMERCIAL

## 2020-10-20 ENCOUNTER — HOSPITAL ENCOUNTER (OUTPATIENT)
Facility: HOSPITAL | Age: 35
Setting detail: OUTPATIENT SURGERY
Discharge: HOME | End: 2020-10-20
Attending: SURGERY | Admitting: SURGERY
Payer: COMMERCIAL

## 2020-10-20 VITALS
TEMPERATURE: 96.6 F | DIASTOLIC BLOOD PRESSURE: 57 MMHG | SYSTOLIC BLOOD PRESSURE: 114 MMHG | BODY MASS INDEX: 32.92 KG/M2 | OXYGEN SATURATION: 100 % | RESPIRATION RATE: 22 BRPM | HEIGHT: 70 IN | HEART RATE: 72 BPM | WEIGHT: 229.94 LBS

## 2020-10-20 VITALS — HEART RATE: 66 BPM

## 2020-10-20 DIAGNOSIS — D13.5 ADENOMYOMATOSIS OF GALLBLADDER: Primary | Chronic | ICD-10-CM

## 2020-10-20 DIAGNOSIS — Z01.818 PRE-OP TESTING: ICD-10-CM

## 2020-10-20 DIAGNOSIS — K81.1 CHRONIC CHOLECYSTITIS: ICD-10-CM

## 2020-10-20 PROCEDURE — 74300 X-RAY BILE DUCTS/PANCREAS: CPT

## 2020-10-20 PROCEDURE — 47563 LAPARO CHOLECYSTECTOMY/GRAPH: CPT | Performed by: SURGERY

## 2020-10-20 PROCEDURE — 88304 TISSUE EXAM BY PATHOLOGIST: CPT | Performed by: PATHOLOGY

## 2020-10-20 PROCEDURE — 47563 LAPARO CHOLECYSTECTOMY/GRAPH: CPT | Performed by: PHYSICIAN ASSISTANT

## 2020-10-20 PROCEDURE — C1729 CATH, DRAINAGE: HCPCS | Performed by: SURGERY

## 2020-10-20 RX ORDER — SODIUM CHLORIDE, SODIUM LACTATE, POTASSIUM CHLORIDE, CALCIUM CHLORIDE 600; 310; 30; 20 MG/100ML; MG/100ML; MG/100ML; MG/100ML
125 INJECTION, SOLUTION INTRAVENOUS CONTINUOUS
Status: DISCONTINUED | OUTPATIENT
Start: 2020-10-20 | End: 2020-10-20 | Stop reason: HOSPADM

## 2020-10-20 RX ORDER — METOCLOPRAMIDE HYDROCHLORIDE 5 MG/ML
INJECTION INTRAMUSCULAR; INTRAVENOUS AS NEEDED
Status: DISCONTINUED | OUTPATIENT
Start: 2020-10-20 | End: 2020-10-20

## 2020-10-20 RX ORDER — NEOSTIGMINE METHYLSULFATE 1 MG/ML
INJECTION INTRAVENOUS AS NEEDED
Status: DISCONTINUED | OUTPATIENT
Start: 2020-10-20 | End: 2020-10-20

## 2020-10-20 RX ORDER — SODIUM CHLORIDE, SODIUM LACTATE, POTASSIUM CHLORIDE, CALCIUM CHLORIDE 600; 310; 30; 20 MG/100ML; MG/100ML; MG/100ML; MG/100ML
50 INJECTION, SOLUTION INTRAVENOUS CONTINUOUS
Status: CANCELLED | OUTPATIENT
Start: 2020-10-20

## 2020-10-20 RX ORDER — ROCURONIUM BROMIDE 10 MG/ML
INJECTION, SOLUTION INTRAVENOUS AS NEEDED
Status: DISCONTINUED | OUTPATIENT
Start: 2020-10-20 | End: 2020-10-20

## 2020-10-20 RX ORDER — MAGNESIUM HYDROXIDE 1200 MG/15ML
LIQUID ORAL AS NEEDED
Status: DISCONTINUED | OUTPATIENT
Start: 2020-10-20 | End: 2020-10-20 | Stop reason: HOSPADM

## 2020-10-20 RX ORDER — TRAMADOL HYDROCHLORIDE 50 MG/1
50 TABLET ORAL EVERY 6 HOURS PRN
Status: DISCONTINUED | OUTPATIENT
Start: 2020-10-20 | End: 2020-10-20 | Stop reason: HOSPADM

## 2020-10-20 RX ORDER — HYDROMORPHONE HCL/PF 1 MG/ML
0.5 SYRINGE (ML) INJECTION
Status: DISCONTINUED | OUTPATIENT
Start: 2020-10-20 | End: 2020-10-20 | Stop reason: HOSPADM

## 2020-10-20 RX ORDER — HEPARIN SODIUM 5000 [USP'U]/ML
5000 INJECTION, SOLUTION INTRAVENOUS; SUBCUTANEOUS
Status: DISCONTINUED | OUTPATIENT
Start: 2020-10-20 | End: 2020-10-20

## 2020-10-20 RX ORDER — MIDAZOLAM HYDROCHLORIDE 2 MG/2ML
INJECTION, SOLUTION INTRAMUSCULAR; INTRAVENOUS AS NEEDED
Status: DISCONTINUED | OUTPATIENT
Start: 2020-10-20 | End: 2020-10-20

## 2020-10-20 RX ORDER — GLYCOPYRROLATE 0.2 MG/ML
INJECTION INTRAMUSCULAR; INTRAVENOUS AS NEEDED
Status: DISCONTINUED | OUTPATIENT
Start: 2020-10-20 | End: 2020-10-20

## 2020-10-20 RX ORDER — SODIUM CHLORIDE, SODIUM LACTATE, POTASSIUM CHLORIDE, CALCIUM CHLORIDE 600; 310; 30; 20 MG/100ML; MG/100ML; MG/100ML; MG/100ML
INJECTION, SOLUTION INTRAVENOUS CONTINUOUS PRN
Status: DISCONTINUED | OUTPATIENT
Start: 2020-10-20 | End: 2020-10-20

## 2020-10-20 RX ORDER — FENTANYL CITRATE/PF 50 MCG/ML
50 SYRINGE (ML) INJECTION
Status: DISCONTINUED | OUTPATIENT
Start: 2020-10-20 | End: 2020-10-20 | Stop reason: HOSPADM

## 2020-10-20 RX ORDER — DEXAMETHASONE SODIUM PHOSPHATE 4 MG/ML
INJECTION, SOLUTION INTRA-ARTICULAR; INTRALESIONAL; INTRAMUSCULAR; INTRAVENOUS; SOFT TISSUE AS NEEDED
Status: DISCONTINUED | OUTPATIENT
Start: 2020-10-20 | End: 2020-10-20

## 2020-10-20 RX ORDER — CEFAZOLIN SODIUM 2 G/50ML
2000 SOLUTION INTRAVENOUS
Status: COMPLETED | OUTPATIENT
Start: 2020-10-20 | End: 2020-10-20

## 2020-10-20 RX ORDER — METOCLOPRAMIDE HYDROCHLORIDE 5 MG/ML
10 INJECTION INTRAMUSCULAR; INTRAVENOUS ONCE AS NEEDED
Status: DISCONTINUED | OUTPATIENT
Start: 2020-10-20 | End: 2020-10-20 | Stop reason: HOSPADM

## 2020-10-20 RX ORDER — ONDANSETRON 2 MG/ML
4 INJECTION INTRAMUSCULAR; INTRAVENOUS ONCE AS NEEDED
Status: DISCONTINUED | OUTPATIENT
Start: 2020-10-20 | End: 2020-10-20 | Stop reason: HOSPADM

## 2020-10-20 RX ORDER — FENTANYL CITRATE 50 UG/ML
INJECTION, SOLUTION INTRAMUSCULAR; INTRAVENOUS AS NEEDED
Status: DISCONTINUED | OUTPATIENT
Start: 2020-10-20 | End: 2020-10-20

## 2020-10-20 RX ORDER — ONDANSETRON 2 MG/ML
4 INJECTION INTRAMUSCULAR; INTRAVENOUS EVERY 8 HOURS PRN
Status: DISCONTINUED | OUTPATIENT
Start: 2020-10-20 | End: 2020-10-20 | Stop reason: HOSPADM

## 2020-10-20 RX ORDER — BUPIVACAINE HYDROCHLORIDE 2.5 MG/ML
INJECTION, SOLUTION EPIDURAL; INFILTRATION; INTRACAUDAL AS NEEDED
Status: DISCONTINUED | OUTPATIENT
Start: 2020-10-20 | End: 2020-10-20 | Stop reason: HOSPADM

## 2020-10-20 RX ORDER — PROPOFOL 10 MG/ML
INJECTION, EMULSION INTRAVENOUS AS NEEDED
Status: DISCONTINUED | OUTPATIENT
Start: 2020-10-20 | End: 2020-10-20

## 2020-10-20 RX ORDER — ACETAMINOPHEN AND CODEINE PHOSPHATE 300; 30 MG/1; MG/1
1 TABLET ORAL EVERY 6 HOURS PRN
Qty: 20 TABLET | Refills: 0 | Status: SHIPPED | OUTPATIENT
Start: 2020-10-20 | End: 2020-10-30

## 2020-10-20 RX ORDER — ONDANSETRON 2 MG/ML
INJECTION INTRAMUSCULAR; INTRAVENOUS AS NEEDED
Status: DISCONTINUED | OUTPATIENT
Start: 2020-10-20 | End: 2020-10-20

## 2020-10-20 RX ADMIN — ONDANSETRON 4 MG: 2 INJECTION INTRAMUSCULAR; INTRAVENOUS at 08:33

## 2020-10-20 RX ADMIN — ROCURONIUM BROMIDE 40 MG: 10 SOLUTION INTRAVENOUS at 07:49

## 2020-10-20 RX ADMIN — SODIUM CHLORIDE, SODIUM LACTATE, POTASSIUM CHLORIDE, AND CALCIUM CHLORIDE: .6; .31; .03; .02 INJECTION, SOLUTION INTRAVENOUS at 08:42

## 2020-10-20 RX ADMIN — DEXAMETHASONE SODIUM PHOSPHATE 8 MG: 4 INJECTION, SOLUTION INTRAMUSCULAR; INTRAVENOUS at 07:49

## 2020-10-20 RX ADMIN — MIDAZOLAM HYDROCHLORIDE 2 MG: 1 INJECTION, SOLUTION INTRAMUSCULAR; INTRAVENOUS at 07:41

## 2020-10-20 RX ADMIN — SODIUM CHLORIDE, SODIUM LACTATE, POTASSIUM CHLORIDE, AND CALCIUM CHLORIDE: .6; .31; .03; .02 INJECTION, SOLUTION INTRAVENOUS at 07:46

## 2020-10-20 RX ADMIN — NEOSTIGMINE METHYLSULFATE 3 MG: 1 INJECTION, SOLUTION INTRAVENOUS at 08:35

## 2020-10-20 RX ADMIN — PROPOFOL 200 MG: 10 INJECTION, EMULSION INTRAVENOUS at 07:48

## 2020-10-20 RX ADMIN — FENTANYL CITRATE 50 MCG: 50 INJECTION, SOLUTION INTRAMUSCULAR; INTRAVENOUS at 08:41

## 2020-10-20 RX ADMIN — FENTANYL CITRATE 50 MCG: 50 INJECTION INTRAMUSCULAR; INTRAVENOUS at 09:15

## 2020-10-20 RX ADMIN — PHENYLEPHRINE HYDROCHLORIDE 100 MCG: 10 INJECTION INTRAVENOUS at 08:36

## 2020-10-20 RX ADMIN — ENOXAPARIN SODIUM 40 MG: 40 INJECTION SUBCUTANEOUS at 07:37

## 2020-10-20 RX ADMIN — CEFAZOLIN SODIUM 2000 MG: 2 SOLUTION INTRAVENOUS at 07:37

## 2020-10-20 RX ADMIN — PHENYLEPHRINE HYDROCHLORIDE 100 MCG: 10 INJECTION INTRAVENOUS at 08:11

## 2020-10-20 RX ADMIN — GLYCOPYRROLATE 0.6 MG: 0.2 INJECTION, SOLUTION INTRAMUSCULAR; INTRAVENOUS at 08:35

## 2020-10-20 RX ADMIN — METOCLOPRAMIDE HYDROCHLORIDE 10 MG: 5 INJECTION INTRAMUSCULAR; INTRAVENOUS at 08:33

## 2020-10-20 RX ADMIN — LIDOCAINE HYDROCHLORIDE 100 MG: 20 INJECTION, SOLUTION INTRAVENOUS at 07:48

## 2020-10-20 RX ADMIN — PHENYLEPHRINE HYDROCHLORIDE 100 MCG: 10 INJECTION INTRAVENOUS at 08:12

## 2020-10-20 RX ADMIN — FENTANYL CITRATE 100 MCG: 50 INJECTION, SOLUTION INTRAMUSCULAR; INTRAVENOUS at 07:48

## 2020-10-20 RX ADMIN — SODIUM CHLORIDE, SODIUM LACTATE, POTASSIUM CHLORIDE, AND CALCIUM CHLORIDE 125 ML/HR: .6; .31; .03; .02 INJECTION, SOLUTION INTRAVENOUS at 07:37

## 2020-10-26 ENCOUNTER — TELEPHONE (OUTPATIENT)
Dept: SURGERY | Facility: CLINIC | Age: 35
End: 2020-10-26

## 2020-10-29 ENCOUNTER — OFFICE VISIT (OUTPATIENT)
Dept: SURGERY | Facility: CLINIC | Age: 35
End: 2020-10-29

## 2020-10-29 VITALS
HEIGHT: 70 IN | DIASTOLIC BLOOD PRESSURE: 84 MMHG | SYSTOLIC BLOOD PRESSURE: 130 MMHG | TEMPERATURE: 97.2 F | WEIGHT: 226 LBS | BODY MASS INDEX: 32.35 KG/M2 | HEART RATE: 94 BPM

## 2020-10-29 DIAGNOSIS — Z48.89 POSTOPERATIVE VISIT: Primary | ICD-10-CM

## 2020-10-29 PROCEDURE — 99024 POSTOP FOLLOW-UP VISIT: CPT | Performed by: SURGERY

## 2020-11-02 ENCOUNTER — CLINICAL SUPPORT (OUTPATIENT)
Dept: FAMILY MEDICINE CLINIC | Facility: CLINIC | Age: 35
End: 2020-11-02
Payer: COMMERCIAL

## 2020-11-02 DIAGNOSIS — E53.8 B12 DEFICIENCY: Primary | ICD-10-CM

## 2020-11-02 PROCEDURE — 96372 THER/PROPH/DIAG INJ SC/IM: CPT

## 2020-11-02 RX ORDER — CYANOCOBALAMIN 1000 UG/ML
1000 INJECTION INTRAMUSCULAR; SUBCUTANEOUS
Status: DISCONTINUED | OUTPATIENT
Start: 2020-11-02 | End: 2021-03-10

## 2020-11-02 RX ADMIN — CYANOCOBALAMIN 1000 MCG: 1000 INJECTION INTRAMUSCULAR; SUBCUTANEOUS at 16:22

## 2020-11-04 DIAGNOSIS — Z11.1 SCREENING-PULMONARY TB: Primary | ICD-10-CM

## 2020-11-13 ENCOUNTER — AMB VIDEO VISIT (OUTPATIENT)
Dept: OTHER | Facility: HOSPITAL | Age: 35
End: 2020-11-13

## 2020-11-13 ENCOUNTER — NURSE TRIAGE (OUTPATIENT)
Dept: PHYSICAL THERAPY | Facility: OTHER | Age: 35
End: 2020-11-13

## 2020-11-13 ENCOUNTER — TELEPHONE (OUTPATIENT)
Dept: PHYSICAL THERAPY | Facility: OTHER | Age: 35
End: 2020-11-13

## 2020-11-13 DIAGNOSIS — M54.41 CHRONIC LOW BACK PAIN WITH RIGHT-SIDED SCIATICA, UNSPECIFIED BACK PAIN LATERALITY: Primary | ICD-10-CM

## 2020-11-13 DIAGNOSIS — G89.29 CHRONIC LOW BACK PAIN WITH RIGHT-SIDED SCIATICA, UNSPECIFIED BACK PAIN LATERALITY: Primary | ICD-10-CM

## 2020-12-01 ENCOUNTER — CLINICAL SUPPORT (OUTPATIENT)
Dept: FAMILY MEDICINE CLINIC | Facility: CLINIC | Age: 35
End: 2020-12-01
Payer: COMMERCIAL

## 2020-12-01 DIAGNOSIS — E53.8 B12 DEFICIENCY: Primary | ICD-10-CM

## 2020-12-01 RX ADMIN — CYANOCOBALAMIN 1000 MCG: 1000 INJECTION INTRAMUSCULAR; SUBCUTANEOUS at 16:17

## 2020-12-08 DIAGNOSIS — G89.29 CHRONIC LOW BACK PAIN, UNSPECIFIED BACK PAIN LATERALITY, UNSPECIFIED WHETHER SCIATICA PRESENT: Primary | ICD-10-CM

## 2020-12-08 DIAGNOSIS — M54.50 CHRONIC LOW BACK PAIN, UNSPECIFIED BACK PAIN LATERALITY, UNSPECIFIED WHETHER SCIATICA PRESENT: Primary | ICD-10-CM

## 2020-12-09 ENCOUNTER — LAB (OUTPATIENT)
Dept: LAB | Facility: HOSPITAL | Age: 35
End: 2020-12-09
Payer: COMMERCIAL

## 2020-12-09 ENCOUNTER — OFFICE VISIT (OUTPATIENT)
Dept: BARIATRICS | Facility: CLINIC | Age: 35
End: 2020-12-09
Payer: COMMERCIAL

## 2020-12-09 VITALS
HEIGHT: 70 IN | DIASTOLIC BLOOD PRESSURE: 78 MMHG | HEART RATE: 72 BPM | SYSTOLIC BLOOD PRESSURE: 122 MMHG | RESPIRATION RATE: 17 BRPM | BODY MASS INDEX: 31.04 KG/M2 | WEIGHT: 216.8 LBS

## 2020-12-09 DIAGNOSIS — K91.2 POSTSURGICAL MALABSORPTION: ICD-10-CM

## 2020-12-09 DIAGNOSIS — E61.1 LOW IRON: ICD-10-CM

## 2020-12-09 DIAGNOSIS — G47.33 OBSTRUCTIVE SLEEP APNEA: ICD-10-CM

## 2020-12-09 DIAGNOSIS — R21 RASH: ICD-10-CM

## 2020-12-09 DIAGNOSIS — F33.42 RECURRENT MAJOR DEPRESSIVE DISORDER, IN FULL REMISSION (HCC): ICD-10-CM

## 2020-12-09 DIAGNOSIS — Z48.815 ENCOUNTER FOR SURGICAL AFTERCARE FOLLOWING SURGERY OF DIGESTIVE SYSTEM: Primary | ICD-10-CM

## 2020-12-09 DIAGNOSIS — K59.00 CONSTIPATION, UNSPECIFIED CONSTIPATION TYPE: ICD-10-CM

## 2020-12-09 DIAGNOSIS — Z11.1 SCREENING-PULMONARY TB: ICD-10-CM

## 2020-12-09 DIAGNOSIS — Z48.815 ENCOUNTER FOR SURGICAL AFTERCARE FOLLOWING SURGERY OF DIGESTIVE SYSTEM: ICD-10-CM

## 2020-12-09 DIAGNOSIS — L98.7 EXCESS SKIN: ICD-10-CM

## 2020-12-09 LAB
25(OH)D3 SERPL-MCNC: 43.7 NG/ML (ref 30–100)
ALBUMIN SERPL BCP-MCNC: 3.6 G/DL (ref 3.5–5)
ALP SERPL-CCNC: 107 U/L (ref 46–116)
ALT SERPL W P-5'-P-CCNC: 60 U/L (ref 12–78)
ANION GAP SERPL CALCULATED.3IONS-SCNC: 8 MMOL/L (ref 4–13)
AST SERPL W P-5'-P-CCNC: 21 U/L (ref 5–45)
BASOPHILS # BLD AUTO: 0.02 THOUSANDS/ΜL (ref 0–0.1)
BASOPHILS NFR BLD AUTO: 0 % (ref 0–1)
BILIRUB SERPL-MCNC: 0.4 MG/DL (ref 0.2–1)
BUN SERPL-MCNC: 16 MG/DL (ref 5–25)
CALCIUM SERPL-MCNC: 8.9 MG/DL (ref 8.3–10.1)
CHLORIDE SERPL-SCNC: 109 MMOL/L (ref 100–108)
CHOLEST SERPL-MCNC: 114 MG/DL (ref 50–200)
CO2 SERPL-SCNC: 30 MMOL/L (ref 21–32)
CREAT SERPL-MCNC: 0.86 MG/DL (ref 0.6–1.3)
EOSINOPHIL # BLD AUTO: 0.15 THOUSAND/ΜL (ref 0–0.61)
EOSINOPHIL NFR BLD AUTO: 2 % (ref 0–6)
ERYTHROCYTE [DISTWIDTH] IN BLOOD BY AUTOMATED COUNT: 12.6 % (ref 11.6–15.1)
EST. AVERAGE GLUCOSE BLD GHB EST-MCNC: 100 MG/DL
FERRITIN SERPL-MCNC: 200 NG/ML (ref 8–388)
FOLATE SERPL-MCNC: >20 NG/ML (ref 3.1–17.5)
GFR SERPL CREATININE-BSD FRML MDRD: 88 ML/MIN/1.73SQ M
GLUCOSE P FAST SERPL-MCNC: 85 MG/DL (ref 65–99)
HBA1C MFR BLD: 5.1 %
HCT VFR BLD AUTO: 41.1 % (ref 34.8–46.1)
HDLC SERPL-MCNC: 53 MG/DL
HGB BLD-MCNC: 13.3 G/DL (ref 11.5–15.4)
IMM GRANULOCYTES # BLD AUTO: 0.03 THOUSAND/UL (ref 0–0.2)
IMM GRANULOCYTES NFR BLD AUTO: 0 % (ref 0–2)
IRON SATN MFR SERPL: 32 %
IRON SERPL-MCNC: 94 UG/DL (ref 50–170)
LDLC SERPL CALC-MCNC: 53 MG/DL (ref 0–100)
LYMPHOCYTES # BLD AUTO: 3.03 THOUSANDS/ΜL (ref 0.6–4.47)
LYMPHOCYTES NFR BLD AUTO: 35 % (ref 14–44)
MCH RBC QN AUTO: 30.3 PG (ref 26.8–34.3)
MCHC RBC AUTO-ENTMCNC: 32.4 G/DL (ref 31.4–37.4)
MCV RBC AUTO: 94 FL (ref 82–98)
MONOCYTES # BLD AUTO: 0.43 THOUSAND/ΜL (ref 0.17–1.22)
MONOCYTES NFR BLD AUTO: 5 % (ref 4–12)
NEUTROPHILS # BLD AUTO: 5 THOUSANDS/ΜL (ref 1.85–7.62)
NEUTS SEG NFR BLD AUTO: 58 % (ref 43–75)
NONHDLC SERPL-MCNC: 61 MG/DL
NRBC BLD AUTO-RTO: 0 /100 WBCS
PLATELET # BLD AUTO: 257 THOUSANDS/UL (ref 149–390)
PMV BLD AUTO: 10.1 FL (ref 8.9–12.7)
POTASSIUM SERPL-SCNC: 4.2 MMOL/L (ref 3.5–5.3)
PROT SERPL-MCNC: 6.7 G/DL (ref 6.4–8.2)
PTH-INTACT SERPL-MCNC: 40.3 PG/ML (ref 18.4–80.1)
RBC # BLD AUTO: 4.39 MILLION/UL (ref 3.81–5.12)
SODIUM SERPL-SCNC: 147 MMOL/L (ref 136–145)
TIBC SERPL-MCNC: 294 UG/DL (ref 250–450)
TRIGL SERPL-MCNC: 38 MG/DL
VIT B12 SERPL-MCNC: 703 PG/ML (ref 100–900)
WBC # BLD AUTO: 8.66 THOUSAND/UL (ref 4.31–10.16)

## 2020-12-09 PROCEDURE — 80061 LIPID PANEL: CPT

## 2020-12-09 PROCEDURE — 83550 IRON BINDING TEST: CPT

## 2020-12-09 PROCEDURE — 99214 OFFICE O/P EST MOD 30 MIN: CPT | Performed by: PHYSICIAN ASSISTANT

## 2020-12-09 PROCEDURE — 84630 ASSAY OF ZINC: CPT

## 2020-12-09 PROCEDURE — 86480 TB TEST CELL IMMUN MEASURE: CPT

## 2020-12-09 PROCEDURE — 82728 ASSAY OF FERRITIN: CPT

## 2020-12-09 PROCEDURE — 83970 ASSAY OF PARATHORMONE: CPT

## 2020-12-09 PROCEDURE — 84590 ASSAY OF VITAMIN A: CPT

## 2020-12-09 PROCEDURE — 85025 COMPLETE CBC W/AUTO DIFF WBC: CPT

## 2020-12-09 PROCEDURE — 82306 VITAMIN D 25 HYDROXY: CPT

## 2020-12-09 PROCEDURE — 84425 ASSAY OF VITAMIN B-1: CPT

## 2020-12-09 PROCEDURE — 36415 COLL VENOUS BLD VENIPUNCTURE: CPT

## 2020-12-09 PROCEDURE — 82746 ASSAY OF FOLIC ACID SERUM: CPT

## 2020-12-09 PROCEDURE — 83036 HEMOGLOBIN GLYCOSYLATED A1C: CPT

## 2020-12-09 PROCEDURE — 80053 COMPREHEN METABOLIC PANEL: CPT

## 2020-12-09 PROCEDURE — 82607 VITAMIN B-12: CPT

## 2020-12-09 PROCEDURE — 83540 ASSAY OF IRON: CPT

## 2020-12-09 RX ORDER — SENNA PLUS 8.6 MG/1
1 TABLET ORAL
COMMUNITY
Start: 2020-10-27 | End: 2021-01-07 | Stop reason: ALTCHOICE

## 2020-12-09 RX ORDER — OXYCODONE HYDROCHLORIDE 5 MG/1
TABLET ORAL
COMMUNITY
Start: 2020-10-27 | End: 2021-01-06 | Stop reason: ALTCHOICE

## 2020-12-09 RX ORDER — ACETAMINOPHEN 500 MG
TABLET ORAL
COMMUNITY
Start: 2020-10-27 | End: 2021-04-28

## 2020-12-09 RX ORDER — AMOXICILLIN AND CLAVULANATE POTASSIUM 875; 125 MG/1; MG/1
TABLET, FILM COATED ORAL
COMMUNITY
Start: 2020-10-27 | End: 2021-01-06 | Stop reason: ALTCHOICE

## 2020-12-10 ENCOUNTER — TELEPHONE (OUTPATIENT)
Dept: GASTROENTEROLOGY | Facility: CLINIC | Age: 35
End: 2020-12-10

## 2020-12-10 DIAGNOSIS — N39.0 URINARY TRACT INFECTION WITHOUT HEMATURIA, SITE UNSPECIFIED: Primary | ICD-10-CM

## 2020-12-10 LAB
GAMMA INTERFERON BACKGROUND BLD IA-ACNC: 0.02 IU/ML
M TB IFN-G BLD-IMP: NEGATIVE
M TB IFN-G CD4+ BCKGRND COR BLD-ACNC: 0 IU/ML
M TB IFN-G CD4+ BCKGRND COR BLD-ACNC: 0.01 IU/ML
MITOGEN IGNF BCKGRD COR BLD-ACNC: >10 IU/ML

## 2020-12-10 RX ORDER — CIPROFLOXACIN 500 MG/1
500 TABLET, FILM COATED ORAL EVERY 12 HOURS SCHEDULED
Qty: 6 TABLET | Refills: 0 | Status: SHIPPED | OUTPATIENT
Start: 2020-12-10 | End: 2020-12-13

## 2020-12-12 LAB — VIT B1 BLD-SCNC: 189.6 NMOL/L (ref 66.5–200)

## 2020-12-13 LAB — ZINC SERPL-MCNC: 82 UG/DL (ref 56–134)

## 2020-12-14 LAB — VIT A SERPL-MCNC: 50.6 UG/DL (ref 18.9–57.3)

## 2020-12-16 ENCOUNTER — OFFICE VISIT (OUTPATIENT)
Dept: BARIATRICS | Facility: CLINIC | Age: 35
End: 2020-12-16

## 2020-12-16 VITALS — WEIGHT: 218 LBS | HEIGHT: 70 IN | BODY MASS INDEX: 31.21 KG/M2

## 2020-12-16 DIAGNOSIS — K91.2 POSTSURGICAL MALABSORPTION: Primary | ICD-10-CM

## 2020-12-16 PROCEDURE — RECHECK

## 2020-12-23 ENCOUNTER — IMMUNIZATIONS (OUTPATIENT)
Dept: FAMILY MEDICINE CLINIC | Facility: HOSPITAL | Age: 35
End: 2020-12-23
Payer: COMMERCIAL

## 2020-12-23 DIAGNOSIS — Z23 ENCOUNTER FOR IMMUNIZATION: ICD-10-CM

## 2020-12-23 PROCEDURE — 91301 SARS-COV-2 / COVID-19 MRNA VACCINE (MODERNA) 100 MCG: CPT

## 2020-12-23 PROCEDURE — 0011A SARS-COV-2 / COVID-19 MRNA VACCINE (MODERNA) 100 MCG: CPT

## 2021-01-06 ENCOUNTER — OFFICE VISIT (OUTPATIENT)
Dept: FAMILY MEDICINE CLINIC | Facility: CLINIC | Age: 36
End: 2021-01-06
Payer: COMMERCIAL

## 2021-01-06 VITALS
HEART RATE: 83 BPM | SYSTOLIC BLOOD PRESSURE: 120 MMHG | HEIGHT: 70 IN | DIASTOLIC BLOOD PRESSURE: 60 MMHG | BODY MASS INDEX: 31.12 KG/M2 | RESPIRATION RATE: 16 BRPM | WEIGHT: 217.4 LBS | OXYGEN SATURATION: 95 % | TEMPERATURE: 97.1 F

## 2021-01-06 DIAGNOSIS — F33.42 RECURRENT MAJOR DEPRESSIVE DISORDER, IN FULL REMISSION (HCC): Primary | ICD-10-CM

## 2021-01-06 DIAGNOSIS — G47.00 INSOMNIA, UNSPECIFIED TYPE: ICD-10-CM

## 2021-01-06 PROCEDURE — 99214 OFFICE O/P EST MOD 30 MIN: CPT | Performed by: FAMILY MEDICINE

## 2021-01-06 PROCEDURE — 96372 THER/PROPH/DIAG INJ SC/IM: CPT | Performed by: FAMILY MEDICINE

## 2021-01-06 RX ORDER — TRAZODONE HYDROCHLORIDE 50 MG/1
50 TABLET ORAL
Qty: 30 TABLET | Refills: 5 | Status: SHIPPED | OUTPATIENT
Start: 2021-01-06 | End: 2021-01-19

## 2021-01-06 RX ORDER — ESCITALOPRAM OXALATE 5 MG/1
5 TABLET ORAL DAILY
Qty: 90 TABLET | Refills: 1 | Status: SHIPPED | OUTPATIENT
Start: 2021-01-06 | End: 2021-06-16 | Stop reason: ALTCHOICE

## 2021-01-06 RX ORDER — BUPROPION HYDROCHLORIDE 75 MG/1
75 TABLET ORAL DAILY
Qty: 90 TABLET | Refills: 1 | Status: SHIPPED | OUTPATIENT
Start: 2021-01-06 | End: 2021-06-23 | Stop reason: ALTCHOICE

## 2021-01-06 RX ADMIN — CYANOCOBALAMIN 1000 MCG: 1000 INJECTION INTRAMUSCULAR; SUBCUTANEOUS at 16:48

## 2021-01-06 NOTE — PROGRESS NOTES
BMI Counseling: Body mass index is 31 19 kg/m²  The BMI is above normal  Nutrition recommendations include decreasing portion sizes, encouraging healthy choices of fruits and vegetables, decreasing fast food intake, consuming healthier snacks, limiting drinks that contain sugar, moderation in carbohydrate intake, increasing intake of lean protein, reducing intake of saturated and trans fat and reducing intake of cholesterol  Exercise recommendations include moderate physical activity 150 minutes/week and exercising 3-5 times per week  No pharmacotherapy was ordered  Assessment/Plan:     Chronic Problems:  No problem-specific Assessment & Plan notes found for this encounter  Visit Diagnosis:  Diagnoses and all orders for this visit:    Recurrent major depressive disorder, in full remission ([facility] )  -     buPROPion (Wellbutrin) 75 mg tablet; Take 1 tablet (75 mg total) by mouth daily  -     escitalopram (LEXAPRO) 5 mg tablet; Take 1 tablet (5 mg total) by mouth daily    Insomnia, unspecified type  -     traZODone (DESYREL) 50 mg tablet; Take 1 tablet (50 mg total) by mouth daily at bedtime     Insomnia  Discussed potential causative factors, reviewed sleep hygiene practices  Reviewed previous treatments, trazodone as directed p r n  reviewed side effect profile dosing  Depression/anxiety  Stable with current regimen to be continued  B12 deficiency  Stable will continue current therapies and treatments      Subjective:    Patient ID: Cintia Finley is a 28 y o  female      F/u on  The anxiety / depression   Weight loss , after bariatric one yr ago, down 150+ , and still trying , positive outcomes   Walks during the break hrs   energy levels are fine , sleep issue, trialed melotoniin, antihistmine    nsg school   Generally doing well , dealing with the current pandemic restrict   And positive in regards to weight loss expectations   meds taken without issue , neg missed dose       The following portions of the patient's history were reviewed and updated as appropriate: allergies, current medications, past family history, past medical history, past social history, past surgical history and problem list     Review of Systems   Constitutional: Negative for appetite change, chills, fever and unexpected weight change  HENT: Negative for congestion, dental problem, ear pain, hearing loss, postnasal drip, rhinorrhea, sinus pressure, sinus pain, sneezing, sore throat, tinnitus and voice change  Eyes: Negative for visual disturbance  Respiratory: Negative for apnea, cough, chest tightness and shortness of breath  Cardiovascular: Negative for chest pain, palpitations and leg swelling  Gastrointestinal: Negative for abdominal pain, blood in stool, constipation, diarrhea, nausea and vomiting  Endocrine: Negative for cold intolerance, heat intolerance, polydipsia, polyphagia and polyuria  Genitourinary: Negative for decreased urine volume, difficulty urinating, dysuria, frequency and hematuria  Musculoskeletal: Negative for arthralgias, back pain, gait problem, joint swelling and myalgias  Skin: Negative for color change, rash and wound  Allergic/Immunologic: Negative for environmental allergies and food allergies  Neurological: Negative for dizziness, syncope, weakness, light-headedness, numbness and headaches  Hematological: Negative for adenopathy  Does not bruise/bleed easily  Psychiatric/Behavioral: Negative for sleep disturbance and suicidal ideas  The patient is not nervous/anxious            /60 (BP Location: Left arm, Patient Position: Sitting, Cuff Size: Adult)   Pulse 83   Temp (!) 97 1 °F (36 2 °C)   Resp 16   Ht 5' 10" (1 778 m)   Wt 98 6 kg (217 lb 6 4 oz)   SpO2 95%   BMI 31 19 kg/m²   Social History     Socioeconomic History    Marital status: /Civil Union     Spouse name: Not on file    Number of children: Not on file    Years of education: Not on file   [employer] Highest education level: Not on file   Occupational History    Not on file   Social Needs    Financial resource strain: Not on file    Food insecurity     Worry: Not on file     Inability: Not on file    Transportation needs     Medical: Not on file     Non-medical: Not on file   Tobacco Use    Smoking status: Former Smoker     Packs/day: 0 00     Years: 0 00     Pack years: 0 00     Types: Cigarettes     Quit date:      Years since quittin 0    Smokeless tobacco: Former User    Tobacco comment: 0 75 ppd for 2-3 years; History of vaping    Substance and Sexual Activity    Alcohol use: Yes     Alcohol/week: 1 0 standard drinks     Types: 1 Glasses of wine per week     Frequency: Monthly or less     Drinks per session: 3 or 4     Comment: social    Drug use: No    Sexual activity: Yes     Partners: Male     Birth control/protection: I U D       Comment: mirena inserted 18   Lifestyle    Physical activity     Days per week: Not on file     Minutes per session: Not on file    Stress: Not on file   Relationships    Social connections     Talks on phone: Not on file     Gets together: Not on file     Attends Tenriism service: Not on file     Active member of club or organization: Not on file     Attends meetings of clubs or organizations: Not on file     Relationship status: Not on file    Intimate partner violence     Fear of current or ex partner: Not on file     Emotionally abused: Not on file     Physically abused: Not on file     Forced sexual activity: Not on file   Other Topics Concern    Not on file   Social History Narrative    Lives with boyfriend and his kids (part time)     Works at [employer]      Past Medical History:   Diagnosis Date    Abnormal Pap smear of cervix     Anxiety     ASCUS with positive high risk HPV cervical 2018    Back pain     CPAP (continuous positive airway pressure) dependence     Depressed     Femur fracture ([address] )     GERD (gastroesophageal reflux disease)     HPV (human papilloma virus) infection     Hypertension     Hx post surgery stable    Irritable bowel syndrome     Morbid obesity with BMI of 50 0-59 9, adult ([facility] [address] )     Obesity     Postgastrectomy malabsorption     Pre-diabetes     Prediabetes 10/8/2018    Overview:  Per Prediabetes protocol #1    Sleep apnea      Family History   Problem Relation Age of Onset    Rheum arthritis Mother     Fibroids Mother     Obesity Mother     Depression Mother     Hypertension Mother     Substance Abuse Mother     Skin cancer Mother    Ethelyn [place] Cancer Mother     Diabetes Father     Hyperlipidemia Father     Hypertension Father     Thyroid disease Father     Substance Abuse Father     Prostate cancer Father     Gout Father     Cancer Father     Depression Sister     Depression Maternal Grandmother     Diabetes Maternal Grandfather     Depression Maternal Grandfather      Past Surgical History:   Procedure Laterality Date    ABDOMINAL SURGERY      Gastric Bypass, December 2019    CHOLECYSTECTOMY LAPAROSCOPIC N/A 10/20/2020    Procedure: CHOLECYSTECTOMY LAPAROSCOPIC W/ INTRAOP CHOLANGIOGRAM;  Surgeon: Yolanda Reinoso MD;  Location: [facility];  Service: General    COLPOSCOPY  9/2018    EGD      FASCIOTOMY Left     left calf    FEMUR FRACTURE SURGERY Right     HI LAP GASTRIC BYPASS/ROWAN-EN-Y N/A 12/30/2019    Procedure: BYPASS GASTRIC  ROWAN-EN-Y LAPAROSCOPIC WITH INTRAOPERATIVE EGD;  Surgeon: Eugenia Deluca MD;  Location: [facility];  Service: Bariatrics    WISDOM TOOTH EXTRACTION         Current Outpatient Medications:     acetaminophen (TYLENOL) 500 mg tablet, 2 caps every 8 hours for 3 days, then 1 cap every 4 hours as needed for pain  Do not exceed 3000mg acetaminophen (Tylenol) every 24 hours  , Disp: , Rfl:     acyclovir (ZOVIRAX) 400 MG tablet, as needed , Disp: , Rfl:     baclofen 20 mg tablet, as needed , Disp: , Rfl:     buPROPion (Wellbutrin) 75 mg tablet, Take 1 tablet (75 mg total) by mouth daily, Disp: 90 tablet, Rfl: 1    calcium citrate-vitamin d (Calcium Citrate Chewy Bite) 500-500 MG-UNIT CHEW chewable tablet, Chew 1 tablet 3 (three) times a day, Disp: , Rfl:     dicyclomine (BENTYL) 20 mg tablet, Take 1 tablet (20 mg total) by mouth every 6 (six) hours, Disp: 30 tablet, Rfl: 3    docusate sodium (COLACE) 100 mg capsule, Take 100 mg by mouth 2 (two) times a day, Disp: , Rfl:     escitalopram (LEXAPRO) 5 mg tablet, Take 1 tablet (5 mg total) by mouth daily, Disp: 90 tablet, Rfl: 1    hydrocortisone (ANUSOL-HC, PROCTOSOL HC,) 2 5 % rectal cream, Insert into the rectum 2 (two) times a day (Patient taking differently: Insert into the rectum as needed ), Disp: 30 g, Rfl: 0    linaCLOtide (Linzess) 290 MCG CAPS, Take 1 capsule by mouth 2 (two) times a day, Disp: 180 capsule, Rfl: 3    MIRENA, 52 MG, 20 MCG/24HR IUD, TO BE INSERTED ONE TIME BY PRESCRIBER   ROUTE INTRAUTERINE , Disp: , Rfl: 0    Multiple Vitamin (MULTI VITAMIN PO), Take 1 tablet by mouth daily , Disp: , Rfl:     ondansetron (ZOFRAN-ODT) 4 mg disintegrating tablet, DISSOLVE ONE TABLET BY MOUTH EVERY 6 HOURS AS NEEDED FOR NAUSEA OR VOMITING, Disp: 30 tablet, Rfl: 0    potassium chloride (MICRO-K) 10 MEQ CR capsule, Take 10 mEq by mouth, Disp: , Rfl:     senna (SENOKOT) 8 6 MG tablet, Take 1 tablet by mouth, Disp: , Rfl:     traZODone (DESYREL) 50 mg tablet, Take 1 tablet (50 mg total) by mouth daily at bedtime, Disp: 30 tablet, Rfl: 5    Current Facility-Administered Medications:     cyanocobalamin injection 1,000 mcg, 1,000 mcg, Intramuscular, Q30 Days, LUCRECIA Blanc, 1,000 mcg at 01/06/21 1648    No Known Allergies       Lab Review   Lab on 12/09/2020   Component Date Value    WBC 12/09/2020 8 66     RBC 12/09/2020 4 39     Hemoglobin 12/09/2020 13 3     Hematocrit 12/09/2020 41 1     MCV 12/09/2020 94     MCH 12/09/2020 30 3     MCHC 12/09/2020 32 4     RDW 12/09/2020 12 6     MPV 12/09/2020 10 1  Platelets 19/22/4952 257     nRBC 12/09/2020 0     Neutrophils Relative 12/09/2020 58     Immat GRANS % 12/09/2020 0     Lymphocytes Relative 12/09/2020 35     Monocytes Relative 12/09/2020 5     Eosinophils Relative 12/09/2020 2     Basophils Relative 12/09/2020 0     Neutrophils Absolute 12/09/2020 5 00     Immature Grans Absolute 12/09/2020 0 03     Lymphocytes Absolute 12/09/2020 3 03     Monocytes Absolute 12/09/2020 0 43     Eosinophils Absolute 12/09/2020 0 15     Basophils Absolute 12/09/2020 0 02     Sodium 12/09/2020 147*    Potassium 12/09/2020 4 2     Chloride 12/09/2020 109*    CO2 12/09/2020 30     ANION GAP 12/09/2020 8     BUN 12/09/2020 16     Creatinine 12/09/2020 0 86     Glucose, Fasting 12/09/2020 85     Calcium 12/09/2020 8 9     AST 12/09/2020 21     ALT 12/09/2020 60     Alkaline Phosphatase 12/09/2020 107     Total Protein 12/09/2020 6 7     Albumin 12/09/2020 3 6     Total Bilirubin 12/09/2020 0 40     eGFR 12/09/2020 88     Ferritin 12/09/2020 200     Folate 12/09/2020 >20 0*    Iron Saturation 12/09/2020 32     TIBC 12/09/2020 294     Iron 12/09/2020 94     Hemoglobin A1C 12/09/2020 5 1     EAG 12/09/2020 100     Zinc 12/09/2020 82     Vit D, 25-Hydroxy 12/09/2020 43 7     Vitamin B-12 12/09/2020 703     Vitamin B1, Whole Blood 12/09/2020 189 6     Vitamin A 12/09/2020 50 6     PTH 12/09/2020 40 3     Cholesterol 12/09/2020 114     Triglycerides 12/09/2020 38     HDL, Direct 12/09/2020 53     LDL Calculated 12/09/2020 53     Non-HDL-Chol (CHOL-HDL) 12/09/2020 61     QFT Nil 12/09/2020 0 02     QFT TB1-NIL 12/09/2020 0 01     QFT TB2-NIL 12/09/2020 0 00     QFT Mitogen-NIL 12/09/2020 >10 00     QFT Final Interpretation 12/09/2020 Negative    Admission on 10/20/2020, Discharged on 10/20/2020   Component Date Value    Case Report 10/20/2020                      Value:Surgical Pathology Report                         Case: [id number] Authorizing Provider:  Marco Merida MD         Collected:           10/20/2020 0805              Ordering Location:     [facility] Received:            10/20/2020 [address]                                     Operating Room                                                               Pathologist:           Vickie Tobin DO                                                     Specimen:    Gallbladder                                                                                Final Diagnosis 10/20/2020                      Value: This result contains rich text formatting which cannot be displayed here   Additional Information 10/20/2020                      Value: This result contains rich text formatting which cannot be displayed here  Segura Gross Description 10/20/2020                      Value: This result contains rich text formatting which cannot be displayed here  Lab on 10/13/2020   Component Date Value    WBC 10/13/2020 7 94     RBC 10/13/2020 4 23     Hemoglobin 10/13/2020 13 1     Hematocrit 10/13/2020 39 4     MCV 10/13/2020 93     MCH 10/13/2020 31 0     MCHC 10/13/2020 33 2     RDW 10/13/2020 12 2     Platelets 62/42/1826 238     MPV 10/13/2020 10 5     Sodium 10/13/2020 142     Potassium 10/13/2020 3 5     Chloride 10/13/2020 107     CO2 10/13/2020 27     ANION GAP 10/13/2020 8     BUN 10/13/2020 18     Creatinine 10/13/2020 0 82     Glucose, Fasting 10/13/2020 81     Calcium 10/13/2020 8 5     Corrected Calcium 10/13/2020 9 1     AST 10/13/2020 11     ALT 10/13/2020 26     Alkaline Phosphatase 10/13/2020 78     Total Protein 10/13/2020 6 3*    Albumin 10/13/2020 3 3*    Total Bilirubin 10/13/2020 0 50     eGFR 10/13/2020 94    Appointment on 08/11/2020   Component Date Value    Hep B S Ab 08/11/2020 >1,000 00         Imaging: No results found      Objective:     Physical Exam  Constitutional:       General: She is not in acute distress  Appearance: She is well-developed  She is not ill-appearing or toxic-appearing  HENT:      Head: Normocephalic and atraumatic  Neck:      Musculoskeletal: Normal range of motion and neck supple  Vascular: No carotid bruit  Cardiovascular:      Rate and Rhythm: Normal rate and regular rhythm  Heart sounds: Normal heart sounds  Musculoskeletal: Normal range of motion  Right lower leg: No edema  Left lower leg: No edema  Lymphadenopathy:      Cervical: No cervical adenopathy  Skin:     General: Skin is warm and dry  Neurological:      Mental Status: She is alert and oriented to person, place, and time  Deep Tendon Reflexes: Reflexes are normal and symmetric  Psychiatric:         Behavior: Behavior normal          Thought Content: Thought content normal          Judgment: Judgment normal            There are no Patient Instructions on file for this visit  LUCRECIA Wang    Portions of the record may have been created with voice recognition software  Occasional wrong word or "sound a like" substitutions may have occurred due to the inherent limitations of voice recognition software  Read the chart carefully and recognize, using context, where substitutions have occurred

## 2021-01-07 ENCOUNTER — ANNUAL EXAM (OUTPATIENT)
Dept: OBGYN CLINIC | Facility: CLINIC | Age: 36
End: 2021-01-07
Payer: COMMERCIAL

## 2021-01-07 VITALS — SYSTOLIC BLOOD PRESSURE: 118 MMHG | DIASTOLIC BLOOD PRESSURE: 68 MMHG | WEIGHT: 218.2 LBS | BODY MASS INDEX: 31.31 KG/M2

## 2021-01-07 DIAGNOSIS — Z01.419 ENCOUNTER FOR ANNUAL ROUTINE GYNECOLOGICAL EXAMINATION: Primary | ICD-10-CM

## 2021-01-07 DIAGNOSIS — R10.2 PELVIC PAIN: ICD-10-CM

## 2021-01-07 DIAGNOSIS — Z97.5 IUD (INTRAUTERINE DEVICE) IN PLACE: ICD-10-CM

## 2021-01-07 PROCEDURE — 99395 PREV VISIT EST AGE 18-39: CPT | Performed by: NURSE PRACTITIONER

## 2021-01-07 NOTE — PATIENT INSTRUCTIONS
Intrauterine Device   WHAT YOU NEED TO KNOW:   What is an intrauterine device (IUD)? An IUD is a type of birth control that is inserted into your uterus  It is a small, flexible piece of plastic with a string on the end  It is inserted and removed by your healthcare provider  IUDs prevent sperm from reaching or fertilizing an egg  IUDs also prevent a fertilized egg from attaching to the uterus and developing into a fetus  What are the most common types of IUDs? Your healthcare provider will recommend the type of IUD that is right for you  This is based on your age and if you have had a child  If you have not had a child, a smaller IUD will be used  · A copper IUD  slowly releases a small amount of copper into your uterus  This IUD can remain in place for up to 10 years  · A hormone-releasing IUD  slowly releases a small amount of progesterone into your uterus  Progesterone is a hormone that is made by your body to help control your periods  This IUD can remain in place for 3 to 5 years  What are the advantages of an IUD? · An IUD is 98% to 99% effective in preventing pregnancy  · The IUD can be removed by your healthcare provider if you decide to have a baby  You may be able to get pregnant as soon as the IUD is removed  · An IUD protects you from pregnancy right after it is inserted  · You do not have to stop sexual activity to insert it  You do not have to remember to take your birth control pill  · Copper IUDs are safer for some women than oral birth control pills  Examples include women who smoke or have a history of blood clots  · Hormone-releasing IUDs may decrease certain health problems  Examples include bleeding and cramping that happen with your monthly period  What are the disadvantages of an IUD? · There is a small chance that you could get pregnant  Sometimes the IUD cannot be removed after you get pregnant   This increases your risk of a miscarriage or an ectopic pregnancy  Ectopic pregnancy is when the fertilized egg starts to grow somewhere other than your uterus  · An IUD does not protect you from sexually transmitted infections  · You may have cramps during the first weeks after you get the IUD  · A copper IUD may cause your monthly period to be heavier or more painful  This is more common within the first 3 months after you get the IUD  You may need to have your IUD removed if your bleeding or pain becomes severe  You may have spotting between periods  · There is a small risk of an infection within the first 20 days after the IUD is placed  Infection can lead to pelvic inflammatory disease  This can cause infertility  · Your uterus may tear when the IUD is inserted  The IUD may slip part or all of the way out of your uterus  How is the IUD inserted? · The IUD is usually inserted during your monthly period  This may help decrease the amount of discomfort you have during the procedure  It also helps make sure that you are not pregnant  You will be asked to lie down and place your feet in stirrups  Your healthcare provider will gently insert a speculum into your vagina  This is the same tool used during a Pap smear  The speculum allows your healthcare provider to see inside your vagina to your cervix  The cervix is the opening of your uterus  · Your healthcare provider will clean your cervix with an antiseptic solution to prevent infection  You may be given numbing medicine  A long plastic tube is gently passed through your cervix and into your uterus  This tube has the IUD inside of it  The IUD is pushed out of the tube and into your uterus  You may have cramps as the IUD is inserted  The tube is removed after the IUD is in place  How can I make sure my IUD is still in place? An IUD has a string made of plastic thread  One to 2 inches of this string hangs into your vagina   You cannot see this string, and it should not cause problems when you have sex  Check your IUD string every 3 days for the first 3 months after it is inserted  After that, check the string after each monthly period  Do the following to check the placement of your IUD:  · Wash your hands with soap and warm water  Dry them with a clean towel  · Bend your knees and squat low to the ground  · Gently put your index finger inside your vagina  The cervix is at the top of the vagina and feels like the tip of your nose  Feel for the IUD string  Do not pull on the string  You should not be able to feel the firm plastic of the IUD itself  · Wash your hands after you check your IUD string  Where can I find more information? · [facility] [address] , [address] [place]  Phone: 2- [phone] - [phone]  Web Address: https://[employer]    When should I seek immediate care? · You have severe pain or bleeding during your period  · You have a fever and severe abdominal pain  When should I call my doctor? · You think you are pregnant  · The IUD has come out  · You have bleeding from your vagina after you have sex, and it is not your period  · You have pain during sex  · You cannot feel the IUD string, the string feels longer, or you feel the plastic of the IUD itself  · You have vaginal discharge that is green, yellow, or has a foul odor  · You have questions or concerns about your condition or care  CARE AGREEMENT:   You have the right to help plan your care  Learn about your health condition and how it may be treated  Discuss treatment options with your healthcare providers to decide what care you want to receive  You always have the right to refuse treatment  The above information is an  only  It is not intended as medical advice for individual conditions or treatments  Talk to your doctor, nurse or pharmacist before following any medical regimen to see if it is safe and effective for you    © Copyright [address] Information is for [employer] use only and may not be sold, redistributed or otherwise used for commercial purposes   All illustrations and images included in CareNotes® are the copyrighted property of A D [employer] M [employer]  or [address] [place], [place] [address]antonio ethel

## 2021-01-07 NOTE — PROGRESS NOTES
Diagnoses and all orders for this visit:    1  Encounter for annual routine gynecological examination  Pap Not due, discussed new guidelines  Healthy eating and nutrition, daily exercise discussed and SBE reinforced  Call with any issues and all questions and concerns addressed  2  IUD (intrauterine device) in place/3  Pelvic pain  -     US pelvis complete w transvaginal; Future  R/O ovarian cysts which she had in the past and check IUD placement  Also discussed ovulation, which patient maybe feeling now based on timing and description of her pain  All questions and concerns answered  Patient to call with any questions  Pleasant 28 y o  nulliparous, premenopausal female here for annual exam  She denies any issues with bleeding or her menses  Reports light spotting that lasts 7-10 days for her periods,regular cycles with Mirena IUD  She can not feel her strings and remembers from last exam with me, I stated that her strings were short  She has some occasional, right sided lower pelvic pain  Had  gastric bypass and lost over 150 lbs, very happy with this! Many previous health issues improved with weight loss  Denies history of abnormal pap smears  Last Pap was in 2019, negative  Next pap due in 2024  Denies vaginal, urinary or breast issues, today  Sexually active without any concerns and pt declines STD testing  Denies F/C/N/V      Past Medical History:   Diagnosis Date    Abnormal Pap smear of cervix     Anxiety     ASCUS with positive high risk HPV cervical 11/6/2018    Back pain     CPAP (continuous positive airway pressure) dependence     Depressed     Femur fracture (HCC)     HPV (human papilloma virus) infection     Hypertension     Hx post surgery stable    Irritable bowel syndrome     Morbid obesity with BMI of 50 0-59 9, adult (HCC)     Obesity     Postgastrectomy malabsorption      Past Surgical History:   Procedure Laterality Date    ABDOMINAL SURGERY Gastric Bypass, 2019    CHOLECYSTECTOMY LAPAROSCOPIC N/A 10/20/2020    Procedure: CHOLECYSTECTOMY LAPAROSCOPIC W/ INTRAOP CHOLANGIOGRAM;  Surgeon: Marco Merida MD;  Location: [facility];  Service: General    COLPOSCOPY  2018    EGD      FASCIOTOMY Left     left calf    FEMUR FRACTURE SURGERY Right     MN LAP GASTRIC BYPASS/ROWAN-EN-Y N/A 2019    Procedure: BYPASS GASTRIC  ROWAN-EN-Y LAPAROSCOPIC WITH INTRAOPERATIVE EGD;  Surgeon: Bob Perez MD;  Location: [facility];  Service: Bariatrics    WISDOM TOOTH EXTRACTION       Family History   Problem Relation Age of Onset    Rheum arthritis Mother     Fibroids Mother     Obesity Mother     Depression Mother     Hypertension Mother     Substance Abuse Mother     Skin cancer Mother     Cancer Mother     Diabetes Father     Hyperlipidemia Father     Hypertension Father     Thyroid disease Father     Substance Abuse Father     Prostate cancer Father     Gout Father     Cancer Father     Depression Sister     Depression Maternal Grandmother     Diabetes Maternal Grandfather     Depression Maternal Grandfather     Breast cancer Neg Hx     Ovarian cancer Neg Hx     Uterine cancer Neg Hx     Cervical cancer Neg Hx      Social History     Tobacco Use    Smoking status: Former Smoker     Packs/day: 0 00     Years: 0 00     Pack years: 0 00     Types: Cigarettes     Quit date:      Years since quittin 0    Smokeless tobacco: Former User    Tobacco comment: 0 75 ppd for 2-3 years; History of vaping    Substance Use Topics    Alcohol use: Yes     Alcohol/week: 1 0 standard drinks     Types: 1 Glasses of wine per week     Frequency: Monthly or less     Drinks per session: 3 or 4     Comment: social    Drug use: No       Current Outpatient Medications:     acetaminophen (TYLENOL) 500 mg tablet, 2 caps every 8 hours for 3 days, then 1 cap every 4 hours as needed for pain   Do not exceed 3000mg acetaminophen (Tylenol) every 24 hours  , Disp: , Rfl:     acyclovir (ZOVIRAX) 400 MG tablet, as needed , Disp: , Rfl:     escitalopram (LEXAPRO) 5 mg tablet, Take 1 tablet (5 mg total) by mouth daily, Disp: 90 tablet, Rfl: 1    hydrocortisone (ANUSOL-HC, PROCTOSOL HC,) 2 5 % rectal cream, Insert into the rectum 2 (two) times a day (Patient taking differently: Insert into the rectum as needed ), Disp: 30 g, Rfl: 0    linaCLOtide (Linzess) 290 MCG CAPS, Take 1 capsule by mouth 2 (two) times a day, Disp: 180 capsule, Rfl: 3    MIRENA, 52 MG, 20 MCG/24HR IUD, TO BE INSERTED ONE TIME BY PRESCRIBER   ROUTE INTRAUTERINE , Disp: , Rfl: 0    Multiple Vitamin (MULTI VITAMIN PO), Take 1 tablet by mouth daily , Disp: , Rfl:     ondansetron (ZOFRAN-ODT) 4 mg disintegrating tablet, DISSOLVE ONE TABLET BY MOUTH EVERY 6 HOURS AS NEEDED FOR NAUSEA OR VOMITING, Disp: 30 tablet, Rfl: 0    potassium chloride (MICRO-K) 10 MEQ CR capsule, Take 10 mEq by mouth, Disp: , Rfl:     traZODone (DESYREL) 50 mg tablet, Take 1 tablet (50 mg total) by mouth daily at bedtime, Disp: 30 tablet, Rfl: 5    baclofen 20 mg tablet, as needed , Disp: , Rfl:     buPROPion (Wellbutrin) 75 mg tablet, Take 1 tablet (75 mg total) by mouth daily, Disp: 90 tablet, Rfl: 1    calcium citrate-vitamin d (Calcium Citrate Chewy Bite) 500-500 MG-UNIT CHEW chewable tablet, Chew 1 tablet 3 (three) times a day, Disp: , Rfl:     dicyclomine (BENTYL) 20 mg tablet, Take 1 tablet (20 mg total) by mouth every 6 (six) hours, Disp: 30 tablet, Rfl: 3    docusate sodium (COLACE) 100 mg capsule, Take 100 mg by mouth 2 (two) times a day, Disp: , Rfl:     Current Facility-Administered Medications:     cyanocobalamin injection 1,000 mcg, 1,000 mcg, Intramuscular, Q30 Days, LUCRECIA Blanc, 1,000 mcg at 01/06/21 1648  Patient Active Problem List    Diagnosis Date Noted    Encounter for annual routine gynecological examination 01/07/2021    IUD (intrauterine device) in place 01/07/2021   Jeremy House Adenomyomatosis of gallbladder 10/20/2020    Encounter for surgical aftercare following surgery of digestive system 2020    Postsurgical malabsorption 2020    Constipation 2020    Dehydration 2020    Morbid obesity ([facility] ) 2019    Obstructive sleep apnea 2019    Acquired genu valgum of right knee 2019    Acquired genu valgum of left knee 2019    It band syndrome, right 2019    Patellofemoral disorder of both knees 2019    IUD check up 10/25/2019    Obstructive sleep apnea (adult) (pediatric)     Recurrent major depressive disorder, in full remission ([facility] ) 2019    BMI 50 0-59 9, adult ([place] [address] ) 12/10/2018    DJD (degenerative joint disease) 2013       No Known Allergies    OB History    Para Term  AB Living   0 0 0 0 0 0   SAB TAB Ectopic Multiple Live Births   0 0 0 0 0       Vitals:    21 0704   BP: 118/68   BP Location: Right arm   Patient Position: Sitting   Cuff Size: Standard   Weight: 99 kg (218 lb 3 2 oz)     Body mass index is 31 31 kg/m²  Review of Systems   Constitutional: Negative for chills, fatigue, fever and unexpected weight change  Respiratory: Negative for shortness of breath  Gastrointestinal: Negative for anal bleeding, blood in stool, constipation and diarrhea  Genitourinary: Negative for difficulty urinating, dysuria and hematuria  OBGyn Exam    Physical Exam   Constitutional: She appears well-developed and well-nourished  No distress  Head: Normocephalic  Neck: Normal range of motion  Neck supple  Pulmonary: Effort normal   Breasts: bilateral without masses, skin changes or nipple discharge  Bilaterally soft and warm to touch  No areas of erythema or pain  Abdominal: Soft  Non-tender  Pelvic exam was performed with patient supine  No labial fusion  There is no rash, tenderness, lesion or injury on the right labia   There is no rash, tenderness, lesion or injury on the left labia  Uterus is not deviated, not enlarged, not fixed and not tender  Cervix exhibits no motion tenderness, no discharge and no friability  Right adnexum displays no mass, no tenderness and no fullness  Left adnexum displays no mass, no tenderness and no fullness  No erythema or tenderness in the vagina  No foreign body in the vagina  No signs of injury around the vagina  No vaginal discharge found  Lymphadenopathy:        Right: No inguinal adenopathy present          Left: No inguinal adenopathy present

## 2021-01-12 ENCOUNTER — TELEPHONE (OUTPATIENT)
Dept: GASTROENTEROLOGY | Facility: CLINIC | Age: 36
End: 2021-01-12

## 2021-01-12 NOTE — TELEPHONE ENCOUNTER
06/08/20 1220   Post-Acute Status   Post-Acute Authorization Other   Other Status No Post-Acute Service Needs   Discharge Delays None known at this time   Discharge Plan   Discharge Plan A Home with family   Discharge Plan B Home with family     Binta Amor RN  Case Management  Ext: [phone]  06/08/2020  12:20 PM       ----- Message from [address] [place] [address]  Natividad Trejo sent at 1/11/2021  4:03 PM EST -----  Regarding: Prescription Question  Contact: [phone]  Ellis Burks! Can I stop by and  some Linzess 290mcg samples since I am still taking it twice a day to stay regular  I am running low  Thanks!     Marisela Carl

## 2021-01-19 DIAGNOSIS — G47.00 INSOMNIA, UNSPECIFIED TYPE: ICD-10-CM

## 2021-01-19 RX ORDER — TRAZODONE HYDROCHLORIDE 100 MG/1
100 TABLET ORAL
Qty: 30 TABLET | Refills: 3 | Status: SHIPPED | OUTPATIENT
Start: 2021-01-19 | End: 2021-05-05 | Stop reason: SDUPTHER

## 2021-01-19 NOTE — PROGRESS NOTES
Weight Management Medical Nutrition Assessment  Pastor Gay was here today for meal planning  Today she weighs 219 4 lbs and has a goal weight of 160  She had bariatric surgery 1 year ago and is being followed by PAULO Lewis  She is going to follow a partial meal plan: 1 meal replacement for breakfast, 1 for lunch, 1 bar, 1 low carb snack and a sensible dinner of lean protein, and non starchy vegetables  She admits that she is not currently weighing or measuring her food, but plans to start  Also discussed the importance of regular physical activity  Patient seen by Medical Provider in past 6 months:  Yes  (PAULO Chino)  Requested to schedule appointment with Medical Provider: No      Anthropometric Measurements  Start Weight (#): 219 4 lbs  Current Weight (#): 219 4 lbs  TBW % Change from start weight:0% since starting MWM  Ideal Body Weight (#): 150 lbs  Goal Weight (#):160 lbs    Weight Loss History  Previous weight loss attempts: bariatiric surgery 1 year ago    Food and Nutrition Related History  Food Recall  Breakfast: coffee with protein powder or bar   Snack:  Lunch: yogurt and fruit or soup  Snack: granola bar  Dinner:  tortellini soup  Snack: pretzels      Beverages: water, coffee  Volume of beverage intake: 40 - 60    Weekends: Same  Cravings: salty  Trouble area of day: night time    Frequency of Eating out: irregularly  Food restrictions:none  Cooking: self   Food Shopping: self    Physical Activity Intake  Activity:Walking  Frequency:2 - 3 times per week  Physical limitations/barriers to exercise: none    Estimated Needs  Energy    Bear Magoffin Energy Needs: BMR : 4983  1-2# loss weekly sedentary:  1125 - 1625        1-2# loss weekly lightly active: 1434 - 1934  Maintenance calories for sedentary activity level: 2125  Protein: 82 - 102   (1 2-1 5g/kg IBW)  Fluid: 80 oz     (35mL/kg IBW)    Nutrition Diagnosis  Yes;     Overweight/obesity  related to Excess energy intake as evidenced by  BMI more than normative standard for age and sex (obesity-grade I 26-30  9)       Nutrition Intervention    Nutrition Prescription  Calories:1000 - 1200  Protein: 80 - 102  Fluid:80 oz    Meal Plan (Juan Pablo/Pro/Carb)  Breakfast:  200/27/10  (meal replacement)  Snack:  Lunch: 200/27/10  (meal replacement)  Snack: 100/10/0  String cheese  Dinner: 340/28/0  (lean protein, non starchy veg)  Snack: 160/15/13 (bar)    Nutrition Education:    Calorie controlled menu  Lean protein food choices  Healthy snack options  Food journaling tips      Nutrition Counseling:  Strategies: meal planning, portion sizes, healthy snack choices, hydration, fiber intake, protein intake, exercise, food journal      Monitoring and Evaluation:  Evaluation criteria:  Energy Intake  Meet protein needs  Maintain adequate hydration  Monitor weekly weight  Meal planning/preparation  Food journal   Decreased portions at mealtimes and snacks  Physical activity     Barriers to learning:none  Readiness to change: Action:  (Changing behavior)  Comprehension: good  Expected Compliance: good

## 2021-01-20 ENCOUNTER — OFFICE VISIT (OUTPATIENT)
Dept: BARIATRICS | Facility: CLINIC | Age: 36
End: 2021-01-20
Payer: COMMERCIAL

## 2021-01-20 DIAGNOSIS — R63.5 ABNORMAL WEIGHT GAIN: ICD-10-CM

## 2021-01-20 PROCEDURE — WMDI30

## 2021-01-20 PROCEDURE — RECHECK

## 2021-01-22 ENCOUNTER — IMMUNIZATIONS (OUTPATIENT)
Dept: FAMILY MEDICINE CLINIC | Facility: HOSPITAL | Age: 36
End: 2021-01-22

## 2021-01-22 DIAGNOSIS — Z23 ENCOUNTER FOR IMMUNIZATION: Primary | ICD-10-CM

## 2021-01-22 PROCEDURE — 0012A SARS-COV-2 / COVID-19 MRNA VACCINE (MODERNA) 100 MCG: CPT

## 2021-01-22 PROCEDURE — 91301 SARS-COV-2 / COVID-19 MRNA VACCINE (MODERNA) 100 MCG: CPT

## 2021-02-04 DIAGNOSIS — K59.00 CONSTIPATION, UNSPECIFIED CONSTIPATION TYPE: Primary | ICD-10-CM

## 2021-02-04 RX ORDER — PLECANATIDE 3 MG/1
1 TABLET ORAL DAILY
Qty: 30 TABLET | Refills: 5 | Status: SHIPPED | OUTPATIENT
Start: 2021-02-04 | End: 2021-08-13 | Stop reason: SDUPTHER

## 2021-02-10 ENCOUNTER — CLINICAL SUPPORT (OUTPATIENT)
Dept: FAMILY MEDICINE CLINIC | Facility: CLINIC | Age: 36
End: 2021-02-10
Payer: COMMERCIAL

## 2021-02-10 DIAGNOSIS — E53.8 B12 DEFICIENCY: Primary | ICD-10-CM

## 2021-02-10 PROCEDURE — 96372 THER/PROPH/DIAG INJ SC/IM: CPT

## 2021-02-10 RX ADMIN — CYANOCOBALAMIN 1000 MCG: 1000 INJECTION INTRAMUSCULAR; SUBCUTANEOUS at 16:27

## 2021-03-10 ENCOUNTER — OFFICE VISIT (OUTPATIENT)
Dept: BARIATRICS | Facility: CLINIC | Age: 36
End: 2021-03-10

## 2021-03-10 ENCOUNTER — TELEPHONE (OUTPATIENT)
Dept: BARIATRICS | Facility: CLINIC | Age: 36
End: 2021-03-10

## 2021-03-10 DIAGNOSIS — Z98.84 BARIATRIC SURGERY STATUS: Primary | ICD-10-CM

## 2021-03-10 DIAGNOSIS — K91.2 POSTSURGICAL MALABSORPTION: Primary | ICD-10-CM

## 2021-03-10 PROCEDURE — RECHECK: Performed by: PHYSICIAN ASSISTANT

## 2021-03-10 RX ORDER — CYANOCOBALAMIN 1000 UG/ML
1000 INJECTION INTRAMUSCULAR; SUBCUTANEOUS
Status: COMPLETED | OUTPATIENT
Start: 2021-03-10 | End: 2021-08-10

## 2021-03-10 RX ADMIN — CYANOCOBALAMIN 1000 MCG: 1000 INJECTION INTRAMUSCULAR; SUBCUTANEOUS at 13:43

## 2021-03-10 NOTE — TELEPHONE ENCOUNTER
Patient called to request that she have her monthly B12 injections with our office instead of her PCP  Last B12 in December was 703  Will continue with monthly B12 1,000mcg IM x 6 and reassess with labs

## 2021-03-22 ENCOUNTER — TELEPHONE (OUTPATIENT)
Dept: BEHAVIORAL/MENTAL HEALTH CLINIC | Facility: CLINIC | Age: 36
End: 2021-03-22

## 2021-03-31 ENCOUNTER — DOCUMENTATION (OUTPATIENT)
Dept: BARIATRICS | Facility: CLINIC | Age: 36
End: 2021-03-31

## 2021-04-02 NOTE — PROGRESS NOTES
Weight Management Nutrition Class  - Bariatric    Diagnosis: Obesity    Surgery: Gastric Bypass Laparoscopic    Class: back to basics       Attended the first Back to Basics class on the Basic Rules  Reviewed class expectations of participants ( I e   on time, complete homework, participation) Reviewed the basic rules and discussed the importance of following long term  Addressed readiness to change  Reviewed confidentially form and patient to sign and send   Pt to weigh self and email me as class Virtual   Discussed goals and challenges/barriers of following the rules

## 2021-04-09 ENCOUNTER — CLINICAL SUPPORT (OUTPATIENT)
Dept: BARIATRICS | Facility: CLINIC | Age: 36
End: 2021-04-09
Payer: COMMERCIAL

## 2021-04-09 DIAGNOSIS — K91.2 POSTSURGICAL MALABSORPTION: Primary | ICD-10-CM

## 2021-04-09 PROCEDURE — RECHECK: Performed by: PHYSICIAN ASSISTANT

## 2021-04-09 RX ADMIN — CYANOCOBALAMIN 1000 MCG: 1000 INJECTION INTRAMUSCULAR; SUBCUTANEOUS at 09:40

## 2021-04-14 ENCOUNTER — DOCUMENTATION (OUTPATIENT)
Dept: BARIATRICS | Facility: CLINIC | Age: 36
End: 2021-04-14

## 2021-04-21 ENCOUNTER — DOCUMENTATION (OUTPATIENT)
Dept: BARIATRICS | Facility: CLINIC | Age: 36
End: 2021-04-21

## 2021-04-26 NOTE — PROGRESS NOTES
Weight Management Nutrition Class       Surgery: Gastric Bypass Laparoscopic    Class: back to basics      Attended the third Back to Basics class for Nutrition - Bariatric Plate/Meal Planning and Dining Out  Reviewed homework assignment from previous class and answered questions  Presented power point presentation on the use of the bariatric plate for weight maintenance   Discussed quick meal planning and the use of either a phone chacorta or a shopping list to streamline shopping  Demonstrated portion sizes with the use of food models  Presented powerpoint on Dining Out   Reviewed handouts on making healthier choices while dining out

## 2021-04-27 NOTE — PROGRESS NOTES
Weight Management Nutrition Class     Surgery: Gastric Bypass Laparoscopic    Class: back to basics    Attended the fourth and final nutrition class for Back to Basics:  Vitamins and Minerals and Nutrition for Exercise  Reviewed homework from previous class and answered questions  Presented power point presentation on post operative Vitamins and Minerals  Reviewed handouts on bariatric products   Power point on nutrition and exercise also presented and  handouts provided  Was provided class evaluation to complete and send    Class was virtual

## 2021-04-28 ENCOUNTER — TELEPHONE (OUTPATIENT)
Dept: PSYCHIATRY | Facility: CLINIC | Age: 36
End: 2021-04-28

## 2021-04-28 NOTE — TELEPHONE ENCOUNTER
Behavorial Health Outpatient Intake Questions    Referred by: WEIGHT MANAGEMENT (not for procedure)    Please advised interviewee that they need to answer all questions truthfully to allow for best care and any misrepresentations of information may affect their ability to be seen at this clinic   => Was this discussed? Yes     Behavorial Health Outpatient Intake History -     Presenting Problem (in patient's words): Depression and anxiety, says she is having some side effects from the medications that she is not happy with    Are there any developmental disabilities? ? If yes, can they speak to you on the phone? If they are too limited to speak to you on phone, refer out Yes ADHD    Are you taking any psychiatric medications? Yes    => If yes, who prescribes? If yes, are they injectable medications? Lexapro and wellbutrin - PCP (  Lu's provider)     Does the patient have a language barrier or hearing impairment? No    Have you been treated at [address] [place] [address] by a therapist or a doctor in the past? If yes, who? No    Has the patient been hospitalized for mental health? No   If yes, how long ago was last hospitalization and where was it? Do you actively use alcohol or marijuana or illegal substances? If yes, what and how much - refer out to Drug and alcohol treatment if use is excessive or daily use of illegal substances No concerns of substance abuse are reported  Do you have a community treatment team or ? No    Legal History-     Does the patient have any history of arrests, group home/shelter time, or DUIs? No  If Yes-  1) What types of charges? 2) When were they last incarcerated? 3) Are they currently on parole or probation? Minor Child-    Who has custody of the child? Is there a custody agreement? If there is a custody agreement remind parent that they must bring a copy to the first appt or they will not be seen       Intake Team, please check with provider before scheduling if flags come up such as:  - complex case  - legal history (other than DUI)  - communication barrier concerns are present  - if, in your judgment, this needs further review    ACCEPTED as a patient Yes  => Appointment Date: 06/16/2021 w/ ANTOLIN RUDOLPH    Referred Elsewhere? No    Name of Insurance Co: [address] ID# [id number]  [id number] Phone #  If ins is primary or secondary  If patient is a minor, parents information such as Name, D  O B of guarantor

## 2021-04-29 ENCOUNTER — DOCUMENTATION (OUTPATIENT)
Dept: BARIATRICS | Facility: CLINIC | Age: 36
End: 2021-04-29

## 2021-05-05 ENCOUNTER — OFFICE VISIT (OUTPATIENT)
Dept: FAMILY MEDICINE CLINIC | Facility: CLINIC | Age: 36
End: 2021-05-05
Payer: COMMERCIAL

## 2021-05-05 VITALS
HEIGHT: 70 IN | HEART RATE: 79 BPM | BODY MASS INDEX: 30.49 KG/M2 | WEIGHT: 213 LBS | RESPIRATION RATE: 19 BRPM | TEMPERATURE: 97.9 F | OXYGEN SATURATION: 97 % | SYSTOLIC BLOOD PRESSURE: 121 MMHG | DIASTOLIC BLOOD PRESSURE: 82 MMHG

## 2021-05-05 DIAGNOSIS — G47.00 INSOMNIA, UNSPECIFIED TYPE: ICD-10-CM

## 2021-05-05 DIAGNOSIS — L30.4 INTERTRIGO: Primary | ICD-10-CM

## 2021-05-05 PROCEDURE — 99213 OFFICE O/P EST LOW 20 MIN: CPT | Performed by: FAMILY MEDICINE

## 2021-05-05 RX ORDER — NYSTATIN 100000 U/G
CREAM TOPICAL 2 TIMES DAILY
Qty: 30 G | Refills: 1 | Status: SHIPPED | OUTPATIENT
Start: 2021-05-05

## 2021-05-05 RX ORDER — TRAZODONE HYDROCHLORIDE 100 MG/1
100 TABLET ORAL
Qty: 90 TABLET | Refills: 3 | Status: SHIPPED | OUTPATIENT
Start: 2021-05-05 | End: 2021-11-12 | Stop reason: SDUPTHER

## 2021-05-05 RX ORDER — TRAZODONE HYDROCHLORIDE 100 MG/1
100 TABLET ORAL
Qty: 30 TABLET | Refills: 3 | Status: SHIPPED | OUTPATIENT
Start: 2021-05-05 | End: 2021-05-05 | Stop reason: SDUPTHER

## 2021-05-05 NOTE — PROGRESS NOTES
BMI Counseling: Body mass index is 30 56 kg/m²  The BMI is above normal  Nutrition recommendations include decreasing portion sizes, decreasing fast food intake, limiting drinks that contain sugar, moderation in carbohydrate intake and reducing intake of saturated and trans fat  Exercise recommendations include moderate physical activity 150 minutes/week and exercising 3-5 times per week  No pharmacotherapy was ordered  Depression Screening and Follow-up Plan: Clincally patient does not have depression  No treatment is required  Assessment/Plan:     Chronic Problems:  No problem-specific Assessment & Plan notes found for this encounter  Visit Diagnosis:  There are no diagnoses linked to this encounter  Subjective:    Patient ID: Chantell Evans is a 28 y o  female  Has schedule for abd plasty with plastic   Has recurrent issue with irritation in  And around umbilical area and beneath skin flap   Is very  Diligent in regard to skin care and hygiene   Area is very uncomfortable concern about recurrent skin infections       Rash        The following portions of the patient's history were reviewed and updated as appropriate: allergies, current medications, past family history, past medical history, past social history, past surgical history and problem list     Review of Systems   Skin: Positive for rash           /82   Pulse 79   Temp 97 9 °F (36 6 °C)   Resp 19   Ht 5' 10" (1 778 m)   Wt 96 6 kg (213 lb)   SpO2 97%   BMI 30 56 kg/m²   Social History     Socioeconomic History    Marital status: /Civil Union     Spouse name: Not on file    Number of children: Not on file    Years of education: Not on file    Highest education level: Not on file   Occupational History    Not on file   Social Needs    Financial resource strain: Not on file    Food insecurity     Worry: Not on file     Inability: Not on file    Transportation needs     Medical: Not on file Non-medical: Not on file   Tobacco Use    Smoking status: Former Smoker     Packs/day: 0 00     Years: 0 00     Pack years: 0 00     Types: Cigarettes     Quit date: 2017     Years since quittin 3    Smokeless tobacco: Former User    Tobacco comment: 0 75 ppd for 2-3 years; History of vaping    Substance and Sexual Activity    Alcohol use: Yes     Alcohol/week: 1 0 standard drinks     Types: 1 Glasses of wine per week     Frequency: Monthly or less     Drinks per session: 3 or 4     Comment: social    Drug use: No    Sexual activity: Yes     Partners: Male     Birth control/protection: I U D       Comment: mirena inserted 18   Lifestyle    Physical activity     Days per week: Not on file     Minutes per session: Not on file    Stress: Not on file   Relationships    Social connections     Talks on phone: Not on file     Gets together: Not on file     Attends Sabianism service: Not on file     Active member of club or organization: Not on file     Attends meetings of clubs or organizations: Not on file     Relationship status: Not on file    Intimate partner violence     Fear of current or ex partner: Not on file     Emotionally abused: Not on file     Physically abused: Not on file     Forced sexual activity: Not on file   Other Topics Concern    Not on file   Social History Narrative    Lives with boyfriend and his kids (part time)     Works at [employer]      Past Medical History:   Diagnosis Date    Abnormal Pap smear of cervix     Anxiety     ASCUS with positive high risk HPV cervical 2018    Back pain     CPAP (continuous positive airway pressure) dependence     Depressed     [facility]     HPV (human papilloma virus) infection     Hypertension     Hx post surgery stable    Irritable bowel syndrome     Morbid obesity with BMI of 50 0-59 9, adult ([facility]     Obesity     Postgastrectomy malabsorption      Family History   Problem Relation Age of Onset    Rheum arthritis Mother  Fibroids Mother     Obesity Mother     Depression Mother     Hypertension Mother     Substance Abuse Mother     Skin cancer Mother    [facility] Mother     Diabetes Father     Hyperlipidemia Father     Hypertension Father     Thyroid disease Father     Substance Abuse Father     Prostate cancer Father     Gout Father     Cancer Father     Depression Sister     Depression Maternal Grandmother     Diabetes Maternal Grandfather     Depression Maternal Grandfather     Breast cancer Neg Hx     Ovarian cancer Neg Hx     Uterine cancer Neg Hx     Cervical cancer Neg Hx      Past Surgical History:   Procedure Laterality Date    ABDOMINAL SURGERY      Gastric Bypass, December 2019    CHOLECYSTECTOMY LAPAROSCOPIC N/A 10/20/2020    Procedure: CHOLECYSTECTOMY LAPAROSCOPIC W/ INTRAOP CHOLANGIOGRAM;  Surgeon: Merline Dunnings, MD;  Location: [facility];  Service: General    COLPOSCOPY  9/2018    EGD      FASCIOTOMY Left     left calf    FEMUR FRACTURE SURGERY Right     [place] LAP GASTRIC BYPASS/ROWAN-EN-Y N/A 12/30/2019    Procedure: BYPASS GASTRIC  ROWAN-EN-Y LAPAROSCOPIC WITH INTRAOPERATIVE EGD;  Surgeon: Brandon Ahmadi MD;  Location: [facility];  Service: Bariatrics    WISDOM TOOTH EXTRACTION         Current Outpatient Medications:     acyclovir (ZOVIRAX) 400 MG tablet, as needed , Disp: , Rfl:     buPROPion (Wellbutrin) 75 mg tablet, Take 1 tablet (75 mg total) by mouth daily, Disp: 90 tablet, Rfl: 1    calcium citrate-vitamin d (Calcium Citrate Chewy Bite) 500-500 MG-UNIT CHEW chewable tablet, Chew 1 tablet 3 (three) times a day, Disp: , Rfl:     dicyclomine (BENTYL) 20 mg tablet, Take 1 tablet (20 mg total) by mouth every 6 (six) hours, Disp: 30 tablet, Rfl: 3    docusate sodium (COLACE) 100 mg capsule, Take 100 mg by mouth 2 (two) times a day, Disp: , Rfl:     escitalopram (LEXAPRO) 5 mg tablet, Take 1 tablet (5 mg total) by mouth daily, Disp: 90 tablet, Rfl: 1    MIRENA, 52 MG, 20 MCG/24HR IUD, TO BE INSERTED ONE TIME BY PRESCRIBER  ROUTE INTRAUTERINE , Disp: , Rfl: 0    Multiple Vitamin (MULTI VITAMIN PO), Take 1 tablet by mouth daily , Disp: , Rfl:     ondansetron (ZOFRAN-ODT) 4 mg disintegrating tablet, DISSOLVE ONE TABLET BY MOUTH EVERY 6 HOURS AS NEEDED FOR NAUSEA OR VOMITING, Disp: 30 tablet, Rfl: 0    Plecanatide (Trulance) 3 MG TABS, Take 1 tablet by mouth daily, Disp: 30 tablet, Rfl: 5    potassium chloride (MICRO-K) 10 MEQ CR capsule, Take 10 mEq by mouth, Disp: , Rfl:     traZODone (DESYREL) 100 mg tablet, Take 1 tablet (100 mg total) by mouth daily at bedtime as needed for sleep, Disp: 30 tablet, Rfl: 3    Current Facility-Administered Medications:     cyanocobalamin injection 1,000 mcg, 1,000 mcg, Intramuscular, Q30 Days, Geri Feliciano PA-C, 1,000 mcg at 04/09/21 0940    No Known Allergies       Lab Review   not applicable     Imaging: No results found  Objective:     Physical Exam  Constitutional:       General: She is not in acute distress  Appearance: She is not ill-appearing  HENT:      Head: Normocephalic and atraumatic  Cardiovascular:      Rate and Rhythm: Normal rate  Pulmonary:      Effort: Pulmonary effort is normal    Skin:     Findings: Rash present  There are no Patient Instructions on file for this visit  LUCRECIA Guzmán    Portions of the record may have been created with voice recognition software  Occasional wrong word or "sound a like" substitutions may have occurred due to the inherent limitations of voice recognition software  Read the chart carefully and recognize, using context, where substitutions have occurred

## 2021-05-10 ENCOUNTER — CLINICAL SUPPORT (OUTPATIENT)
Dept: BARIATRICS | Facility: CLINIC | Age: 36
End: 2021-05-10
Payer: COMMERCIAL

## 2021-05-10 DIAGNOSIS — E53.8 LOW VITAMIN B12 LEVEL: Primary | ICD-10-CM

## 2021-05-10 RX ADMIN — CYANOCOBALAMIN 1000 MCG: 1000 INJECTION INTRAMUSCULAR; SUBCUTANEOUS at 09:10

## 2021-05-13 ENCOUNTER — DOCUMENTATION (OUTPATIENT)
Dept: BARIATRICS | Facility: CLINIC | Age: 36
End: 2021-05-13

## 2021-05-25 ENCOUNTER — CONSULT (OUTPATIENT)
Dept: PLASTIC SURGERY | Facility: CLINIC | Age: 36
End: 2021-05-25
Payer: COMMERCIAL

## 2021-05-25 VITALS
BODY MASS INDEX: 29.84 KG/M2 | HEART RATE: 74 BPM | WEIGHT: 208.44 LBS | DIASTOLIC BLOOD PRESSURE: 80 MMHG | HEIGHT: 70 IN | SYSTOLIC BLOOD PRESSURE: 120 MMHG | TEMPERATURE: 97.7 F

## 2021-05-25 DIAGNOSIS — E65 ABDOMINAL PANNUS: ICD-10-CM

## 2021-05-25 DIAGNOSIS — N62 MACROMASTIA: Primary | ICD-10-CM

## 2021-05-25 PROCEDURE — 99203 OFFICE O/P NEW LOW 30 MIN: CPT | Performed by: PLASTIC SURGERY

## 2021-05-25 NOTE — PROGRESS NOTES
Assessment/Plan   Ms Antonio Garner is a 28 y o  F with symptomatic macromastia and Post bariatric excess of skin in the following regions: abdomen    I believe she would benefit from reduction mammaplasty, CPT 93747-64 with a projected resection of approximately 600 grams from the right and 600 grams from the left breast  A wise pattern skin excision with a superiomedial based pedicle for the nipple areola complex would be the most likely method of reduction  The procedure for reduction mammaplasty was discussed at length with the patient during her visit today  The operative details and expected post-operative course were outlined  I also explained that potential complications included, but were not limited to: change or loss in sensation of the nipple and surrounding nipple/areola complex that may be transient or permanent, scars that will be visible on cut surfaces of the breast, wound separation, infection, breast asymmetry, increased or decreased pigmentation of the skin and/or nipple-areola complex, pain, blood loss, hematoma, seroma, contour deformity, and fat necrosis  The patient expressed a reasonable understanding of the procedure and possible complications  The excess skin of the abdomen interferes with daily activities and causes this patient problems that are outlined in the above note  I believe this patient could benefit from excision of the abdominal pannus  I explained the procedure for panniculectomy to this patient  I explained that the purpose of this procedure is to reduce the overhanging skin on the lower abdomen  Further reduction of abdominal skin or excess fat would require a more extensive procedure such as an abdominoplasty  I also explained that this procedure does not correct  abdominal wall fascial laxity, which would also require an abdominoplasty procedure for correction   We reviewed the major and minor complications associated with panniculectomy, including possible loss of the umbilicus, wound dehiscence, skin necrosis, wound infection, excessive widening or scarring of the wounds, seroma, hematoma, need for further surgery, and unacceptable cosmetic result  Photos were taken at today's visit  We will plan to see Ms Syed back for a pre-operative visit once her surgery is scheduled to review the details of her operation, answer further questions, and place operative markings  I spent 45 minutes in face to face time with this patient, and over half of this time was spent in discussing surgical options and educating the patient  Candice [address]   [address]   [place], [address]    Office: [phone]          Subjective   Patient ID: Kelly Hamlin is a 28 y o  female  Vitals:    05/25/21 1113   BP: 120/80   Pulse: 74   Temp: 97 7 °F (36 5 °C)       Ms Hassell Leyden is a 28 y o  female who presents for evaluation of body contouring after massive weight loss and reduction mammaplasty  The patient underwent bariatric procedure and has lost approximately 75 pounds  she has been at a stable weight for 10 months  The patient now has complaints of excess skin in the following areas: abdomen  Pertaining to the excess skin:  The patient does not have an associated ventral hernia  The patient does have difficulty getting in and out of bed  The patient does have difficulty standing and walking straight   The patient does have difficulty tying her shoes  The patient does not have difficulty crossing her legs  The patient does not have difficulty finding clothes that fit appropriately  The patient does have recurrent skin rashes  The patient does not have skin infections  The patient does have foul odors  The patient does not have non-healing skin ulcers   The patient does have back pain      Her breast associated symptoms are as follows:     She does have cervical and/or upper thoracic pain that has lasted for three months or longer  If yes, the patient does describe concurrent conservative treatment with support garments, without NSAIDs, and/or without physical therapy by a licensed Physical Therpaist     She does have sub-mammary intertrigo that is refractory to conventional medications    She does have shoulder grooving without ulceration  She does not have a previous history of breast surgery  She does not have a history of recent weight change  She does not have a history of nipple discharge  Her current bra size is DD  She would ideally like to be bra cup size smallest       She does not know feel that her breasts are equal size  She has never had a mammogram       The following portions of the patient's history were reviewed and updated as appropriate: allergies, current medications, past family history, past medical history, past social history, past surgical history and problem list     Review of Systems    Objective   Physical Exam   Vitals reviewed  Constitutional  She appears well-developed  Eyes  Pupils are equal, round, and reactive to light  System normal        Cardiovascular: Normal rate  Pulmonary/Chest  Effort normal      Psychiatric  She has a normal mood and affect  Her behavior is normal  Thought content normal      Abdomen and Hips  Soft  Hernia confirmed negative in the ventral area  Abdominal shape is panniculus  She has abdominal striae  A surgical scar is present  Patient has excess abdominal fat   Excess fat located in the: lower abdomen, lateral abdomen and ioana-umbilical      Breast  Breast Measurements:   Right Left   A: Sternal notch to nipple distance (cm) 35 34   B: Midsternum to nipple distance (cm)     C: Midclavicle to nipple distance (cm)     D: Nipple to inframammary fold (cm) 10 12 5   E: Base width (cm) 15 5 15 5   F: Inframammary distance (cm) 19      Right Left   Areolar diameter (cm) 7 5 6   Nipple diameter (cm) Superior tissue pinch test (cm)     Breast ptosis (grade 0-3) 2 2         Her breasts have loose envelope compliance  Her breasts have adequate tissue coverage  Body surface area is 2 12 meters squared

## 2021-06-07 NOTE — ASSESSMENT & PLAN NOTE
-At risk for malabsorption of vitamins/minerals secondary to malabsorption and restriction of intake from bariatric surgery  -Currently taking adequate postop bariatric surgery vitamin supplementation: Procare MVI with 45mg iron, monthly B12 1,000mcg IM injections, Advantage calcium citrate TID - inconsistent with calcium  -Encouraged her to increase compliance with calcium  -Last set of bariatric labs completed on 12/09/20: HDL improved and cholesterol WNL, iron WNL and trending up, rest of vitamins WNL     -Repeat B12 in September, full panel in December 2021  -Patient received education about the importance of adhering to a lifelong supplementation regimen to avoid vitamin/mineral deficiencies

## 2021-06-07 NOTE — ASSESSMENT & PLAN NOTE
-Greatly improved   -On Trulance and stool softeners; followed by GI   -Increase hydrating fluid intake to at least 64oz-80oz daily, include probiotic rich foods/supplement

## 2021-06-07 NOTE — ASSESSMENT & PLAN NOTE
-No longer wearing CPAP, feels well rested;   -Repeat sleep study shows now only mild CELE; she declined further use of CPAP

## 2021-06-07 NOTE — ASSESSMENT & PLAN NOTE
-s/p Sandip-En-Y Gastric Bypass with Dr Nicole Dorado on 12/30/19  Overall doing Well with an impressive 79% EWL; however, she is frustrated with 6lbs rebound from her ester and would like to push below 200lbs  She will f/u with RD and possibly MWM consult for additional support  Initial: 355lbs  Current: 212lbs    EWL: 79%  Ester: 206lbs 1 year post op  Current BMI is Body mass index is 30 42 kg/m²      · Tolerating a regular diet-yes  · Eating at least 60 grams of protein per day-not always lately; she will f/u with RD   · Following 30/60 minute rule with liquids-yes  · Drinking at least 64 ounces of fluid per day-no; she agrees to increase to goal  · Drinking carbonated beverages-no  · Sufficient exercise-walking 1 5+ miles daily; encouraged her to increase as able and include interval walking  · Using NSAIDs regularly-no  · Using nicotine-no  · Using alcohol-no - Advised about the risks of alcohol s/p bariatric surgery and recommend avoiding all alcohol

## 2021-06-07 NOTE — ASSESSMENT & PLAN NOTE
-On wellbutrin, lexapro  -High stress, anxiety, depressed mood lately  -Has upcoming appointment with Psychiatrist to discuss medications and possible changes   -Encouraged continued f/u with therapist/counselor

## 2021-06-08 ENCOUNTER — DOCUMENTATION (OUTPATIENT)
Dept: BARIATRICS | Facility: CLINIC | Age: 36
End: 2021-06-08

## 2021-06-10 ENCOUNTER — CLINICAL SUPPORT (OUTPATIENT)
Dept: BARIATRICS | Facility: CLINIC | Age: 36
End: 2021-06-10

## 2021-06-10 ENCOUNTER — OFFICE VISIT (OUTPATIENT)
Dept: BARIATRICS | Facility: CLINIC | Age: 36
End: 2021-06-10
Payer: COMMERCIAL

## 2021-06-10 VITALS
HEART RATE: 65 BPM | BODY MASS INDEX: 30.35 KG/M2 | TEMPERATURE: 96.4 F | HEIGHT: 70 IN | SYSTOLIC BLOOD PRESSURE: 102 MMHG | RESPIRATION RATE: 12 BRPM | WEIGHT: 212 LBS | DIASTOLIC BLOOD PRESSURE: 74 MMHG

## 2021-06-10 DIAGNOSIS — F33.42 RECURRENT MAJOR DEPRESSIVE DISORDER, IN FULL REMISSION (HCC): ICD-10-CM

## 2021-06-10 DIAGNOSIS — K91.2 POSTSURGICAL MALABSORPTION: ICD-10-CM

## 2021-06-10 DIAGNOSIS — Z48.815 ENCOUNTER FOR SURGICAL AFTERCARE FOLLOWING SURGERY OF DIGESTIVE SYSTEM: Primary | ICD-10-CM

## 2021-06-10 DIAGNOSIS — Z98.84 BARIATRIC SURGERY STATUS: Primary | ICD-10-CM

## 2021-06-10 DIAGNOSIS — G47.33 OBSTRUCTIVE SLEEP APNEA: ICD-10-CM

## 2021-06-10 DIAGNOSIS — K59.00 CONSTIPATION, UNSPECIFIED CONSTIPATION TYPE: ICD-10-CM

## 2021-06-10 PROCEDURE — 99214 OFFICE O/P EST MOD 30 MIN: CPT | Performed by: PHYSICIAN ASSISTANT

## 2021-06-10 RX ADMIN — CYANOCOBALAMIN 1000 MCG: 1000 INJECTION INTRAMUSCULAR; SUBCUTANEOUS at 14:08

## 2021-06-10 NOTE — PATIENT INSTRUCTIONS
GOALS:   · Continued/Maintain healthy weight loss with good nutrition intakes  · Adequate hydration with at least 64oz  fluid intake  · Normal vitamin and mineral levels  · Exercise as tolerated  · Follow-up in 6 months  We kindly ask that your arrive 15 minutes before your scheduled appointment time with your provider to allow our staff to room you, get your vital signs and update your chart  · Follow diet as discussed  · Get lab work done in 6 months  You have been given a lab slip today  Please call the office if you need a replacement  It is recommended to check with your insurance BEFORE getting labs done to make sure they are covered by your policy  Also, please check with your PCP and other providers before getting labs to avoid duplicate labs  Make sure to HOLD any multivitamins that may contain biotin and any biotin supplements FOR 5 DAYS before any labs since it can affect the results  · Follow vitamin and mineral recommendations as reviewed with you  · Call our office if you have any problems with abdominal pain especially associated with fever, chills, nausea, vomiting or any other concerns  · All  Post-bariatric surgery patients should be aware that very small quantities of any alcohol can cause impairment and it is very possible not to feel the effect  The effect can be in the system for several hours  It is also a stomach irritant  · It is advised to AVOID alcohol, Nonsteroidal antiinflammatory drugs (NSAIDS) and nicotine of all forms   Any of these can cause stomach irritation/pain

## 2021-06-10 NOTE — PROGRESS NOTES
Assessment/Plan:    Encounter for surgical aftercare following surgery of digestive system  -s/p Sandip-En-Y Gastric Bypass with Dr Surendra Mccabe on 12/30/19  Overall doing Well with an impressive 79% EWL; however, she is frustrated with 6lbs rebound from her ester and would like to push below 200lbs  She will f/u with RD and possibly MWM consult for additional support  Initial: 355lbs  Current: 212lbs    EWL: 79%  Ester: 206lbs 1 year post op  Current BMI is Body mass index is 30 42 kg/m²      · Tolerating a regular diet-yes  · Eating at least 60 grams of protein per day-not always lately; she will f/u with RD   · Following 30/60 minute rule with liquids-yes  · Drinking at least 64 ounces of fluid per day-no; she agrees to increase to goal  · Drinking carbonated beverages-no  · Sufficient exercise-walking 1 5+ miles daily; encouraged her to increase as able and include interval walking  · Using NSAIDs regularly-no  · Using nicotine-no  · Using alcohol-no - Advised about the risks of alcohol s/p bariatric surgery and recommend avoiding all alcohol    Postsurgical malabsorption  -At risk for malabsorption of vitamins/minerals secondary to malabsorption and restriction of intake from bariatric surgery  -Currently taking adequate postop bariatric surgery vitamin supplementation: Procare MVI with 45mg iron, monthly B12 1,000mcg IM injections, Advantage calcium citrate TID - inconsistent with calcium  -Encouraged her to increase compliance with calcium  -Last set of bariatric labs completed on 12/09/20: HDL improved and cholesterol WNL, iron WNL and trending up, rest of vitamins WNL     -Repeat B12 in September, full panel in December 2021  -Patient received education about the importance of adhering to a lifelong supplementation regimen to avoid vitamin/mineral deficiencies     Obstructive sleep apnea  -No longer wearing CPAP, feels well rested;   -Repeat sleep study shows now only mild CELE; she declined further use of CPAP    Recurrent major depressive disorder, in full remission ([facility] [address] )  -On wellbutrin, lexapro  -High stress, anxiety, depressed mood lately  -Has upcoming appointment with Psychiatrist to discuss medications and possible changes   -Encouraged continued f/u with therapist/counselor    Constipation  -Greatly improved   -On Trulance and stool softeners; followed by GI   -Increase hydrating fluid intake to at least 64oz-80oz daily, include probiotic rich foods/supplement               Diagnoses and all orders for this visit:    Encounter for surgical aftercare following surgery of digestive system    Postsurgical malabsorption  -     CBC and differential; Future  -     Comprehensive metabolic panel; Future  -     Folate; Future  -     Iron Panel (Includes Ferritin, Iron Sat%, Iron, and TIBC); Future  -     Zinc; Future  -     Vitamin D 25 hydroxy; Future  -     Vitamin B12; Future  -     Vitamin B1, whole blood; Future  -     Vitamin A; Future  -     Lipid panel; Future  -     PTH, intact; Future    Obstructive sleep apnea    Recurrent major depressive disorder, in full remission (HCC)    Constipation, unspecified constipation type          Subjective:      Patient ID: Gavin Elder is a 28 y o  female  -s/p Sandip-En-Y Gastric Bypass with Dr Reshma Lee on 12/30/19  Presents to the office today for routine follow up  Tolerating a regular diet without issues; denies N/V, dysphagia, reflux  Overall doing very well with overall weight loss, but frustrated by 6lbs weight rebound and would like to get below 200lbs  Constipation is greatly improved  She reports occasional dumping if eating high sugar foods  Not always consistent with calcium, but takes vitamins daily  Daily walking  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  She has upcoming appointment with Psychiatrist to discuss medications for anxiety/depression    She gets intermittent rashes in umbilicus and under abdominal skin folds and has chronic upper back pain and shoulder pain from pendulous breasts  The following portions of the patient's history were reviewed and updated as appropriate: allergies, current medications, past family history, past medical history, past social history, past surgical history and problem list     Review of Systems   Constitutional: Positive for fatigue and unexpected weight change (weight regain)  Negative for chills and fever  HENT: Negative for trouble swallowing  Respiratory: Negative for cough and shortness of breath  Cardiovascular: Negative for chest pain and palpitations  Gastrointestinal: Negative for abdominal pain, constipation, diarrhea, nausea and vomiting  Musculoskeletal: Positive for arthralgias and back pain (upper back)  Skin: Positive for rash  Neurological: Negative for dizziness  Psychiatric/Behavioral: The patient is nervous/anxious  +Depression         Objective:      /74   Pulse 65   Temp (!) 96 4 °F (35 8 °C)   Resp 12   Ht 5' 10" (1 778 m)   Wt 96 2 kg (212 lb)   BMI 30 42 kg/m²     Colonoscopy-Not applicable       Physical Exam  Vitals signs reviewed  Constitutional:       General: She is not in acute distress  Appearance: She is well-developed  HENT:      Head: Normocephalic and atraumatic  Eyes:      General: No scleral icterus  Cardiovascular:      Rate and Rhythm: Normal rate and regular rhythm  Pulmonary:      Effort: Pulmonary effort is normal  No respiratory distress  Abdominal:      General: There is no distension  Palpations: Abdomen is soft  Tenderness: There is no abdominal tenderness  Comments: No incisional hernias appreciated   Skin:     General: Skin is warm and dry  Findings: Rash (erythematous raw skin under abdominal skin fold) present  Neurological:      Mental Status: She is alert and oriented to person, place, and time     Psychiatric:         Mood and Affect: Mood normal          Behavior: Behavior normal            BARRIERS: none identified    GOALS:   · Continued/Maintain healthy weight loss with good nutrition intakes  · Adequate hydration with at least 64oz  fluid intake  · Normal vitamin and mineral levels  · Exercise as tolerated  · Follow-up in 6 months  We kindly ask that your arrive 15 minutes before your scheduled appointment time with your provider to allow our staff to room you, get your vital signs and update your chart  · Follow diet as discussed  · Get lab work done again in September and full panel again in 6 months  You have been given a lab slip today  Please call the office if you need a replacement  It is recommended to check with your insurance BEFORE getting labs done to make sure they are covered by your policy  Also, please check with your PCP and other providers before getting labs to avoid duplicate labs  Make sure to HOLD any multivitamins that may contain biotin and any biotin supplements FOR 5 DAYS before any labs since it can affect the results  · Follow vitamin and mineral recommendations as reviewed with you  · Call our office if you have any problems with abdominal pain especially associated with fever, chills, nausea, vomiting or any other concerns  · All  Post-bariatric surgery patients should be aware that very small quantities of any alcohol can cause impairment and it is very possible not to feel the effect  The effect can be in the system for several hours  It is also a stomach irritant  · It is advised to AVOID alcohol, Nonsteroidal antiinflammatory drugs (NSAIDS) and nicotine of all forms   Any of these can cause stomach irritation/pain

## 2021-06-16 ENCOUNTER — OFFICE VISIT (OUTPATIENT)
Dept: PSYCHIATRY | Facility: CLINIC | Age: 36
End: 2021-06-16
Payer: COMMERCIAL

## 2021-06-16 DIAGNOSIS — F41.1 GENERALIZED ANXIETY DISORDER: Primary | ICD-10-CM

## 2021-06-16 DIAGNOSIS — F33.9 MAJOR DEPRESSION, RECURRENT, CHRONIC (HCC): ICD-10-CM

## 2021-06-16 DIAGNOSIS — Z98.84 BARIATRIC SURGERY STATUS: ICD-10-CM

## 2021-06-16 PROCEDURE — 90791 PSYCH DIAGNOSTIC EVALUATION: CPT | Performed by: NURSE PRACTITIONER

## 2021-06-16 RX ORDER — PAROXETINE 10 MG/1
10 TABLET, FILM COATED ORAL DAILY
Qty: 14 TABLET | Refills: 0 | Status: SHIPPED | OUTPATIENT
Start: 2021-06-16 | End: 2021-06-23 | Stop reason: DRUGHIGH
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
## 2021-06-16 NOTE — BH TREATMENT PLAN
TREATMENT PLAN (Medication Management Only)        [facility]    Name and Date of Birth:  Mattie Alvarado 28 y o  1985  Date of Treatment Plan: June 16, 2021  Diagnosis/Diagnoses:    1  Generalized anxiety disorder    2  Bariatric surgery status    3  Major depression, recurrent, chronic (HCC)      Strengths/Personal Resources for Self-Care: supportive family, taking medications as prescribed, ability to adapt to life changes, ability to communicate well, ability to understand psychiatric illness, motivation for treatment  Area/Areas of need (in own words): anxiety symptoms  1  Long Term Goal: alleviate anxiety  Target Date:6 months - 12/16/2021  Person/Persons responsible for completion of goal: Ellis Deshpande  2  Short Term Objective (s) - How will we reach this goal?:   A  Provider new recommended medication/dosage changes and/or continue medication(s): continue current medications as prescribed Paxil, Wellbutrin  Alpha Savory a healthy diet   C  Exercise regularly   Target Date:6 months - 12/16/2021  Person/Persons Responsible for Completion of Goal: Ellis Deshpande  Progress Towards Goals: starting treatment  Treatment Modality: medication management every 1 weeks, referral for individual psychotherapy  Review due 180 days from date of this plan: 6 months - 12/16/2021  Expected length of service: ongoing treatment     Treatment Plan done but not signed at time of office visit due to:  Plan reviewed by phone or in person  and verbal consent given due to [address] distancing    My Physician/PA/NP and I have developed this plan together and I agree to work on the goals and objectives  I understand the treatment goals that were developed for my treatment

## 2021-06-22 ENCOUNTER — TELEPHONE (OUTPATIENT)
Dept: PSYCHIATRY | Facility: CLINIC | Age: 36
End: 2021-06-22

## 2021-06-23 ENCOUNTER — OFFICE VISIT (OUTPATIENT)
Dept: PSYCHIATRY | Facility: CLINIC | Age: 36
End: 2021-06-23
Payer: COMMERCIAL

## 2021-06-23 DIAGNOSIS — F41.1 GENERALIZED ANXIETY DISORDER: Primary | ICD-10-CM

## 2021-06-23 DIAGNOSIS — F33.9 MAJOR DEPRESSION, RECURRENT, CHRONIC (HCC): ICD-10-CM

## 2021-06-23 PROCEDURE — 99214 OFFICE O/P EST MOD 30 MIN: CPT | Performed by: NURSE PRACTITIONER

## 2021-06-23 RX ORDER — PAROXETINE HYDROCHLORIDE 20 MG/1
20 TABLET, FILM COATED ORAL DAILY
Qty: 30 TABLET | Refills: 1 | Status: SHIPPED | OUTPATIENT
Start: 2021-06-23 | End: 2021-07-21 | Stop reason: DRUGHIGH

## 2021-06-23 NOTE — PSYCH
Regular Visit    Problem List Items Addressed This Visit     None      Visit Diagnoses     Generalized anxiety disorder    -  Primary    Relevant Medications    PARoxetine (PAXIL) 20 mg tablet    Major depression, recurrent, chronic (HCC)        Relevant Medications    PARoxetine (PAXIL) 20 mg tablet             Encounter provider LUCRECIA Garland    Provider located at   [place] [address] [address] [id number] [phone]    Recent Visits  Date Type Provider Dept   06/16/21 Office Visit Devonte Garland recent visits within past 7 days and meeting all other requirements  Future Appointments  No visits were found meeting these conditions  Showing future appointments within next 150 days and meeting all other requirements       HPI     Current Outpatient Medications   Medication Sig Dispense Refill    acyclovir (ZOVIRAX) 400 MG tablet as needed       calcium citrate-vitamin d (Calcium Citrate Chewy Bite) 500-500 MG-UNIT CHEW chewable tablet Chew 1 tablet 3 (three) times a day      dicyclomine (BENTYL) 20 mg tablet Take 1 tablet (20 mg total) by mouth every 6 (six) hours 30 tablet 3    docusate sodium (COLACE) 100 mg capsule Take 100 mg by mouth 2 (two) times a day      MIRENA, 52 MG, 20 MCG/24HR IUD TO BE INSERTED ONE TIME BY PRESCRIBER   ROUTE INTRAUTERINE   0    Multiple Vitamin (MULTI VITAMIN PO) Take 1 tablet by mouth daily       nystatin (MYCOSTATIN) cream Apply topically 2 (two) times a day 30 g 1    ondansetron (ZOFRAN-ODT) 4 mg disintegrating tablet DISSOLVE ONE TABLET BY MOUTH EVERY 6 HOURS AS NEEDED FOR NAUSEA OR VOMITING 30 tablet 0    PARoxetine (PAXIL) 20 mg tablet Take 1 tablet (20 mg total) by mouth daily 30 tablet 1    Plecanatide (Trulance) 3 MG TABS Take 1 tablet by mouth daily 30 tablet 5    potassium chloride (MICRO-K) 10 MEQ CR capsule Take 10 mEq by mouth  Probiotic Product (PROBIOTIC PO) Take by mouth      traZODone (DESYREL) 100 mg tablet Take 1 tablet (100 mg total) by mouth daily at bedtime as needed for sleep 90 tablet 3     Current Facility-Administered Medications   Medication Dose Route Frequency Provider Last Rate Last Admin    cyanocobalamin injection 1,000 mcg  1,000 mcg Intramuscular Q30 Days Ivett Mckeon PA-C   1,000 mcg at 06/10/21 1408       Review of Systems    I spent 30 minutes directly with the patient during this visit      [address] [place] [address]    Name and Date of Birth:  Belén Queen 28 y o  1985 MRN: [id number]    Date of Visit: June 23, 2021    No Known Allergies  SUBJECTIVE:    Vidya is seen today for a follow up for Major Depressive Disorder  She continues to improve gradually since the last visit  Vidya last seen by this provider on 06/16/2021  At that time we were starting Paxil for anxiety and depression  She reports that Paxil was mildly effective and has anxiety rated 7/10, 10 being worst symptoms and depression 6/10  She also states that she will be living with her mom until she is able to find a new apartment, which has taken off a lot of stress as she needs to be out of her current house by 07/10/2021  She also reports that she has no more restless legs since stopping the Lexapro  She has had some irritability with her  and is trying to be patient with him  She has also started back on her diet and has lost 5 lb  She denies any suicidal or homicidal ideation, and denies auditory and visual hallucinations  She does not endorse signs or symptoms of shaq or psychosis  She reports that she still feels fatigued all the time, but it is better than what she was before starting the Paxil  She states that she sleeps 6-7 hours with frequent awakenings during the night    She is prescribed 100 mg of trazodone but only takes 50 mg at this time  We discussed increasing that to 75 mg to try to decrease the frequent awakenings  She reports good appetite, and her energy is adequate  She does have decreased motivation and focus, but discussed that it may be related to her depression and anxiety  We will discontinue the Wellbutrin 75 mg at this time and increase the Paxil to 20 mg  Will follow-up in 3 weeks  She did receive a call regarding her therapy referral and will call to schedule an appointment  She does continue to see the counselor at [facility]  She denies any side effects from current psychiatric medications  PLAN:  Discontinue Wellbutrin 75 mg  Increase Paxil to 20 mg p o  daily  Follow-up in 3 weeks  She will call sooner if issues or concerns arise prior to scheduled appointment  Aware of 24 hour and weekend coverage for urgent situations accessed by calling [facility] main practice number  Will start individual therapy with [facility] therapist she will call to schedule  Medication management every 3 weeks  Aware of need to follow up with family physician for medical issues    Diagnoses and all orders for this visit:    Generalized anxiety disorder    Major depression, recurrent, chronic (HCC)  -     PARoxetine (PAXIL) 20 mg tablet; Take 1 tablet (20 mg total) by mouth daily        Current Outpatient Medications on File Prior to Visit   Medication Sig Dispense Refill    acyclovir (ZOVIRAX) 400 MG tablet as needed       calcium citrate-vitamin d (Calcium Citrate Chewy Bite) 500-500 MG-UNIT CHEW chewable tablet Chew 1 tablet 3 (three) times a day      dicyclomine (BENTYL) 20 mg tablet Take 1 tablet (20 mg total) by mouth every 6 (six) hours 30 tablet 3    docusate sodium (COLACE) 100 mg capsule Take 100 mg by mouth 2 (two) times a day      MIRENA, 52 MG, 20 MCG/24HR IUD TO BE INSERTED ONE TIME BY PRESCRIBER   ROUTE INTRAUTERINE   0    Multiple Vitamin (MULTI VITAMIN PO) Take 1 tablet by mouth daily       nystatin (MYCOSTATIN) cream Apply topically 2 (two) times a day 30 g 1    ondansetron (ZOFRAN-ODT) 4 mg disintegrating tablet DISSOLVE ONE TABLET BY MOUTH EVERY 6 HOURS AS NEEDED FOR NAUSEA OR VOMITING 30 tablet 0    Plecanatide (Trulance) 3 MG TABS Take 1 tablet by mouth daily 30 tablet 5    potassium chloride (MICRO-K) 10 MEQ CR capsule Take 10 mEq by mouth      Probiotic Product (PROBIOTIC PO) Take by mouth      traZODone (DESYREL) 100 mg tablet Take 1 tablet (100 mg total) by mouth daily at bedtime as needed for sleep 90 tablet 3    [DISCONTINUED] buPROPion (Wellbutrin) 75 mg tablet Take 1 tablet (75 mg total) by mouth daily 90 tablet 1    [DISCONTINUED] PARoxetine (PAXIL) 10 mg tablet Take 1 tablet (10 mg total) by mouth daily 14 tablet 0     Current Facility-Administered Medications on File Prior to Visit   Medication Dose Route Frequency Provider Last Rate Last Admin    cyanocobalamin injection 1,000 mcg  1,000 mcg Intramuscular Q30 Days Sam Cantor PA-C   1,000 mcg at 06/10/21 1408       Psychotherapy Provided:     Individual psychotherapy provided: Yes  Supportive counseling provided  Medication changes discussed with Lizzy Soto  Medication education provided to Lizzy Soto  Recent stressors discussed with Lizzy Soto including housing, , school, work  Educated on importance of medication and treatment compliance  Reassurance and supportive therapy provided  Crisis/safety plan discussed with Lizzy Soto  HPI ROS Appetite Changes and Sleep:     She reports adequate number of sleep hours (6-7 hours), frequent awakenings, normal appetite, adequate energy level   Patient denies suicidal or homicidal ideation    Review Of Systems:      HPI ROS:               Medication Side Effects:  denies     Depression (10 worst): 6/10 (Was )   Anxiety (10 worst): 7/10 (Was )   Safety concerns (SI, HI, etc): denies (Was denies)   Sleep: 6-7 hrs/night, frequent awakenings (Was )   Energy: Adequate, low motivation (Was )   Appetite: good (Was good)   Weight Change: Dec  In 5 lbs      General normal    Personality no change in personality   Constitutional negative   ENT negative   Cardiovascular negative   Respiratory negative   Gastrointestinal negative   Genitourinary negative   Musculoskeletal negative   Integumentary negative   Neurological negative   Endocrine negative   Other Symptoms none, all other systems are negative     Mental Status Evaluation:    Appearance Adequate hygiene and grooming   Behavior calm and cooperative and friendly   Mood anxious and depressed  Depression Scale - 6 of 10 (0 = No depression)  Anxiety Scale - 7 of 10 (0 = No anxiety)   Speech Normal rate and volume   Affect appropriate and mood-congruent   Thought Processes Goal directed and coherent   Thought Content Does not verbalize delusional material   Associations Tightly connected   Perceptual Disturbances Denies hallucinations and does not appear to be responding to internal stimuli   Risk Potential Suicidal/Homicidal Ideation - No evidence of suicidal or homicidal ideation and patient does not verbalize suicidal or homicidal ideation  Risk of Violence - No evidence of risk for violence found on assessment  Risk of Self Mutilation - No evidence of risk for self mutilation found on assessment   Orientation oriented to person, place, time/date and situation   Memory recent and remote memory grossly intact   Consciousness alert and awake   Attention/Concentration attention span and concentration are age appropriate   Insight improving   Judgement improving   Muscle Strength and Gait normal muscle strength and normal muscle tone, normal gait/station and normal balance   Motor Activity no abnormal movements   Language no difficulty naming common objects, no difficulty repeating a phrase, no difficulty writing a sentence   Fund of Knowledge adequate knowledge of current events  adequate fund of knowledge regarding past history  adequate fund of knowledge regarding vocabulary      Past Psychiatric History Update:     Inpatient Psychiatric Admission Since Last Encounter:   no  Changes to Outpatient Psychiatric Treatment Team:    no  Suicide Attempt Or Self Mutilation Since Last Encounter:   no  Incidence of Violent Behavior Since Last Encounter:   no    Traumatic History Update:     New Onset of Abuse Since Last Encounter:   no  Traumatic Events Since Last Encounter:   no    Past Medical History:    Past Medical History:   Diagnosis Date    Abnormal Pap smear of cervix     Anxiety     ASCUS with positive high risk HPV cervical 11/6/2018    Back pain     CPAP (continuous positive airway pressure) dependence     Depressed     Depression     Femur fracture (HCC)     Head injury     concussion in 2001 fell off a horse    HPV (human papilloma virus) infection     Hypertension     Hx post surgery stable    Irritable bowel syndrome     Morbid obesity with BMI of 50 0-59 9, adult ([facility] )     Obesity     Panic attack     Postgastrectomy malabsorption     Psychiatric illness     PTSD (post-traumatic stress disorder)      Past Medical History Pertinent Negatives:   Diagnosis Date Noted    [facility]21     Past Surgical History:   Procedure Laterality Date    ABDOMINAL SURGERY      Gastric Bypass, December 2019    CHOLECYSTECTOMY LAPAROSCOPIC N/A 10/20/2020    Procedure: CHOLECYSTECTOMY LAPAROSCOPIC W/ INTRAOP CHOLANGIOGRAM;  Surgeon: Bhakti Hale MD;  Location: [facility];  Service: General    COLPOSCOPY  9/2018    EGD      FASCIOTOMY Left     left calf    FEMUR FRACTURE SURGERY Right     AR LAP GASTRIC BYPASS/ROWAN-EN-Y N/A 12/30/2019    Procedure: BYPASS GASTRIC  ROWAN-EN-Y LAPAROSCOPIC WITH INTRAOPERATIVE EGD;  Surgeon: Bria Delatorre MD;  Location: [facility];  Service: Bariatrics    WISDOM TOOTH EXTRACTION       No Known Allergies  Substance Abuse History:    Social History     Substance and Sexual Activity   Alcohol Use Yes    Alcohol/week: 1 0 standard drinks    Types: 1 Glasses of wine per week    Comment: social     Social History     Substance and Sexual Activity   Drug Use No     Social History:    Social History     Socioeconomic History    Marital status: /Civil Union     Spouse name: Not on file    Number of children: 2    Years of education: college    Highest education level: Associate degree: occupational, technical, or vocational program   Occupational History    Not on file   Tobacco Use    Smoking status: Former Smoker     Packs/day: 0 00     Years: 0 00     Pack years: 0 00     Types: Cigarettes     Quit date:      Years since quittin 4    Smokeless tobacco: Never Used    Tobacco comment: 0 75 ppd for 2-3 years; History of vaping    Vaping Use    Vaping Use: Former    Substances: Nicotine   Substance and Sexual Activity    Alcohol use: Yes     Alcohol/week: 1 0 standard drinks     Types: 1 Glasses of wine per week     Comment: social    Drug use: No    Sexual activity: Yes     Partners: Male     Birth control/protection: I U D  Comment: mirena inserted 18   Other Topics Concern    Not on file   Social History Narrative    Lives with boyfriend and his kids (part time)     Works at [employer]:     Difficulty of Paying Living Expenses:    Food Insecurity:     Worried About [facility] in the Last Year:    [address] in the Last Year:    Transportation Needs:     Lack of Transportation (Medical):      Lack of Transportation (Non-Medical):    Physical Activity:     Days of Exercise per Week:     Minutes of Exercise per Session:    Stress:     Feeling of Stress :    Social Connections:     Frequency of Communication with Friends and Family:     Frequency of Social Gatherings with Friends and Family:     Attends Rastafarian Services:     Active Member of Clubs or Organizations:     Attends Club or Organization Meetings:     Marital Status:    Intimate Partner Violence: Not At Risk    Fear of Current or Ex-Partner: No    Emotionally Abused: No    Physically Abused: No    Sexually Abused: No     Family Psychiatric History:     Family History   Problem Relation Age of Onset    Rheum arthritis Mother     Fibroids Mother     Obesity Mother     Depression Mother     Hypertension Mother     Substance Abuse Mother     Skin cancer Mother    [facility] Cancer Mother     Diabetes Father     Hyperlipidemia Father     Hypertension Father     Thyroid disease Father     Substance Abuse Father     Prostate cancer Father     Gout Father     Cancer Father     Depression Sister     Depression Maternal Grandmother     Diabetes Maternal Grandfather     Depression Maternal Grandfather     Breast cancer Neg Hx     Ovarian cancer Neg Hx     Uterine cancer Neg Hx     Cervical cancer Neg Hx      History Review: The following portions of the patient's history were reviewed and updated as appropriate: allergies, current medications, past family history, past medical history, past social history, past surgical history and problem list     OBJECTIVE:     Vital signs in last 24 hours: There were no vitals filed for this visit  Laboratory Results:   Recent Labs (last 2 months):   No visits with results within 2 Month(s) from this visit     Latest known visit with results is:   Lab on 12/09/2020   Component Date Value    WBC 12/09/2020 8 66     RBC 12/09/2020 4 39     Hemoglobin 12/09/2020 13 3     Hematocrit 12/09/2020 41 1     MCV 12/09/2020 94     MCH 12/09/2020 30 3     MCHC 12/09/2020 32 4     RDW 12/09/2020 12 6     MPV 12/09/2020 10 1     Platelets 15/53/9578 257     nRBC 12/09/2020 0     Neutrophils Relative 12/09/2020 58     Immat GRANS % 12/09/2020 0     Lymphocytes Relative 12/09/2020 35     Monocytes Relative 12/09/2020 5     Eosinophils Relative 12/09/2020 2     Basophils Relative 12/09/2020 0     Neutrophils Absolute 12/09/2020 5 00     Immature Grans Absolute 12/09/2020 0 03     Lymphocytes Absolute 12/09/2020 3 03     Monocytes Absolute 12/09/2020 0 43     Eosinophils Absolute 12/09/2020 0 15     Basophils Absolute 12/09/2020 0 02     Sodium 12/09/2020 147*    Potassium 12/09/2020 4 2     Chloride 12/09/2020 109*    CO2 12/09/2020 30     ANION GAP 12/09/2020 8     BUN 12/09/2020 16     Creatinine 12/09/2020 0 86     Glucose, Fasting 12/09/2020 85     Calcium 12/09/2020 8 9     AST 12/09/2020 21     ALT 12/09/2020 60     Alkaline Phosphatase 12/09/2020 107     Total Protein 12/09/2020 6 7     Albumin 12/09/2020 3 6     Total Bilirubin 12/09/2020 0 40     eGFR 12/09/2020 88     Ferritin 12/09/2020 200     Folate 12/09/2020 >20 0*    Iron Saturation 12/09/2020 32     TIBC 12/09/2020 294     Iron 12/09/2020 94     Hemoglobin A1C 12/09/2020 5 1     EAG 12/09/2020 100     Zinc 12/09/2020 82     Vit D, 25-Hydroxy 12/09/2020 43 7     Vitamin B-12 12/09/2020 703     Vitamin B1, Whole Blood 12/09/2020 189 6     Vitamin A 12/09/2020 50 6     PTH 12/09/2020 40 3     Cholesterol 12/09/2020 114     Triglycerides 12/09/2020 38     HDL, Direct 12/09/2020 53     LDL Calculated 12/09/2020 53     Non-HDL-Chol (CHOL-HDL) 12/09/2020 61     QFT Nil 12/09/2020 0 02     QFT TB1-NIL 12/09/2020 0 01     QFT TB2-NIL 12/09/2020 0 00     QFT Mitogen-NIL 12/09/2020 >10 00     QFT Final Interpretation 12/09/2020 Negative      I have personally reviewed all pertinent laboratory/tests results      Suicide/Homicide Risk Assessment:    Risk of Harm to Self:  The following ratings are based on assessment at the time of the interview  Protective Factors: no current suicidal ideation, access to mental health treatment, being a parent, being , compliant with medications, compliant with mental health treatment  Based on today's assessment, Ricky Field presents the following risk of harm to self: none    Risk of Harm to Others: The following ratings are based on assessment at the time of the interview  Protective Factors: no current homicidal ideation  Based on today's assessment, Tavon Coker presents the following risk of harm to others: none    The following interventions are recommended: no intervention changes needed    Medications Risks/Benefits:      Risks, Benefits And Possible Side Effects Of Medications:    Discussed risks and benefits of treatment with patient including risk of suicidality, serotonin syndrome and SIADH related to treatment with antidepressants; Risk of induction of manic symptoms in certain patient populations     Controlled Medication Discussion:     No records found for controlled prescriptions according to [place] Prescription Drug Monitoring Program    Treatment Plan:    Due for update/Updated:   no  Last treatment plan done 6/16/21 by LUCRECIA Wagner  Treatment Plan due on 12/16/21  LUCRECIA Orozco 06/23/21    This note was shared with patient

## 2021-06-24 ENCOUNTER — TELEPHONE (OUTPATIENT)
Dept: PSYCHIATRY | Facility: CLINIC | Age: 36
End: 2021-06-24

## 2021-07-12 ENCOUNTER — TELEPHONE (OUTPATIENT)
Dept: FAMILY MEDICINE CLINIC | Facility: CLINIC | Age: 36
End: 2021-07-12

## 2021-07-12 ENCOUNTER — CLINICAL SUPPORT (OUTPATIENT)
Dept: BARIATRICS | Facility: CLINIC | Age: 36
End: 2021-07-12
Payer: COMMERCIAL

## 2021-07-12 DIAGNOSIS — E53.8 LOW VITAMIN B12 LEVEL: Primary | ICD-10-CM

## 2021-07-12 RX ADMIN — CYANOCOBALAMIN 1000 MCG: 1000 INJECTION INTRAMUSCULAR; SUBCUTANEOUS at 08:51

## 2021-07-14 NOTE — TELEPHONE ENCOUNTER
She needs orders form you to have the labs drawn that you want    The ones in there are not due yet for the other physician

## 2021-07-21 ENCOUNTER — OFFICE VISIT (OUTPATIENT)
Dept: PSYCHIATRY | Facility: CLINIC | Age: 36
End: 2021-07-21
Payer: COMMERCIAL

## 2021-07-21 DIAGNOSIS — F41.1 GENERALIZED ANXIETY DISORDER: Primary | ICD-10-CM

## 2021-07-21 PROCEDURE — 99214 OFFICE O/P EST MOD 30 MIN: CPT | Performed by: NURSE PRACTITIONER

## 2021-07-21 RX ORDER — PAROXETINE 30 MG/1
30 TABLET, FILM COATED ORAL EVERY MORNING
Qty: 90 TABLET | Refills: 0 | Status: SHIPPED | OUTPATIENT
Start: 2021-07-21 | End: 2021-09-27 | Stop reason: DRUGHIGH

## 2021-07-21 RX ORDER — HYDROXYZINE HYDROCHLORIDE 25 MG/1
25 TABLET, FILM COATED ORAL EVERY 6 HOURS PRN
Qty: 90 TABLET | Refills: 0 | Status: SHIPPED | OUTPATIENT
Start: 2021-07-21 | End: 2021-11-15 | Stop reason: ALTCHOICE

## 2021-07-22 NOTE — PSYCH
Regular Visit    Problem List Items Addressed This Visit     None      Visit Diagnoses     Generalized anxiety disorder    -  Primary    Relevant Medications    PARoxetine (PAXIL) 30 mg tablet    hydrOXYzine HCL (ATARAX) 25 mg tablet             Encounter provider LUCRECIA Curiel    Provider located at   [place] [address] [address] [id number] [phone]    Recent Visits  No visits were found meeting these conditions  Showing recent visits within past 7 days and meeting all other requirements  Future Appointments  No visits were found meeting these conditions  Showing future appointments within next 150 days and meeting all other requirements       HPI     Current Outpatient Medications   Medication Sig Dispense Refill    acyclovir (ZOVIRAX) 400 MG tablet as needed       calcium citrate-vitamin d (Calcium Citrate Chewy Bite) 500-500 MG-UNIT CHEW chewable tablet Chew 1 tablet 3 (three) times a day      dicyclomine (BENTYL) 20 mg tablet Take 1 tablet (20 mg total) by mouth every 6 (six) hours 30 tablet 3    docusate sodium (COLACE) 100 mg capsule Take 100 mg by mouth 2 (two) times a day      hydrOXYzine HCL (ATARAX) 25 mg tablet Take 1 tablet (25 mg total) by mouth every 6 (six) hours as needed for anxiety 90 tablet 0    MIRENA, 52 MG, 20 MCG/24HR IUD TO BE INSERTED ONE TIME BY PRESCRIBER   ROUTE INTRAUTERINE   0    Multiple Vitamin (MULTI VITAMIN PO) Take 1 tablet by mouth daily       nystatin (MYCOSTATIN) cream Apply topically 2 (two) times a day 30 g 1    ondansetron (ZOFRAN-ODT) 4 mg disintegrating tablet DISSOLVE ONE TABLET BY MOUTH EVERY 6 HOURS AS NEEDED FOR NAUSEA OR VOMITING 30 tablet 0    PARoxetine (PAXIL) 30 mg tablet Take 1 tablet (30 mg total) by mouth every morning 90 tablet 0    Plecanatide (Trulance) 3 MG TABS Take 1 tablet by mouth daily 30 tablet 5    potassium chloride (MICRO-K) 10 MEQ CR capsule Take 10 mEq by mouth      Probiotic Product (PROBIOTIC PO) Take by mouth      traZODone (DESYREL) 100 mg tablet Take 1 tablet (100 mg total) by mouth daily at bedtime as needed for sleep 90 tablet 3     Current Facility-Administered Medications   Medication Dose Route Frequency Provider Last Rate Last Admin    cyanocobalamin injection 1,000 mcg  1,000 mcg Intramuscular Q30 Days Derek Vallejo PA-C   1,000 mcg at 07/12/21 7332       Review of Systems    I spent 30 minutes directly with the patient during this visit      [address] [place] [address]    Name and Date of Birth:  Afshan Bahena 28 y o  1985 MRN: [id number]    Date of Visit: July 21, 2021    No Known Allergies  SUBJECTIVE:    Karol Conteh is seen today for a follow up for Major Depressive Disorder and anxiety  She continues to experience on and off symptoms since the last visit  Karol Conteh last seen by this provider on 06/23/2021  At that time we [facility] the Wellbutrin and increased Paxil 20 mg p o  daily  Today she reports that her anxiety is lessened  Her depression is also decreased  She states that her depression is a 5 to 6/10, anxiety is a 5 to 6/10, 10 being worst symptoms  She denies suicidal thoughts, homicidal thoughts  She denies any auditory or visual hallucinations  She does become irritable when overwhelmed  She reports that they have moved out of their apartment and are now living with her mom and dad, which has been stressful  She reports that her mom has been causing some conflict but she and her  are managing it right now  She states that she has been tired, sleeping on and off getting  6-7 hours a night, but broken sleep  She reports eating well, and she is doing well with school right now and her concentration is adequate  We will increase the Paxil to 30 mg p o  daily, and initiate Atarax q 6 p r n  for anxiety  Follow-up in 2 months      She denies any side effects from psychiatric medications  PLAN:  Increase Paxil to 30 mg p o  daily  She was educated on signs and symptoms of serotonin syndrome and provided verbal understanding  Initiate Atarax 1/2 to 1 tab Q 6 p r n  she will trial the medication in the evening prior to taking during the day  She will assess for sedation  Follow-up in 2 months  She will call sooner with concerns or issues if they arise prior to scheduled appointment  Aware of 24 hour and weekend coverage for urgent situations accessed by calling [facility] main practice number    Diagnoses and all orders for this visit:    Generalized anxiety disorder  -     PARoxetine (PAXIL) 30 mg tablet; Take 1 tablet (30 mg total) by mouth every morning  -     hydrOXYzine HCL (ATARAX) 25 mg tablet; Take 1 tablet (25 mg total) by mouth every 6 (six) hours as needed for anxiety        Current Outpatient Medications on File Prior to Visit   Medication Sig Dispense Refill    acyclovir (ZOVIRAX) 400 MG tablet as needed       calcium citrate-vitamin d (Calcium Citrate Chewy Bite) 500-500 MG-UNIT CHEW chewable tablet Chew 1 tablet 3 (three) times a day      dicyclomine (BENTYL) 20 mg tablet Take 1 tablet (20 mg total) by mouth every 6 (six) hours 30 tablet 3    docusate sodium (COLACE) 100 mg capsule Take 100 mg by mouth 2 (two) times a day      MIRENA, 52 MG, 20 MCG/24HR IUD TO BE INSERTED ONE TIME BY PRESCRIBER   ROUTE INTRAUTERINE   0    Multiple Vitamin (MULTI VITAMIN PO) Take 1 tablet by mouth daily       nystatin (MYCOSTATIN) cream Apply topically 2 (two) times a day 30 g 1    ondansetron (ZOFRAN-ODT) 4 mg disintegrating tablet DISSOLVE ONE TABLET BY MOUTH EVERY 6 HOURS AS NEEDED FOR NAUSEA OR VOMITING 30 tablet 0    Plecanatide (Trulance) 3 MG TABS Take 1 tablet by mouth daily 30 tablet 5    potassium chloride (MICRO-K) 10 MEQ CR capsule Take 10 mEq by mouth      Probiotic Product (PROBIOTIC PO) Take by mouth      traZODone (DESYREL) 100 mg tablet Take 1 tablet (100 mg total) by mouth daily at bedtime as needed for sleep 90 tablet 3     Current Facility-Administered Medications on File Prior to Visit   Medication Dose Route Frequency Provider Last Rate Last Admin    cyanocobalamin injection 1,000 mcg  1,000 mcg Intramuscular Q30 Days Ortega Sinclair PA-C   1,000 mcg at 07/12/21 1441       Psychotherapy Provided:     Individual psychotherapy provided: Yes  Supportive counseling provided  Medications, treatment progress and treatment plan reviewed with Jovan Mccracken  Medication changes discussed with Jovan Mccracken  Medication education provided to Jovan Mccracken  Recent stressors discussed with Jovan Mccracken including family conflict  Importance of medication and treatment compliance reviewed with Jovan Mccracken  Importance of follow up with family physician for medical issues reviewed with Jovan Mccracken  Reassurance and supportive therapy provided  Crisis/safety plan discussed with Jovan Mccracken  HPI ROS Appetite Changes and Sleep:     She reports decrease in number of sleep hours (6-7 hours), normal appetite, adequate energy level   Patient denies suicidal or homicidal ideation    Review Of Systems:    :                                                                             Medication Side Effects:  denies     Depression (10 worst): 6/10 (Was )   Anxiety (10 worst): 7/10 (Was )   Safety concerns (SI, HI, etc): denies (Was denies)   Sleep: 6-7 hrs/night, frequent awakenings (Was )   Energy: Adequate, low motivation (Was )   Appetite: good (Was good)   Weight Change: Dec  In 5 lbs        HPI ROS:               Medication Side Effects:  denies     Depression (10 worst): 5-6/10 (Was 6/10)   Anxiety (10 worst): Five to 6/10 (Was 7/10)   Safety concerns (SI, HI, etc): denies (Was denies)   Sleep: 6-7 hours, broken (Was 6-7 hours, broken)   Energy: Adequate (Was adequate)   Appetite: Good (Was good)   Weight Change: Stable      General normal    Personality no change in personality   Constitutional negative   ENT negative   Cardiovascular negative   Respiratory negative   Gastrointestinal negative   Genitourinary negative   Musculoskeletal negative   Integumentary negative   Neurological negative   Endocrine negative   Other Symptoms none, all other systems are negative     Mental Status Evaluation:    Appearance Adequate hygiene and grooming, dressed in work scrubs   Behavior calm and cooperative   Mood anxious and depressed  Depression Scale - 5-6 of 10 (0 = No depression)  Anxiety Scale - 5-6 of 10 (0 = No anxiety)   Speech Normal rate and volume   Affect appropriate and mood-congruent   Thought Processes Goal directed and coherent   Thought Content Does not verbalize delusional material   Associations Tightly connected   Perceptual Disturbances Denies hallucinations and does not appear to be responding to internal stimuli   Risk Potential Suicidal/Homicidal Ideation - No evidence of suicidal or homicidal ideation and patient does not verbalize suicidal or homicidal ideation  Risk of Violence - No evidence of risk for violence found on assessment  Risk of Self Mutilation - No evidence of risk for self mutilation found on assessment   Orientation oriented to person, place, time/date and situation   Memory recent and remote memory grossly intact   Consciousness alert and awake   Attention/Concentration attention span and concentration are age appropriate   Insight partial   Judgement partial   Muscle Strength and Gait normal muscle strength and normal muscle tone, normal gait/station and normal balance   Motor Activity no abnormal movements   Language no difficulty naming common objects, no difficulty repeating a phrase, no difficulty writing a sentence   Fund of Knowledge adequate knowledge of current events  adequate fund of knowledge regarding past history  adequate fund of knowledge regarding vocabulary      Past Psychiatric History Update:     Inpatient Psychiatric Admission Since Last Encounter:   no  Changes to Outpatient Psychiatric Treatment Team:    no  Suicide Attempt Or Self Mutilation Since Last Encounter:   no  Incidence of Violent Behavior Since Last Encounter:   no    Traumatic History Update:     New Onset of Abuse Since Last Encounter:   no  Traumatic Events Since Last Encounter:   no    Past Medical History:    Past Medical History:   Diagnosis Date    Abnormal Pap smear of cervix     Anxiety     ASCUS with positive high risk HPV cervical 11/6/2018    Back pain     CPAP (continuous positive airway pressure) dependence     Depressed     Depression     Femur fracture (HCC)     Head injury     concussion in 2001 fell off a horse    HPV (human papilloma virus) infection     Hypertension     Hx post surgery stable    Irritable bowel syndrome     Morbid obesity with BMI of 50 0-59 9, adult (HCC)     Obesity     Panic attack     Postgastrectomy malabsorption     Psychiatric illness     PTSD (post-traumatic stress disorder)      Past Medical History Pertinent Negatives:   Diagnosis Date Noted    Seizures ([address] ) 06/16/2021     Past Surgical History:   Procedure Laterality Date    ABDOMINAL SURGERY      Gastric Bypass, December 2019    CHOLECYSTECTOMY LAPAROSCOPIC N/A 10/20/2020    Procedure: CHOLECYSTECTOMY LAPAROSCOPIC W/ INTRAOP CHOLANGIOGRAM;  Surgeon: Lakia Cochran MD;  Location: [facility];  Service: General    COLPOSCOPY  9/2018    EGD      FASCIOTOMY Left     left calf    FEMUR FRACTURE SURGERY Right     MD LAP GASTRIC BYPASS/ROWAN-EN-Y N/A 12/30/2019    Procedure: BYPASS GASTRIC  ROWAN-EN-Y LAPAROSCOPIC WITH INTRAOPERATIVE EGD;  Surgeon: Cisco Casarez MD;  Location: [facility];  Service: Bariatrics    WISDOM TOOTH EXTRACTION       No Known Allergies  Substance Abuse History:    Social History     Substance and Sexual Activity   Alcohol Use Yes    Alcohol/week: 1 0 standard drinks    Types: 1 Glasses of wine per week Comment: social     Social History     Substance and Sexual Activity   Drug Use No     Social History:    Social History     Socioeconomic History    Marital status: /Civil Union     Spouse name: Not on file    Number of children: 2    Years of education: college    Highest education level: Associate degree: occupational, technical, or vocational program   Occupational History    Not on file   Tobacco Use    Smoking status: Former Smoker     Packs/day: 0 00     Years: 0 00     Pack years: 0 00     Types: Cigarettes     Quit date:      Years since quittin 5    Smokeless tobacco: Never Used    Tobacco comment: 0 75 ppd for 2-3 years; History of vaping    Vaping Use    Vaping Use: Former    Substances: Nicotine   Substance and Sexual Activity    Alcohol use: Yes     Alcohol/week: 1 0 standard drinks     Types: 1 Glasses of wine per week     Comment: social    Drug use: No    Sexual activity: Yes     Partners: Male     Birth control/protection: I U D  Comment: mirena inserted 18   Other Topics Concern    Not on file   Social History Narrative    Lives with boyfriend and his kids (part time)     Works at [employer] Strain:     Difficulty of Paying Living Expenses:    Food Insecurity:     Worried About [address] in the Last Year:    [address] in the Last Year:    Transportation Needs:     Lack of Transportation (Medical):      Lack of Transportation (Non-Medical):    Physical Activity:     Days of Exercise per Week:     Minutes of Exercise per Session:    Stress:     Feeling of Stress :    Social Connections:     Frequency of Communication with Friends and Family:     Frequency of Social Gatherings with Friends and Family:     Attends Taoist Services:     Active Member of Clubs or Organizations:     Attends Club or Organization Meetings:     Marital Status:    Intimate Partner Violence: Not At Risk    Fear of Current or Ex-Partner: No    Emotionally Abused: No    Physically Abused: No    Sexually Abused: No     Family Psychiatric History:     Family History   Problem Relation Age of Onset    Rheum arthritis Mother     Fibroids Mother     Obesity Mother     Depression Mother     Hypertension Mother     Substance Abuse Mother     Skin cancer Mother    Edward Sudarshan Cancer Mother     Diabetes Father     Hyperlipidemia Father     Hypertension Father     Thyroid disease Father     Substance Abuse Father     Prostate cancer Father     Gout Father     Cancer Father     Depression Sister     Depression Maternal Grandmother     Diabetes Maternal Grandfather     Depression Maternal Grandfather     Breast cancer Neg Hx     Ovarian cancer Neg Hx     Uterine cancer Neg Hx     Cervical cancer Neg Hx      History Review: The following portions of the patient's history were reviewed and updated as appropriate: allergies, current medications, past family history, past medical history, past social history, past surgical history and problem list     OBJECTIVE:     Vital signs in last 24 hours: There were no vitals filed for this visit  Laboratory Results: I have personally reviewed all pertinent laboratory/tests results  Suicide/Homicide Risk Assessment:    Risk of Harm to Self:  The following ratings are based on assessment at the time of the interview  Protective Factors: no current suicidal ideation, being a parent, being , compliant with medications, compliant with mental health treatment, effective coping skills  Based on today's assessment, Radha Downing presents the following risk of harm to self: none    Risk of Harm to Others:   The following ratings are based on assessment at the time of the interview  Protective Factors: no current homicidal ideation  Based on today's assessment, Radha Downing presents the following risk of harm to others: none    The following interventions are recommended: no intervention changes needed    Medications Risks/Benefits:      Risks, Benefits And Possible Side Effects Of Medications:    Discussed risks and benefits of treatment with patient including risk of suicidality, serotonin syndrome and SIADH related to treatment with antidepressants; Risk of induction of manic symptoms in certain patient populations     Controlled Medication Discussion:     No records found for controlled prescriptions according to [place] Prescription Drug Monitoring Program    Treatment Plan:    Due for update/Updated:   yes  Last treatment plan done 6/16/21 by LUCRECIA Jacques  Treatment Plan due on 12/16/21  LUCRECIA [place] 07/22/21    This note was shared with patient

## 2021-07-30 ENCOUNTER — APPOINTMENT (OUTPATIENT)
Dept: LAB | Facility: CLINIC | Age: 36
End: 2021-07-30
Payer: COMMERCIAL

## 2021-07-30 DIAGNOSIS — F41.9 ANXIETY: ICD-10-CM

## 2021-07-30 DIAGNOSIS — Z13.220 ENCOUNTER FOR SCREENING FOR LIPID DISORDER: ICD-10-CM

## 2021-07-30 LAB
ANION GAP SERPL CALCULATED.3IONS-SCNC: 3 MMOL/L (ref 4–13)
AST SERPL W P-5'-P-CCNC: 20 U/L (ref 5–45)
BUN SERPL-MCNC: 16 MG/DL (ref 5–25)
CALCIUM SERPL-MCNC: 8.6 MG/DL (ref 8.3–10.1)
CHLORIDE SERPL-SCNC: 110 MMOL/L (ref 100–108)
CHOLEST SERPL-MCNC: 114 MG/DL (ref 50–200)
CO2 SERPL-SCNC: 29 MMOL/L (ref 21–32)
CREAT SERPL-MCNC: 0.76 MG/DL (ref 0.6–1.3)
GFR SERPL CREATININE-BSD FRML MDRD: 102 ML/MIN/1.73SQ M
GLUCOSE P FAST SERPL-MCNC: 86 MG/DL (ref 65–99)
HDLC SERPL-MCNC: 61 MG/DL
LDLC SERPL CALC-MCNC: 43 MG/DL (ref 0–100)
NONHDLC SERPL-MCNC: 53 MG/DL
POTASSIUM SERPL-SCNC: 4.3 MMOL/L (ref 3.5–5.3)
SODIUM SERPL-SCNC: 142 MMOL/L (ref 136–145)
TRIGL SERPL-MCNC: 50 MG/DL
TSH SERPL DL<=0.05 MIU/L-ACNC: 2.32 UIU/ML (ref 0.36–3.74)

## 2021-07-30 PROCEDURE — 80048 BASIC METABOLIC PNL TOTAL CA: CPT

## 2021-07-30 PROCEDURE — 84450 TRANSFERASE (AST) (SGOT): CPT

## 2021-07-30 PROCEDURE — 80061 LIPID PANEL: CPT

## 2021-07-30 PROCEDURE — 84443 ASSAY THYROID STIM HORMONE: CPT

## 2021-08-02 ENCOUNTER — TELEPHONE (OUTPATIENT)
Dept: PSYCHIATRY | Facility: CLINIC | Age: 36
End: 2021-08-02

## 2021-08-02 ENCOUNTER — OFFICE VISIT (OUTPATIENT)
Dept: FAMILY MEDICINE CLINIC | Facility: CLINIC | Age: 36
End: 2021-08-02
Payer: COMMERCIAL

## 2021-08-02 VITALS
RESPIRATION RATE: 16 BRPM | DIASTOLIC BLOOD PRESSURE: 80 MMHG | WEIGHT: 205 LBS | BODY MASS INDEX: 29.35 KG/M2 | SYSTOLIC BLOOD PRESSURE: 123 MMHG | OXYGEN SATURATION: 97 % | HEART RATE: 63 BPM | HEIGHT: 70 IN

## 2021-08-02 DIAGNOSIS — F41.9 ANXIETY: Primary | ICD-10-CM

## 2021-08-02 DIAGNOSIS — F33.9 MAJOR DEPRESSION, RECURRENT, CHRONIC (HCC): Primary | ICD-10-CM

## 2021-08-02 PROCEDURE — 99213 OFFICE O/P EST LOW 20 MIN: CPT | Performed by: FAMILY MEDICINE

## 2021-08-02 RX ORDER — PAROXETINE 30 MG/1
30 TABLET, FILM COATED ORAL EVERY MORNING
Qty: 7 TABLET | Refills: 0 | Status: SHIPPED | OUTPATIENT
Start: 2021-08-02 | End: 2021-08-13 | Stop reason: SDUPTHER

## 2021-08-02 NOTE — TELEPHONE ENCOUNTER
Patients Paxil was not yet delivered from her mail order  She has been off of the medication for 4-5 days now  Patient was asking for a weeks supply sent to the  [facility] [place]

## 2021-08-02 NOTE — PROGRESS NOTES
Patient needed 7 day supply of paxil until her mail ordered supply arrives  Ordered and sent to [facility] pharmacy

## 2021-08-02 NOTE — PROGRESS NOTES
BMI Counseling: Body mass index is 29 41 kg/m²  The BMI is above normal  Nutrition recommendations include decreasing portion sizes, decreasing fast food intake, limiting drinks that contain sugar, moderation in carbohydrate intake, increasing intake of lean protein, reducing intake of saturated and trans fat and reducing intake of cholesterol  Exercise recommendations include moderate physical activity 150 minutes/week and exercising 3-5 times per week  No pharmacotherapy was ordered  Depression Screening and Follow-up Plan: Clincally patient does not have depression  No treatment is required  Assessment/Plan:     Chronic Problems:  No problem-specific Assessment & Plan notes found for this encounter  Visit Diagnosis:  Diagnoses and all orders for this visit:    Anxiety     discussed multiple stressors currently experiencing, work life balance, presently under care of psychiatric  Adjusting therapies in medications, encourage stay the course call for any issues or concerns      Subjective:    Patient ID: Emeka Bourgeois is a 28 y o  female  F/ u  Labs, fatigue  Has multiple things going on   ma  Living with parents , nsg school , 2 jobs   Sleep has silver an issue , at times will take trazodone as needed  Psych has been adjusting  Medications over the past month  Over all feels great ,   Fish oil , probiotic ,   paxil 30 mg started    traz as needed       The following portions of the patient's history were reviewed and updated as appropriate: allergies, current medications, past family history, past medical history, past social history, past surgical history and problem list     Review of Systems   Constitutional: Positive for fatigue  Negative for appetite change, chills, fever and unexpected weight change  HENT: Negative for congestion, dental problem, ear pain, hearing loss, postnasal drip, rhinorrhea, sinus pressure, sinus pain, sneezing, sore throat, tinnitus and voice change  Eyes: Negative for visual disturbance  Respiratory: Negative for apnea, cough, chest tightness and shortness of breath  Cardiovascular: Negative for chest pain, palpitations and leg swelling  Gastrointestinal: Negative for abdominal pain, blood in stool, constipation, diarrhea, nausea and vomiting  Endocrine: Negative for cold intolerance, heat intolerance, polydipsia, polyphagia and polyuria  Genitourinary: Negative for decreased urine volume, difficulty urinating, dysuria, frequency and hematuria  Musculoskeletal: Negative for arthralgias, back pain, gait problem, joint swelling and myalgias  Skin: Negative for color change, rash and wound  Allergic/Immunologic: Negative for environmental allergies and food allergies  Neurological: Negative for dizziness, syncope, weakness, light-headedness, numbness and headaches  Hematological: Negative for adenopathy  Does not bruise/bleed easily  Psychiatric/Behavioral: Negative for sleep disturbance and suicidal ideas  The patient is not nervous/anxious  /80   Pulse 63   Resp 16   Ht 5' 10" (1 778 m)   Wt 93 kg (205 lb)   SpO2 97%   BMI 29 41 kg/m²   Social History     Socioeconomic History    Marital status: /Civil Union     Spouse name: Not on file    Number of children: 2    Years of education: college    Highest education level: Associate degree: occupational, technical, or vocational program   Occupational History    Not on file   Tobacco Use    Smoking status: Former Smoker     Packs/day: 0 00     Years: 0 00     Pack years: 0 00     Types: Cigarettes     Quit date:      Years since quittin 5    Smokeless tobacco: Never Used    Tobacco comment: 0 75 ppd for 2-3 years; History of vaping    Vaping Use    Vaping Use: Former    Substances: Nicotine   Substance and Sexual Activity    Alcohol use:  Yes     Alcohol/week: 1 0 standard drinks     Types: 1 Glasses of wine per week     Comment: social    Drug use: No    Sexual activity: Yes     Partners: Male     Birth control/protection: I U D  Comment: mirena inserted 9/25/18   Other Topics Concern    Not on file   Social History Narrative    Lives with boyfriend and his kids (part time)     Works at [employer] [address] [employer] Strain:     Difficulty of Paying Living Expenses:    Food Insecurity:     Worried About [address] in the Last Year:    [address] in the Last Year:    Transportation Needs:     Lack of Transportation (Medical):      Lack of Transportation (Non-Medical):    Physical Activity:     Days of Exercise per Week:     Minutes of Exercise per Session:    Stress:     Feeling of Stress :    Social Connections:     Frequency of Communication with Friends and Family:     Frequency of Social Gatherings with Friends and Family:     Attends [employer] Services:     Active Member of Clubs or Organizations:     Attends Club or Organization Meetings:     Marital Status:    Intimate Partner Violence: Not At Risk    Fear of Current or Ex-Partner: No    Emotionally Abused: No    Physically Abused: No    Sexually Abused: No     Past Medical History:   Diagnosis Date    Abnormal Pap smear of cervix     Anxiety     ASCUS with positive high risk HPV cervical 11/6/2018    Back pain     CPAP (continuous positive airway pressure) dependence     Depressed     Depression     [facility] fracture [facility] [address] [facility]     Head injury     concussion in 2001 fell off a horse    HPV (human papilloma virus) infection     Hypertension     Hx post surgery stable    Irritable bowel syndrome     Morbid obesity with BMI of 50 0-59 9, adult ([facility] [address] )     Obesity     Panic attack     Postgastrectomy malabsorption     Psychiatric illness     PTSD (post-traumatic stress disorder)      Family History   Problem Relation Age of Onset    Rheum arthritis Mother     Fibroids Mother     Obesity Mother     Depression Mother    Steve Capellan Hypertension Mother     Substance Abuse Mother     Skin cancer Mother    Nallely George Cancer Mother     Diabetes Father     Hyperlipidemia Father     Hypertension Father     Thyroid disease Father     Substance Abuse Father     Prostate cancer Father     Gout Father     Cancer Father     Depression Sister     Depression Maternal Grandmother     Diabetes Maternal Grandfather     Depression Maternal Grandfather     Breast cancer Neg Hx     Ovarian cancer Neg Hx     Uterine cancer Neg Hx     Cervical cancer Neg Hx      Past Surgical History:   Procedure Laterality Date    ABDOMINAL SURGERY      Gastric Bypass, December 2019    CHOLECYSTECTOMY LAPAROSCOPIC N/A 10/20/2020    Procedure: CHOLECYSTECTOMY LAPAROSCOPIC W/ INTRAOP CHOLANGIOGRAM;  Surgeon: Wyline Schilder, MD;  Location: [facility];  Service: General    COLPOSCOPY  9/2018    EGD      FASCIOTOMY Left     left calf    FEMUR FRACTURE SURGERY Right     [place] LAP GASTRIC BYPASS/ROWAN-EN-Y N/A 12/30/2019    Procedure: BYPASS GASTRIC  ROWAN-EN-Y LAPAROSCOPIC WITH INTRAOPERATIVE EGD;  Surgeon: Julius Redmond MD;  Location: [facility];  Service: Bariatrics    WISDOM TOOTH EXTRACTION         Current Outpatient Medications:     acyclovir (ZOVIRAX) 400 MG tablet, as needed , Disp: , Rfl:     calcium citrate-vitamin d (Calcium Citrate Chewy Bite) 500-500 MG-UNIT CHEW chewable tablet, Chew 1 tablet 3 (three) times a day, Disp: , Rfl:     dicyclomine (BENTYL) 20 mg tablet, Take 1 tablet (20 mg total) by mouth every 6 (six) hours, Disp: 30 tablet, Rfl: 3    docusate sodium (COLACE) 100 mg capsule, Take 100 mg by mouth 2 (two) times a day, Disp: , Rfl:     hydrOXYzine HCL (ATARAX) 25 mg tablet, Take 1 tablet (25 mg total) by mouth every 6 (six) hours as needed for anxiety, Disp: 90 tablet, Rfl: 0    MIRENA, 52 MG, 20 MCG/24HR IUD, TO BE INSERTED ONE TIME BY PRESCRIBER   ROUTE INTRAUTERINE , Disp: , Rfl: 0    Multiple Vitamin (MULTI VITAMIN PO), Take 1 tablet by mouth daily , Disp: , Rfl:     nystatin (MYCOSTATIN) cream, Apply topically 2 (two) times a day, Disp: 30 g, Rfl: 1    ondansetron (ZOFRAN-ODT) 4 mg disintegrating tablet, DISSOLVE ONE TABLET BY MOUTH EVERY 6 HOURS AS NEEDED FOR NAUSEA OR VOMITING, Disp: 30 tablet, Rfl: 0    PARoxetine (PAXIL) 30 mg tablet, Take 1 tablet (30 mg total) by mouth every morning, Disp: 90 tablet, Rfl: 0    PARoxetine (PAXIL) 30 mg tablet, Take 1 tablet (30 mg total) by mouth every morning for 7 days, Disp: 7 tablet, Rfl: 0    Plecanatide (Trulance) 3 MG TABS, Take 1 tablet by mouth daily, Disp: 30 tablet, Rfl: 5    potassium chloride (MICRO-K) 10 MEQ CR capsule, Take 10 mEq by mouth, Disp: , Rfl:     Probiotic Product (PROBIOTIC PO), Take by mouth, Disp: , Rfl:     traZODone (DESYREL) 100 mg tablet, Take 1 tablet (100 mg total) by mouth daily at bedtime as needed for sleep, Disp: 90 tablet, Rfl: 3    Current Facility-Administered Medications:     cyanocobalamin injection 1,000 mcg, 1,000 mcg, Intramuscular, Q30 Days, Samreen Wilhelm PA-C, 1,000 mcg at 07/12/21 0851    No Known Allergies       Lab Review   Appointment on 07/30/2021   Component Date Value    TSH 3RD GENERATON 07/30/2021 2 320     Sodium 07/30/2021 142     Potassium 07/30/2021 4 3     Chloride 07/30/2021 110*    CO2 07/30/2021 29     ANION GAP 07/30/2021 3*    BUN 07/30/2021 16     Creatinine 07/30/2021 0 76     Glucose, Fasting 07/30/2021 86     Calcium 07/30/2021 8 6     eGFR 07/30/2021 102     Cholesterol 07/30/2021 114     Triglycerides 07/30/2021 50     HDL, Direct 07/30/2021 61     LDL Calculated 07/30/2021 43     Non-HDL-Chol (CHOL-HDL) 07/30/2021 53     AST 07/30/2021 20    Transcribe Orders on 07/30/2021   Component Date Value    Hemoglobin A1C 07/30/2021 5 0     EAG 07/30/2021 97    Orders Only on 07/15/2021   Component Date Value    ALT 07/30/2021 45         Imaging: No results found      Objective:     Physical Exam  Constitutional: General: She is not in acute distress  Appearance: She is well-developed  She is not ill-appearing  HENT:      Head: Normocephalic and atraumatic  Cardiovascular:      Rate and Rhythm: Normal rate  Pulmonary:      Effort: Pulmonary effort is normal    Musculoskeletal:         General: Normal range of motion  Cervical back: Normal range of motion and neck supple  Lymphadenopathy:      Cervical: No cervical adenopathy  Skin:     General: Skin is warm and dry  Neurological:      Mental Status: She is alert and oriented to person, place, and time  Deep Tendon Reflexes: Reflexes are normal and symmetric  Psychiatric:         Mood and Affect: Mood normal          Behavior: Behavior normal          Thought Content: Thought content normal          Judgment: Judgment normal            There are no Patient Instructions on file for this visit  LUCRECIA Segal    Portions of the record may have been created with voice recognition software  Occasional wrong word or "sound a like" substitutions may have occurred due to the inherent limitations of voice recognition software  Read the chart carefully and recognize, using context, where substitutions have occurred

## 2021-08-10 ENCOUNTER — CLINICAL SUPPORT (OUTPATIENT)
Dept: BARIATRICS | Facility: CLINIC | Age: 36
End: 2021-08-10

## 2021-08-10 ENCOUNTER — TELEPHONE (OUTPATIENT)
Dept: PSYCHIATRY | Facility: CLINIC | Age: 36
End: 2021-08-10

## 2021-08-10 DIAGNOSIS — E53.8 VITAMIN B12 DEFICIENCY: Primary | ICD-10-CM

## 2021-08-10 RX ADMIN — CYANOCOBALAMIN 1000 MCG: 1000 INJECTION INTRAMUSCULAR; SUBCUTANEOUS at 10:15

## 2021-08-13 DIAGNOSIS — K59.00 CONSTIPATION, UNSPECIFIED CONSTIPATION TYPE: ICD-10-CM

## 2021-08-13 RX ORDER — PLECANATIDE 3 MG/1
1 TABLET ORAL DAILY
Qty: 30 TABLET | Refills: 0 | Status: SHIPPED | OUTPATIENT
Start: 2021-08-13 | End: 2021-09-07 | Stop reason: SDUPTHER

## 2021-08-17 ENCOUNTER — OFFICE VISIT (OUTPATIENT)
Dept: BARIATRICS | Facility: CLINIC | Age: 36
End: 2021-08-17
Payer: COMMERCIAL

## 2021-08-17 VITALS
WEIGHT: 201.3 LBS | SYSTOLIC BLOOD PRESSURE: 112 MMHG | BODY MASS INDEX: 28.82 KG/M2 | HEART RATE: 63 BPM | HEIGHT: 70 IN | DIASTOLIC BLOOD PRESSURE: 80 MMHG

## 2021-08-17 DIAGNOSIS — E66.3 OVERWEIGHT: Primary | ICD-10-CM

## 2021-08-17 DIAGNOSIS — Z98.84 RECENT WEIGHT GAIN AFTER SURGICAL THERAPY FOR OBESITY: ICD-10-CM

## 2021-08-17 DIAGNOSIS — R63.5 RECENT WEIGHT GAIN AFTER SURGICAL THERAPY FOR OBESITY: ICD-10-CM

## 2021-08-17 DIAGNOSIS — F33.42 RECURRENT MAJOR DEPRESSIVE DISORDER, IN FULL REMISSION (HCC): ICD-10-CM

## 2021-08-17 DIAGNOSIS — G47.33 OBSTRUCTIVE SLEEP APNEA (ADULT) (PEDIATRIC): ICD-10-CM

## 2021-08-17 DIAGNOSIS — K91.2 POSTSURGICAL MALABSORPTION: ICD-10-CM

## 2021-08-17 PROCEDURE — 99215 OFFICE O/P EST HI 40 MIN: CPT | Performed by: INTERNAL MEDICINE

## 2021-08-17 RX ORDER — TOPIRAMATE 50 MG/1
TABLET, FILM COATED ORAL
Qty: 30 TABLET | Refills: 0 | Status: SHIPPED | OUTPATIENT
Start: 2021-08-17 | End: 2021-09-07 | Stop reason: SDUPTHER

## 2021-08-17 NOTE — PROGRESS NOTES
Assessment/Plan:  Dunia Crowell was seen today for consult  Diagnoses and all orders for this visit:    Overweight  -     topiramate (TOPAMAX) 50 MG tablet; Take half tablet at night for 1 week, then take 1 tablet at night    Postsurgical malabsorption    Obstructive sleep apnea (adult) (pediatric)    Recurrent major depressive disorder, in full remission ([facility] [address] )    Recent weight gain after surgical therapy for obesity  -     topiramate (TOPAMAX) 50 MG tablet; Take half tablet at night for 1 week, then take 1 tablet at night       Mood stable on Paxil   Try to add more calories for B/L, 400 vinayak each  Start topamax; discussed the SE including teratogenicity  Pt has an IUD  Signed the consent form  Continue bariatric vitamins    Follow up in approximately 2 months with Non-Surgical Physician/Advanced Practitioner  Subjective:   Chief Complaint   Patient presents with    Consult     Patient is here for initial MWM consult with Dr Slick Davis  BMI 28 88  Patient diagnosed with mild CELE after bariatric surgery but has no CPAP  Patient ID: Jaycee Morgan  is a 28 y o  female with excess weight/obesity here to pursue weight management    Patient is pursuing Conservative Program      HPI  -Initial weight loss goal of 5-10% weight loss for improved health  Wt Readings from Last 10 Encounters:   08/17/21 91 3 kg (201 lb 4 8 oz)   08/02/21 93 kg (205 lb)   06/10/21 96 2 kg (212 lb)   05/25/21 94 5 kg (208 lb 7 oz)   05/05/21 96 6 kg (213 lb)   01/07/21 99 kg (218 lb 3 2 oz)   01/06/21 98 6 kg (217 lb 6 4 oz)   12/16/20 98 9 kg (218 lb)   12/09/20 98 3 kg (216 lb 12 8 oz)   10/29/20 103 kg (226 lb)     S/p RYGB 12/2019  Initial weight at the time of surgery: 355 lbs  Hi weight: 199 lbs    Current weight: 201 lbs starting MWM (8/2021)  Change in weight: ---  Goal: 170 lbs    1000 calories during the day, not including the snacks at night    B- protein shake  S-  L- protein shake   S-   D- protein veggies, small starch  S- cookies (stopped buying)  Drinks- 64 oz water or more  1 cup of coffee  Alcohol- no  Sleep - not using CPAP currently as the repeat study showed mild CELE   Exercise- walking 1-2 miles a day      The following portions of the patient's history were reviewed and updated as appropriate: allergies, current medications, past family history, past medical history, past social history, past surgical history, and problem list     Review of Systems   Constitutional: Negative for appetite change, chills and fever  HENT: Negative for rhinorrhea and sore throat  Respiratory: Negative for cough, chest tightness and shortness of breath  Cardiovascular: Negative for chest pain and leg swelling  Gastrointestinal: Positive for constipation (chronic)  Negative for abdominal pain, diarrhea, nausea and vomiting  Endocrine: Negative for cold intolerance and heat intolerance  Genitourinary: Negative for difficulty urinating  Musculoskeletal: Negative for arthralgias  Skin: Negative for color change  Neurological: Negative for dizziness, numbness and headaches  Psychiatric/Behavioral: Negative for sleep disturbance  The patient is not nervous/anxious  All other systems reviewed and are negative  Objective:  /80 (BP Location: Left arm, Patient Position: Sitting, Cuff Size: Standard)   Pulse 63   Ht 5' 10" (1 778 m)   Wt 91 3 kg (201 lb 4 8 oz)   BMI 28 88 kg/m²   Constitutional: Well-developed, well-nourished and obese Body mass index is 28 88 kg/m²  James Rm HEENT: No conjunctival pallor or jaundice  Pulmonary: No increased work of breathing or signs of respiratory distress  CV: Normal rate, well-perfused  GI: Obese  Non-distended  MSK: No edema   Neuro: Oriented to person, place and time  Normal Speech  Normal gait  Psych: Normal affect and mood       Labs and Imaging  Reviewed

## 2021-08-17 NOTE — PATIENT INSTRUCTIONS
Potential side effects of topiramate include but are not limited to cognitive dysfunction such as forgetfulness, fatigue, upper respiratory infection symptoms, constipation, abdominal upset, numbness/tingling, dizziness, worsening anxiety or depression, fetal defects and rarely kidney stones

## 2021-09-07 DIAGNOSIS — K59.00 CONSTIPATION, UNSPECIFIED CONSTIPATION TYPE: ICD-10-CM

## 2021-09-08 RX ORDER — PLECANATIDE 3 MG/1
1 TABLET ORAL DAILY
Qty: 90 TABLET | Refills: 3 | Status: SHIPPED | OUTPATIENT
Start: 2021-09-08 | End: 2021-10-14

## 2021-09-09 RX ADMIN — CYANOCOBALAMIN 1000 MCG: 1000 INJECTION INTRAMUSCULAR; SUBCUTANEOUS at 09:49

## 2021-09-14 DIAGNOSIS — K91.2 POSTSURGICAL MALABSORPTION: Primary | ICD-10-CM

## 2021-09-14 RX ORDER — CYANOCOBALAMIN 1000 UG/ML
1000 INJECTION INTRAMUSCULAR; SUBCUTANEOUS
Status: SHIPPED | OUTPATIENT
Start: 2021-09-14

## 2021-09-21 ENCOUNTER — CLINICAL SUPPORT (OUTPATIENT)
Dept: FAMILY MEDICINE CLINIC | Facility: CLINIC | Age: 36
End: 2021-09-21
Payer: COMMERCIAL

## 2021-09-21 DIAGNOSIS — Z23 FLU VACCINE NEED: Primary | ICD-10-CM

## 2021-09-21 PROCEDURE — 90682 RIV4 VACC RECOMBINANT DNA IM: CPT

## 2021-09-21 PROCEDURE — 90471 IMMUNIZATION ADMIN: CPT

## 2021-09-27 ENCOUNTER — TELEMEDICINE (OUTPATIENT)
Dept: PSYCHIATRY | Facility: CLINIC | Age: 36
End: 2021-09-27
Payer: COMMERCIAL

## 2021-09-27 DIAGNOSIS — F41.1 GENERALIZED ANXIETY DISORDER: Primary | ICD-10-CM

## 2021-09-27 DIAGNOSIS — F33.9 MAJOR DEPRESSION, RECURRENT, CHRONIC (HCC): ICD-10-CM

## 2021-09-27 PROCEDURE — 99214 OFFICE O/P EST MOD 30 MIN: CPT | Performed by: NURSE PRACTITIONER

## 2021-09-27 RX ORDER — PAROXETINE HYDROCHLORIDE 40 MG/1
40 TABLET, FILM COATED ORAL EVERY MORNING
Qty: 90 TABLET | Refills: 0 | Status: SHIPPED | OUTPATIENT
Start: 2021-09-27 | End: 2021-11-15 | Stop reason: DRUGHIGH

## 2021-09-27 NOTE — PSYCH
Virtual Regular Visit    Verification of patient location:    Patient is located in the following state in which I hold an active license [place]    Problem List Items Addressed This Visit     None      Visit Diagnoses     Generalized anxiety disorder    -  Primary    Relevant Medications    PARoxetine (PAXIL) 40 MG tablet    Major depression, recurrent, chronic (HCC)        Relevant Medications    PARoxetine (PAXIL) 40 MG tablet             Encounter provider Aloha Sicard, CRNP    Provider located at   [address] [address] [phone]    Recent Visits  No visits were found meeting these conditions  Showing recent visits within past 7 days and meeting all other requirements  Future Appointments  No visits were found meeting these conditions  Showing future appointments within next 150 days and meeting all other requirements         The patient was identified by name and date of birth  Donn Espinoza was informed that this is a telemedicine visit and that the visit is being conducted throughKash and patient was informed that this is a secure, HIPAA-compliant platform  She agrees to proceed     My office door was closed  No one else was in the room  She acknowledged consent and understanding of privacy and security of the video platform  The patient has agreed to participate and understands they can discontinue the visit at any time  Patient is aware this is a billable service       HPI     Current Outpatient Medications   Medication Sig Dispense Refill    acyclovir (ZOVIRAX) 400 MG tablet as needed       calcium citrate-vitamin d (Calcium Citrate Chewy Bite) 500-500 MG-UNIT CHEW chewable tablet Chew 1 tablet 3 (three) times a day      dicyclomine (BENTYL) 20 mg tablet Take 1 tablet (20 mg total) by mouth every 6 (six) hours (Patient not taking: Reported on 8/17/2021) 30 tablet 3    docusate sodium (COLACE) 100 mg capsule Take 100 mg by mouth 2 (two) times a day      hydrOXYzine HCL (ATARAX) 25 mg tablet Take 1 tablet (25 mg total) by mouth every 6 (six) hours as needed for anxiety 90 tablet 0    MIRENA, 52 MG, 20 MCG/24HR IUD TO BE INSERTED ONE TIME BY PRESCRIBER  ROUTE INTRAUTERINE   0    Multiple Vitamin (MULTI VITAMIN PO) Take 1 tablet by mouth daily       nystatin (MYCOSTATIN) cream Apply topically 2 (two) times a day 30 g 1    ondansetron (ZOFRAN-ODT) 4 mg disintegrating tablet DISSOLVE ONE TABLET BY MOUTH EVERY 6 HOURS AS NEEDED FOR NAUSEA OR VOMITING (Patient not taking: Reported on 8/17/2021) 30 tablet 0    PARoxetine (PAXIL) 40 MG tablet Take 1 tablet (40 mg total) by mouth every morning 90 tablet 0    Plecanatide (Trulance) 3 MG TABS Take 1 tablet by mouth daily 90 tablet 3    potassium chloride (MICRO-K) 10 MEQ CR capsule Take 10 mEq by mouth      Probiotic Product (PROBIOTIC PO) Take by mouth      topiramate (TOPAMAX) 50 MG tablet Take one and a half tablet at night for a total of 75mg 135 tablet 1    traZODone (DESYREL) 100 mg tablet Take 1 tablet (100 mg total) by mouth daily at bedtime as needed for sleep 90 tablet 3     Current Facility-Administered Medications   Medication Dose Route Frequency Provider Last Rate Last Admin    cyanocobalamin injection 1,000 mcg  1,000 mcg Intramuscular Q30 Days Rafi Horn MD   1,000 mcg at 09/09/21 7870       Review of Systems  Video Exam    There were no vitals filed for this visit  Physical Exam   As a result of this visit, I have referred the patient for further respiratory evaluation  No    I spent 30 minutes directly with the patient during this visit  [address] acknowledges that she has consented to an online visit or consultation   She understands that the online visit is based solely on information provided by her, and that, in the absence of a face-to-face physical evaluation by the physician, the diagnosis she receives is both limited and provisional in terms of accuracy and completeness  This is not intended to replace a full medical face-to-face evaluation by the physician  Gregorio Garcia understands and accepts these terms  MEDICATION MANAGEMENT NOTE        [address]    Name and Date of Birth:  Gregorio Garcia 28 y o  1985 MRN: [id number]    Date of Visit: September 27, 2021    No Known Allergies  SUBJECTIVE:    Lenny Lundy is seen today for a follow up for Major Depressive Disorder  She continues to decompensate since the last visit  Lenny Lundy last seen by this provider on 7/21/21  She states today that she has had an increase in both anxiety and depression  She reports that she began taking topamax through her weight management program and ever since starting it she has been having slowed mental process and "feeling out of it" and "scatterbrained"  She states that as a result she has had an increase in her depression and anxiety and concerned that her schoolwork will be effected  She rates her depression a 6/10 and anxiety 8/10  She states that she gets 7-8 hours of sleep per night but still feels tired all of the time  She has had fleeting SI, no plan, no intent  She has low motivation, low energy  She denies HI, denies auditory and visual hallucinations  Does not endorse symptoms of shaq or psychosis  Discussed the need to speak to her weight management doctor to potentially d/c the topamax at this time  She is in agreement with this  She denies any side effects from current psychiatric medications  Reports brain fog and confusion from topamax      PLAN:  Increase Paxil to 40mg PO Daily  Continue Trazodone 100mg PO HS  Speak to her weight management provider re: topamax side effects  She will follow up in 2 weeks  She will call sooner if concerns/issues arise prior to scheduled appointment  Aware of 24 hour and weekend coverage for urgent situations accessed by calling [facility] main practice number  Medication management every 2 weeks  Aware of need to follow up with family physician for medical issues    Diagnoses and all orders for this visit:    Generalized anxiety disorder  -     PARoxetine (PAXIL) 40 MG tablet; Take 1 tablet (40 mg total) by mouth every morning    Major depression, recurrent, chronic (HCC)  -     PARoxetine (PAXIL) 40 MG tablet; Take 1 tablet (40 mg total) by mouth every morning        Current Outpatient Medications on File Prior to Visit   Medication Sig Dispense Refill    acyclovir (ZOVIRAX) 400 MG tablet as needed       calcium citrate-vitamin d (Calcium Citrate Chewy Bite) 500-500 MG-UNIT CHEW chewable tablet Chew 1 tablet 3 (three) times a day      dicyclomine (BENTYL) 20 mg tablet Take 1 tablet (20 mg total) by mouth every 6 (six) hours (Patient not taking: Reported on 8/17/2021) 30 tablet 3    docusate sodium (COLACE) 100 mg capsule Take 100 mg by mouth 2 (two) times a day      hydrOXYzine HCL (ATARAX) 25 mg tablet Take 1 tablet (25 mg total) by mouth every 6 (six) hours as needed for anxiety 90 tablet 0    MIRENA, 52 MG, 20 MCG/24HR IUD TO BE INSERTED ONE TIME BY PRESCRIBER   ROUTE INTRAUTERINE   0    Multiple Vitamin (MULTI VITAMIN PO) Take 1 tablet by mouth daily       nystatin (MYCOSTATIN) cream Apply topically 2 (two) times a day 30 g 1    ondansetron (ZOFRAN-ODT) 4 mg disintegrating tablet DISSOLVE ONE TABLET BY MOUTH EVERY 6 HOURS AS NEEDED FOR NAUSEA OR VOMITING (Patient not taking: Reported on 8/17/2021) 30 tablet 0    Plecanatide (Trulance) 3 MG TABS Take 1 tablet by mouth daily 90 tablet 3    potassium chloride (MICRO-K) 10 MEQ CR capsule Take 10 mEq by mouth      Probiotic Product (PROBIOTIC PO) Take by mouth      topiramate (TOPAMAX) 50 MG tablet Take one and a half tablet at night for a total of 75mg 135 tablet 1    traZODone (DESYREL) 100 mg tablet Take 1 tablet (100 mg total) by mouth daily at bedtime as needed for sleep 90 tablet 3    [DISCONTINUED] PARoxetine (PAXIL) 30 mg tablet Take 1 tablet (30 mg total) by mouth every morning 90 tablet 0     Current Facility-Administered Medications on File Prior to Visit   Medication Dose Route Frequency Provider Last Rate Last Admin    cyanocobalamin injection 1,000 mcg  1,000 mcg Intramuscular Q30 Days Inge Whitaker MD   1,000 mcg at 09/09/21 0539       Psychotherapy Provided:     Individual psychotherapy provided: Yes  Supportive counseling provided  Medication changes discussed with Matty Sunshine  Medication education provided to Matty Sunshine  Importance of medication and treatment compliance reviewed with Matty Sunshine  Importance of follow up with family physician for medical issues reviewed with Matty Sunshine  Reassurance and supportive therapy provided  Crisis/safety plan discussed with Matty Sunshine  HPI ROS Appetite Changes and Sleep:     She reports adequate number of sleep hours (7-8 hours), adequate appetite, low energy   Denies homicidal ideation, Intermittent passive suicidal ideation without intent or plan    Review Of Systems:    HPI ROS:               Medication Side Effects:  denies side effects to psych meds - topamax for weight loss causes brain fog     Depression (10 worst): 6-10 (Was 5-6/10)   Anxiety (10 worst): 8/10 (Was 5-6/10)   Safety concerns (SI, HI, etc): Si, no plan no intent (Was denies)   Sleep: 7-8 hours (Was 6-7 hrs broken)   Energy: low (Was adequate)   Appetite: adequate (Was good)     General normal    Personality no change in personality   Constitutional feeling tired, low energy and as noted in HPI   ENT negative   Cardiovascular negative   Respiratory negative   Gastrointestinal negative   Genitourinary negative   Musculoskeletal negative   Integumentary negative   Neurological negative   Endocrine negative   Other Symptoms none, all other systems are negative Mental Status Evaluation:    Appearance Adequate hygiene and grooming   Behavior calm and cooperative   Mood anxious and depressed  Depression Scale - 6 of 10 (0 = No depression)  Anxiety Scale - 8 of 10 (0 = No anxiety)   Speech Normal rate and volume   Affect appropriate and mood-congruent   Thought Processes Goal directed and coherent   Thought Content Does not verbalize delusional material   Associations Tightly connected   Perceptual Disturbances Denies hallucinations and does not appear to be responding to internal stimuli   Risk Potential Suicidal/Homicidal Ideation - Passive suicidal ideation without intent or plan  Risk of Violence - No evidence of risk for violence found on assessment  Risk of Self Mutilation - No evidence of risk for self mutilation found on assessment   Orientation oriented to person, place, time/date and situation   Memory recent and remote memory grossly intact   Consciousness alert and awake   Attention/Concentration attention span and concentration are age appropriate   Insight intact   Judgement intact   Muscle Strength and Gait normal muscle strength and normal muscle tone, normal gait/station and normal balance   Motor Activity unable to assess today due to virtual visit   Language no difficulty naming common objects, no difficulty repeating a phrase, no difficulty writing a sentence   Fund of Knowledge adequate knowledge of current events  adequate fund of knowledge regarding past history  adequate fund of knowledge regarding vocabulary      Past Psychiatric History Update:     Inpatient Psychiatric Admission Since Last Encounter:   no  Changes to Outpatient Psychiatric Treatment Team:    no  Suicide Attempt Or Self Mutilation Since Last Encounter:   no  Incidence of Violent Behavior Since Last Encounter:   no    Traumatic History Update:     New Onset of Abuse Since Last Encounter:   no  Traumatic Events Since Last Encounter:   no    Past Medical History:    Past Medical History:   Diagnosis Date    Abnormal Pap smear of cervix     Anxiety     ASCUS with positive high risk HPV cervical 11/6/2018    Back pain     CPAP (continuous positive airway pressure) dependence     Depressed     Depression     Femur fracture (HCC)     Head injury     concussion in 2001 fell off a horse    HPV (human papilloma virus) infection     Hypertension     Hx post surgery stable    Irritable bowel syndrome     Morbid obesity with BMI of 50 0-59 9, adult (HCC)     Obesity     Panic attack     Postgastrectomy malabsorption     Psychiatric illness     PTSD (post-traumatic stress disorder)      Past Medical History Pertinent Negatives:   Diagnosis Date Noted    Seizures ([facility] [address] ) 06/16/2021     Past Surgical History:   Procedure Laterality Date    ABDOMINAL SURGERY      Gastric Bypass, December 2019    CHOLECYSTECTOMY  10/2020    CHOLECYSTECTOMY LAPAROSCOPIC N/A 10/20/2020    Procedure: CHOLECYSTECTOMY LAPAROSCOPIC W/ INTRAOP CHOLANGIOGRAM;  Surgeon: Wyline Schilder, MD;  Location: [facility];  Service: General    COLPOSCOPY  9/2018    EGD      FASCIOTOMY Left     left calf    FEMUR FRACTURE SURGERY Right     [place] LAP GASTRIC BYPASS/ROWAN-EN-Y N/A 12/30/2019    Procedure: BYPASS GASTRIC  ROWAN-EN-Y LAPAROSCOPIC WITH INTRAOPERATIVE EGD;  Surgeon: Julius Redmond MD;  Location: [facility];  Service: Bariatrics    WISDOM TOOTH EXTRACTION       No Known Allergies  Substance Abuse History:    Social History     Substance and Sexual Activity   Alcohol Use Not Currently    Alcohol/week: 0 0 standard drinks    Comment: social     Social History     Substance and Sexual Activity   Drug Use No     Social History:    Social History     Socioeconomic History    Marital status: /Civil Union     Spouse name: Not on file    Number of children: 2    Years of education: college    Highest education level: Associate degree: occupational, technical, or vocational program   Occupational History  Not on file   Tobacco Use    Smoking status: Former Smoker     Packs/day: 0 00     Years: 0 00     Pack years: 0 00     Types: Cigarettes     Quit date: 2017     Years since quittin 7    Smokeless tobacco: Former User    Tobacco comment: 0 75 ppd for 2-3 years; History of vaping    Vaping Use    Vaping Use: Former    Substances: Nicotine   Substance and Sexual Activity    Alcohol use: Not Currently     Alcohol/week: 0 0 standard drinks     Comment: social    Drug use: No    Sexual activity: Yes     Partners: Male     Birth control/protection: I U D  Comment: mirena inserted 18   Other Topics Concern    Not on file   Social History Narrative    Lives with boyfriend and his kids (part time)     Works at [employer] Strain:     Difficulty of Paying Living Expenses:    Food Insecurity:     Worried About [address] in the Last Year:    [address] in the Last Year:    Transportation Needs:     Lack of Transportation (Medical):      Lack of Transportation (Non-Medical):    Physical Activity:     Days of Exercise per Week:     Minutes of Exercise per Session:    Stress:     Feeling of Stress :    Social Connections:     Frequency of Communication with Friends and Family:     Frequency of Social Gatherings with Friends and Family:     Attends Nondenominational Services:     Active Member of Clubs or Organizations:     Attends Club or Organization Meetings:     Marital Status:    Intimate Partner Violence: Not At Risk    Fear of Current or Ex-Partner: No    Emotionally Abused: No    Physically Abused: No    Sexually Abused: No     Family Psychiatric History:     Family History   Problem Relation Age of Onset    Rheum arthritis Mother     Fibroids Mother     Obesity Mother     Depression Mother     Hypertension Mother     Substance Abuse Mother     Skin cancer Mother     Cancer Mother     Diabetes Father    Corina Pearce Hyperlipidemia Father     Hypertension Father     Thyroid disease Father     Substance Abuse Father     Prostate cancer Father     Gout Father     Cancer Father     Depression Sister     Depression Maternal Grandmother     Diabetes Maternal Grandfather     Depression Maternal Grandfather     Breast cancer Neg Hx     Ovarian cancer Neg Hx     Uterine cancer Neg Hx     Cervical cancer Neg Hx      History Review: The following portions of the patient's history were reviewed and updated as appropriate: allergies, current medications, past family history, past medical history, past social history, past surgical history and problem list     OBJECTIVE:     Vital signs in last 24 hours: There were no vitals filed for this visit  Laboratory Results:   Recent Labs (last 2 months):   Appointment on 07/30/2021   Component Date Value    TSH 3RD GENERATON 07/30/2021 2 320     Sodium 07/30/2021 142     Potassium 07/30/2021 4 3     Chloride 07/30/2021 110*    CO2 07/30/2021 29     ANION GAP 07/30/2021 3*    BUN 07/30/2021 16     Creatinine 07/30/2021 0 76     Glucose, Fasting 07/30/2021 86     Calcium 07/30/2021 8 6     eGFR 07/30/2021 102     Cholesterol 07/30/2021 114     Triglycerides 07/30/2021 50     HDL, Direct 07/30/2021 61     LDL Calculated 07/30/2021 43     Non-HDL-Chol (CHOL-HDL) 07/30/2021 53     AST 07/30/2021 20    Transcribe Orders on 07/30/2021   Component Date Value    Hemoglobin A1C 07/30/2021 5 0     EAG 07/30/2021 97      I have personally reviewed all pertinent laboratory/tests results      Suicide/Homicide Risk Assessment:    Risk of Harm to Self:  The following ratings are based on assessment at the time of the interview  Recent Specific Risk Factors include: current depressive symptoms, current anxiety symptoms, experienced fleeting suicidal ideation, has suicidal ideation without a plan  Demographic risk factors include: none  Historical Risk Factors include: chronic depression, chronic anxiety symptoms  Protective Factors: access to mental health treatment, being , compliant with medications, compliant with mental health treatment, having a desire to be alive, stable job, strong relationships  Based on today's assessment, Randall Flores presents the following risk of harm to self: minimal    Risk of Harm to Others: The following ratings are based on assessment at the time of the interview  Protective Factors: no current homicidal ideation  Based on today's assessment, Randall Flores presents the following risk of harm to others: none    The following interventions are recommended: contracts for safety at present - agrees to go to ED if feeling unsafe, return in 2 weeks for reassessment, contracts for safety at present - agrees to call Crisis Intervention Service if feeling unsafe    Medications Risks/Benefits:      Risks, Benefits And Possible Side Effects Of Medications:    Discussed risks and benefits of treatment with patient including risk of suicidality, serotonin syndrome, increased QTc interval and SIADH related to treatment with antidepressants; Risk of induction of manic symptoms in certain patient populations     Controlled Medication Discussion:     No records found for controlled prescriptions according to [place] Prescription Drug Monitoring Program    Treatment Plan:    Due for update/Updated:   no  Last treatment plan done 6/16/21 by LUCRECIA Garrett  Treatment Plan due on 12/16/21  Deborra Apley, CRNP 09/27/21    This note was shared with patient

## 2021-10-13 ENCOUNTER — OFFICE VISIT (OUTPATIENT)
Dept: BARIATRICS | Facility: CLINIC | Age: 36
End: 2021-10-13
Payer: COMMERCIAL

## 2021-10-13 VITALS
WEIGHT: 194.4 LBS | HEART RATE: 64 BPM | DIASTOLIC BLOOD PRESSURE: 82 MMHG | SYSTOLIC BLOOD PRESSURE: 108 MMHG | BODY MASS INDEX: 27.83 KG/M2 | HEIGHT: 70 IN

## 2021-10-13 DIAGNOSIS — G47.33 OBSTRUCTIVE SLEEP APNEA: ICD-10-CM

## 2021-10-13 DIAGNOSIS — E66.3 OVERWEIGHT: ICD-10-CM

## 2021-10-13 DIAGNOSIS — R63.5 ABNORMAL WEIGHT GAIN: Primary | ICD-10-CM

## 2021-10-13 DIAGNOSIS — K91.2 POSTSURGICAL MALABSORPTION: ICD-10-CM

## 2021-10-13 DIAGNOSIS — F33.42 RECURRENT MAJOR DEPRESSIVE DISORDER, IN FULL REMISSION (HCC): ICD-10-CM

## 2021-10-13 PROBLEM — Z98.84 S/P GASTRIC BYPASS: Status: ACTIVE | Noted: 2021-10-13

## 2021-10-13 PROCEDURE — 99214 OFFICE O/P EST MOD 30 MIN: CPT | Performed by: PHYSICIAN ASSISTANT

## 2021-10-15 ENCOUNTER — TELEPHONE (OUTPATIENT)
Dept: BARIATRICS | Facility: CLINIC | Age: 36
End: 2021-10-15

## 2021-10-29 DIAGNOSIS — R11.0 NAUSEA: ICD-10-CM

## 2021-10-29 DIAGNOSIS — R51.9 HEADACHE: Primary | ICD-10-CM

## 2021-10-29 PROCEDURE — U0003 INFECTIOUS AGENT DETECTION BY NUCLEIC ACID (DNA OR RNA); SEVERE ACUTE RESPIRATORY SYNDROME CORONAVIRUS 2 (SARS-COV-2) (CORONAVIRUS DISEASE [COVID-19]), AMPLIFIED PROBE TECHNIQUE, MAKING USE OF HIGH THROUGHPUT TECHNOLOGIES AS DESCRIBED BY CMS-2020-01-R: HCPCS | Performed by: SURGERY

## 2021-10-29 PROCEDURE — U0005 INFEC AGEN DETEC AMPLI PROBE: HCPCS | Performed by: SURGERY

## 2021-11-03 ENCOUNTER — IMMUNIZATIONS (OUTPATIENT)
Dept: FAMILY MEDICINE CLINIC | Facility: HOSPITAL | Age: 36
End: 2021-11-03

## 2021-11-03 DIAGNOSIS — Z23 ENCOUNTER FOR IMMUNIZATION: Primary | ICD-10-CM

## 2021-11-03 PROCEDURE — 91306 COVID-19 MODERNA VACC 0.25 ML BOOSTER: CPT

## 2021-11-03 PROCEDURE — 0013A COVID-19 MODERNA VACC 0.25 ML BOOSTER: CPT

## 2021-11-12 DIAGNOSIS — G47.00 INSOMNIA, UNSPECIFIED TYPE: ICD-10-CM

## 2021-11-14 RX ORDER — TRAZODONE HYDROCHLORIDE 100 MG/1
100 TABLET ORAL
Qty: 90 TABLET | Refills: 0 | Status: SHIPPED | OUTPATIENT
Start: 2021-11-14 | End: 2022-02-07 | Stop reason: SDUPTHER

## 2021-11-15 ENCOUNTER — TELEMEDICINE (OUTPATIENT)
Dept: PSYCHIATRY | Facility: CLINIC | Age: 36
End: 2021-11-15
Payer: COMMERCIAL

## 2021-11-15 DIAGNOSIS — F41.1 GENERALIZED ANXIETY DISORDER: ICD-10-CM

## 2021-11-15 DIAGNOSIS — F33.42 RECURRENT MAJOR DEPRESSIVE DISORDER, IN FULL REMISSION (HCC): Primary | ICD-10-CM

## 2021-11-15 PROCEDURE — 99214 OFFICE O/P EST MOD 30 MIN: CPT | Performed by: NURSE PRACTITIONER

## 2021-11-15 RX ORDER — PAROXETINE HYDROCHLORIDE 20 MG/1
50 TABLET, FILM COATED ORAL DAILY
Qty: 225 TABLET | Refills: 0 | Status: SHIPPED | OUTPATIENT
Start: 2021-11-15 | End: 2021-12-13 | Stop reason: DRUGHIGH

## 2021-11-15 RX ORDER — BUSPIRONE HYDROCHLORIDE 5 MG/1
5 TABLET ORAL 2 TIMES DAILY
Qty: 180 TABLET | Refills: 0 | Status: SHIPPED | OUTPATIENT
Start: 2021-11-15 | End: 2021-12-13 | Stop reason: DRUGHIGH

## 2021-11-19 ENCOUNTER — TELEPHONE (OUTPATIENT)
Dept: BARIATRICS | Facility: CLINIC | Age: 36
End: 2021-11-19

## 2021-11-19 DIAGNOSIS — K91.2 POSTSURGICAL MALABSORPTION: Primary | ICD-10-CM

## 2021-11-19 DIAGNOSIS — E53.8 LOW VITAMIN B12 LEVEL: ICD-10-CM

## 2021-11-19 RX ORDER — CYANOCOBALAMIN 1000 UG/ML
1000 INJECTION INTRAMUSCULAR; SUBCUTANEOUS
Status: SHIPPED | OUTPATIENT
Start: 2021-11-19 | End: 2022-05-18

## 2021-11-23 ENCOUNTER — APPOINTMENT (OUTPATIENT)
Dept: RADIOLOGY | Facility: CLINIC | Age: 36
End: 2021-11-23
Payer: COMMERCIAL

## 2021-11-23 ENCOUNTER — OFFICE VISIT (OUTPATIENT)
Dept: OBGYN CLINIC | Facility: CLINIC | Age: 36
End: 2021-11-23
Payer: COMMERCIAL

## 2021-11-23 VITALS
HEART RATE: 82 BPM | WEIGHT: 192 LBS | HEIGHT: 70 IN | SYSTOLIC BLOOD PRESSURE: 115 MMHG | BODY MASS INDEX: 27.49 KG/M2 | DIASTOLIC BLOOD PRESSURE: 75 MMHG | RESPIRATION RATE: 18 BRPM

## 2021-11-23 DIAGNOSIS — M25.512 LEFT SHOULDER PAIN, UNSPECIFIED CHRONICITY: ICD-10-CM

## 2021-11-23 DIAGNOSIS — M25.512 LEFT SHOULDER PAIN, UNSPECIFIED CHRONICITY: Primary | ICD-10-CM

## 2021-11-23 DIAGNOSIS — S43.102A DISLOCATION OF LEFT ACROMIOCLAVICULAR JOINT, INITIAL ENCOUNTER: ICD-10-CM

## 2021-11-23 PROCEDURE — 73030 X-RAY EXAM OF SHOULDER: CPT

## 2021-11-23 PROCEDURE — 73000 X-RAY EXAM OF COLLAR BONE: CPT

## 2021-11-23 PROCEDURE — 99204 OFFICE O/P NEW MOD 45 MIN: CPT | Performed by: ORTHOPAEDIC SURGERY

## 2021-11-23 RX ORDER — CHLORHEXIDINE GLUCONATE 4 G/100ML
SOLUTION TOPICAL DAILY PRN
Status: CANCELLED | OUTPATIENT
Start: 2021-11-23

## 2021-11-26 ENCOUNTER — CLINICAL SUPPORT (OUTPATIENT)
Dept: BARIATRICS | Facility: CLINIC | Age: 36
End: 2021-11-26
Payer: COMMERCIAL

## 2021-11-26 DIAGNOSIS — E53.8 VITAMIN B12 DEFICIENCY: Primary | ICD-10-CM

## 2021-11-26 RX ADMIN — CYANOCOBALAMIN 1000 MCG: 1000 INJECTION INTRAMUSCULAR; SUBCUTANEOUS at 15:44

## 2021-11-29 ENCOUNTER — OFFICE VISIT (OUTPATIENT)
Dept: BARIATRICS | Facility: CLINIC | Age: 36
End: 2021-11-29
Payer: COMMERCIAL

## 2021-11-29 VITALS
SYSTOLIC BLOOD PRESSURE: 102 MMHG | BODY MASS INDEX: 26.71 KG/M2 | TEMPERATURE: 98.2 F | WEIGHT: 186.6 LBS | HEART RATE: 84 BPM | RESPIRATION RATE: 12 BRPM | HEIGHT: 70 IN | DIASTOLIC BLOOD PRESSURE: 86 MMHG

## 2021-11-29 DIAGNOSIS — Z98.84 WEIGHT GAIN STATUS POST GASTRIC BYPASS: ICD-10-CM

## 2021-11-29 DIAGNOSIS — F41.9 ANXIETY AND DEPRESSION: ICD-10-CM

## 2021-11-29 DIAGNOSIS — K91.2 POSTSURGICAL MALABSORPTION: ICD-10-CM

## 2021-11-29 DIAGNOSIS — R63.5 WEIGHT GAIN STATUS POST GASTRIC BYPASS: ICD-10-CM

## 2021-11-29 DIAGNOSIS — F32.A ANXIETY AND DEPRESSION: ICD-10-CM

## 2021-11-29 DIAGNOSIS — E66.3 OVERWEIGHT: Primary | ICD-10-CM

## 2021-11-29 DIAGNOSIS — Z98.84 S/P GASTRIC BYPASS: ICD-10-CM

## 2021-11-29 PROCEDURE — 99214 OFFICE O/P EST MOD 30 MIN: CPT | Performed by: INTERNAL MEDICINE

## 2021-11-30 ENCOUNTER — TELEPHONE (OUTPATIENT)
Dept: OBGYN CLINIC | Facility: CLINIC | Age: 36
End: 2021-11-30

## 2021-12-01 ENCOUNTER — TELEPHONE (OUTPATIENT)
Dept: OBGYN CLINIC | Facility: OTHER | Age: 36
End: 2021-12-01

## 2021-12-01 ENCOUNTER — APPOINTMENT (OUTPATIENT)
Dept: LAB | Facility: HOSPITAL | Age: 36
End: 2021-12-01
Payer: COMMERCIAL

## 2021-12-01 ENCOUNTER — OFFICE VISIT (OUTPATIENT)
Dept: FAMILY MEDICINE CLINIC | Facility: CLINIC | Age: 36
End: 2021-12-01
Payer: COMMERCIAL

## 2021-12-01 VITALS
DIASTOLIC BLOOD PRESSURE: 80 MMHG | HEART RATE: 90 BPM | HEIGHT: 70 IN | BODY MASS INDEX: 26.88 KG/M2 | TEMPERATURE: 97.5 F | OXYGEN SATURATION: 97 % | SYSTOLIC BLOOD PRESSURE: 120 MMHG | WEIGHT: 187.8 LBS

## 2021-12-01 DIAGNOSIS — R79.89 ELEVATED LFTS: Primary | ICD-10-CM

## 2021-12-01 DIAGNOSIS — S43.102A DISLOCATION OF LEFT ACROMIOCLAVICULAR JOINT, INITIAL ENCOUNTER: ICD-10-CM

## 2021-12-01 DIAGNOSIS — K91.2 POSTSURGICAL MALABSORPTION: ICD-10-CM

## 2021-12-01 DIAGNOSIS — F33.42 RECURRENT MAJOR DEPRESSIVE DISORDER, IN FULL REMISSION (HCC): ICD-10-CM

## 2021-12-01 DIAGNOSIS — Z11.1 SCREENING-PULMONARY TB: ICD-10-CM

## 2021-12-01 DIAGNOSIS — Z98.84 S/P GASTRIC BYPASS: ICD-10-CM

## 2021-12-01 LAB
25(OH)D3 SERPL-MCNC: 34.7 NG/ML (ref 30–100)
ALBUMIN SERPL BCP-MCNC: 3.7 G/DL (ref 3.5–5)
ALP SERPL-CCNC: 102 U/L (ref 46–116)
ALT SERPL W P-5'-P-CCNC: 150 U/L (ref 12–78)
ANION GAP SERPL CALCULATED.3IONS-SCNC: 7 MMOL/L (ref 4–13)
AST SERPL W P-5'-P-CCNC: 111 U/L (ref 5–45)
BASOPHILS # BLD AUTO: 0.02 THOUSANDS/ΜL (ref 0–0.1)
BASOPHILS NFR BLD AUTO: 0 % (ref 0–1)
BILIRUB SERPL-MCNC: 0.65 MG/DL (ref 0.2–1)
BUN SERPL-MCNC: 15 MG/DL (ref 5–25)
CALCIUM SERPL-MCNC: 8.7 MG/DL (ref 8.3–10.1)
CHLORIDE SERPL-SCNC: 107 MMOL/L (ref 100–108)
CHOLEST SERPL-MCNC: 132 MG/DL
CO2 SERPL-SCNC: 31 MMOL/L (ref 21–32)
CREAT SERPL-MCNC: 0.87 MG/DL (ref 0.6–1.3)
EOSINOPHIL # BLD AUTO: 0.09 THOUSAND/ΜL (ref 0–0.61)
EOSINOPHIL NFR BLD AUTO: 2 % (ref 0–6)
ERYTHROCYTE [DISTWIDTH] IN BLOOD BY AUTOMATED COUNT: 12.2 % (ref 11.6–15.1)
FERRITIN SERPL-MCNC: 226 NG/ML (ref 8–388)
FOLATE SERPL-MCNC: 13 NG/ML (ref 3.1–17.5)
GFR SERPL CREATININE-BSD FRML MDRD: 87 ML/MIN/1.73SQ M
GLUCOSE P FAST SERPL-MCNC: 81 MG/DL (ref 65–99)
HCT VFR BLD AUTO: 43.2 % (ref 34.8–46.1)
HDLC SERPL-MCNC: 75 MG/DL
HGB BLD-MCNC: 13.9 G/DL (ref 11.5–15.4)
IMM GRANULOCYTES # BLD AUTO: 0.01 THOUSAND/UL (ref 0–0.2)
IMM GRANULOCYTES NFR BLD AUTO: 0 % (ref 0–2)
IRON SATN MFR SERPL: 33 % (ref 15–50)
IRON SERPL-MCNC: 106 UG/DL (ref 50–170)
LDLC SERPL CALC-MCNC: 49 MG/DL (ref 0–100)
LYMPHOCYTES # BLD AUTO: 1.97 THOUSANDS/ΜL (ref 0.6–4.47)
LYMPHOCYTES NFR BLD AUTO: 39 % (ref 14–44)
MCH RBC QN AUTO: 30.6 PG (ref 26.8–34.3)
MCHC RBC AUTO-ENTMCNC: 32.2 G/DL (ref 31.4–37.4)
MCV RBC AUTO: 95 FL (ref 82–98)
MONOCYTES # BLD AUTO: 0.3 THOUSAND/ΜL (ref 0.17–1.22)
MONOCYTES NFR BLD AUTO: 6 % (ref 4–12)
NEUTROPHILS # BLD AUTO: 2.69 THOUSANDS/ΜL (ref 1.85–7.62)
NEUTS SEG NFR BLD AUTO: 53 % (ref 43–75)
NONHDLC SERPL-MCNC: 57 MG/DL
NRBC BLD AUTO-RTO: 0 /100 WBCS
PLATELET # BLD AUTO: 229 THOUSANDS/UL (ref 149–390)
PMV BLD AUTO: 10.5 FL (ref 8.9–12.7)
POTASSIUM SERPL-SCNC: 4.3 MMOL/L (ref 3.5–5.3)
PROT SERPL-MCNC: 6.6 G/DL (ref 6.4–8.2)
PTH-INTACT SERPL-MCNC: 64.3 PG/ML (ref 18.4–80.1)
RBC # BLD AUTO: 4.54 MILLION/UL (ref 3.81–5.12)
SODIUM SERPL-SCNC: 145 MMOL/L (ref 136–145)
TIBC SERPL-MCNC: 318 UG/DL (ref 250–450)
TRIGL SERPL-MCNC: 40 MG/DL
VIT B12 SERPL-MCNC: 951 PG/ML (ref 100–900)
WBC # BLD AUTO: 5.08 THOUSAND/UL (ref 4.31–10.16)

## 2021-12-01 PROCEDURE — 80053 COMPREHEN METABOLIC PANEL: CPT

## 2021-12-01 PROCEDURE — 80061 LIPID PANEL: CPT

## 2021-12-01 PROCEDURE — 82607 VITAMIN B-12: CPT

## 2021-12-01 PROCEDURE — 84630 ASSAY OF ZINC: CPT

## 2021-12-01 PROCEDURE — 82306 VITAMIN D 25 HYDROXY: CPT

## 2021-12-01 PROCEDURE — 84425 ASSAY OF VITAMIN B-1: CPT

## 2021-12-01 PROCEDURE — 83970 ASSAY OF PARATHORMONE: CPT

## 2021-12-01 PROCEDURE — 82728 ASSAY OF FERRITIN: CPT

## 2021-12-01 PROCEDURE — 85025 COMPLETE CBC W/AUTO DIFF WBC: CPT

## 2021-12-01 PROCEDURE — 83550 IRON BINDING TEST: CPT

## 2021-12-01 PROCEDURE — 99244 OFF/OP CNSLTJ NEW/EST MOD 40: CPT | Performed by: FAMILY MEDICINE

## 2021-12-01 PROCEDURE — 84590 ASSAY OF VITAMIN A: CPT

## 2021-12-01 PROCEDURE — 82746 ASSAY OF FOLIC ACID SERUM: CPT

## 2021-12-01 PROCEDURE — 36415 COLL VENOUS BLD VENIPUNCTURE: CPT

## 2021-12-01 PROCEDURE — 83540 ASSAY OF IRON: CPT

## 2021-12-01 PROCEDURE — 86480 TB TEST CELL IMMUN MEASURE: CPT

## 2021-12-03 LAB
GAMMA INTERFERON BACKGROUND BLD IA-ACNC: 0.03 IU/ML
M TB IFN-G BLD-IMP: NEGATIVE
M TB IFN-G CD4+ BCKGRND COR BLD-ACNC: -0.01 IU/ML
M TB IFN-G CD4+ BCKGRND COR BLD-ACNC: -0.01 IU/ML
MITOGEN IGNF BCKGRD COR BLD-ACNC: >10 IU/ML

## 2021-12-04 LAB — ZINC SERPL-MCNC: 101 UG/DL (ref 44–115)

## 2021-12-06 ENCOUNTER — APPOINTMENT (OUTPATIENT)
Dept: LAB | Facility: HOSPITAL | Age: 36
End: 2021-12-06
Payer: COMMERCIAL

## 2021-12-06 DIAGNOSIS — R79.89 ELEVATED LFTS: ICD-10-CM

## 2021-12-06 LAB
ALT SERPL W P-5'-P-CCNC: 95 U/L (ref 12–78)
AST SERPL W P-5'-P-CCNC: 44 U/L (ref 5–45)
VIT A SERPL-MCNC: 21.9 UG/DL (ref 18.9–57.3)

## 2021-12-06 PROCEDURE — 36415 COLL VENOUS BLD VENIPUNCTURE: CPT | Performed by: FAMILY MEDICINE

## 2021-12-06 PROCEDURE — 84450 TRANSFERASE (AST) (SGOT): CPT

## 2021-12-06 PROCEDURE — 84460 ALANINE AMINO (ALT) (SGPT): CPT | Performed by: FAMILY MEDICINE

## 2021-12-07 ENCOUNTER — ANESTHESIA EVENT (OUTPATIENT)
Dept: PERIOP | Facility: HOSPITAL | Age: 36
End: 2021-12-07
Payer: COMMERCIAL

## 2021-12-07 LAB — VIT B1 BLD-SCNC: 148.9 NMOL/L (ref 66.5–200)

## 2021-12-08 ENCOUNTER — HOSPITAL ENCOUNTER (OUTPATIENT)
Facility: HOSPITAL | Age: 36
Setting detail: OUTPATIENT SURGERY
Discharge: HOME | End: 2021-12-08
Attending: ORTHOPAEDIC SURGERY | Admitting: ORTHOPAEDIC SURGERY
Payer: COMMERCIAL

## 2021-12-08 ENCOUNTER — ANESTHESIA (OUTPATIENT)
Dept: PERIOP | Facility: HOSPITAL | Age: 36
End: 2021-12-08
Payer: COMMERCIAL

## 2021-12-08 ENCOUNTER — APPOINTMENT (OUTPATIENT)
Dept: RADIOLOGY | Facility: HOSPITAL | Age: 36
End: 2021-12-08
Payer: COMMERCIAL

## 2021-12-08 VITALS
RESPIRATION RATE: 22 BRPM | SYSTOLIC BLOOD PRESSURE: 109 MMHG | TEMPERATURE: 97.2 F | WEIGHT: 191.58 LBS | BODY MASS INDEX: 27.43 KG/M2 | HEART RATE: 72 BPM | HEIGHT: 70 IN | DIASTOLIC BLOOD PRESSURE: 60 MMHG | OXYGEN SATURATION: 97 %

## 2021-12-08 DIAGNOSIS — S43.102D AC JOINT DISLOCATION, LEFT, SUBSEQUENT ENCOUNTER: Primary | ICD-10-CM

## 2021-12-08 LAB
EXT PREGNANCY TEST URINE: NEGATIVE
EXT. CONTROL: NORMAL

## 2021-12-08 PROCEDURE — 73030 X-RAY EXAM OF SHOULDER: CPT

## 2021-12-08 PROCEDURE — 81025 URINE PREGNANCY TEST: CPT | Performed by: ORTHOPAEDIC SURGERY

## 2021-12-08 PROCEDURE — 23552 OPTX ACRCLV DSLC AQ/CHRN GRF: CPT | Performed by: PHYSICIAN ASSISTANT

## 2021-12-08 PROCEDURE — C1713 ANCHOR/SCREW BN/BN,TIS/BN: HCPCS | Performed by: ORTHOPAEDIC SURGERY

## 2021-12-08 PROCEDURE — 23552 OPTX ACRCLV DSLC AQ/CHRN GRF: CPT | Performed by: ORTHOPAEDIC SURGERY

## 2021-12-08 PROCEDURE — C1762 CONN TISS, HUMAN(INC FASCIA): HCPCS | Performed by: ORTHOPAEDIC SURGERY

## 2021-12-08 DEVICE — SEMITENDONOSIS ALLOGRAFT 6.5 X 270MM NON-IRRADIATEDIATED: Type: IMPLANTABLE DEVICE | Site: SHOULDER | Status: FUNCTIONAL

## 2021-12-08 DEVICE — LOW-PROFILE AC REPAIR SYSTEM
Type: IMPLANTABLE DEVICE | Site: SHOULDER | Status: FUNCTIONAL
Brand: ARTHREX®

## 2021-12-08 RX ORDER — ONDANSETRON 2 MG/ML
INJECTION INTRAMUSCULAR; INTRAVENOUS AS NEEDED
Status: DISCONTINUED | OUTPATIENT
Start: 2021-12-08 | End: 2021-12-08

## 2021-12-08 RX ORDER — MAGNESIUM HYDROXIDE 1200 MG/15ML
LIQUID ORAL AS NEEDED
Status: DISCONTINUED | OUTPATIENT
Start: 2021-12-08 | End: 2021-12-08 | Stop reason: HOSPADM

## 2021-12-08 RX ORDER — FENTANYL CITRATE 50 UG/ML
INJECTION, SOLUTION INTRAMUSCULAR; INTRAVENOUS AS NEEDED
Status: DISCONTINUED | OUTPATIENT
Start: 2021-12-08 | End: 2021-12-08

## 2021-12-08 RX ORDER — HYDROMORPHONE HCL/PF 1 MG/ML
0.5 SYRINGE (ML) INJECTION
Status: DISCONTINUED | OUTPATIENT
Start: 2021-12-08 | End: 2021-12-08 | Stop reason: HOSPADM

## 2021-12-08 RX ORDER — ONDANSETRON 2 MG/ML
4 INJECTION INTRAMUSCULAR; INTRAVENOUS EVERY 6 HOURS PRN
Status: DISCONTINUED | OUTPATIENT
Start: 2021-12-08 | End: 2021-12-08 | Stop reason: HOSPADM

## 2021-12-08 RX ORDER — PROPOFOL 10 MG/ML
INJECTION, EMULSION INTRAVENOUS AS NEEDED
Status: DISCONTINUED | OUTPATIENT
Start: 2021-12-08 | End: 2021-12-08

## 2021-12-08 RX ORDER — MIDAZOLAM HYDROCHLORIDE 2 MG/2ML
INJECTION, SOLUTION INTRAMUSCULAR; INTRAVENOUS AS NEEDED
Status: DISCONTINUED | OUTPATIENT
Start: 2021-12-08 | End: 2021-12-08

## 2021-12-08 RX ORDER — ACETAMINOPHEN 325 MG/1
975 TABLET ORAL ONCE
Status: COMPLETED | OUTPATIENT
Start: 2021-12-08 | End: 2021-12-08

## 2021-12-08 RX ORDER — ONDANSETRON 2 MG/ML
4 INJECTION INTRAMUSCULAR; INTRAVENOUS ONCE AS NEEDED
Status: DISCONTINUED | OUTPATIENT
Start: 2021-12-08 | End: 2021-12-08 | Stop reason: HOSPADM

## 2021-12-08 RX ORDER — OXYCODONE HYDROCHLORIDE AND ACETAMINOPHEN 5; 325 MG/1; MG/1
1 TABLET ORAL ONCE
Status: DISCONTINUED | OUTPATIENT
Start: 2021-12-08 | End: 2021-12-08 | Stop reason: HOSPADM

## 2021-12-08 RX ORDER — ROPIVACAINE HYDROCHLORIDE 5 MG/ML
INJECTION, SOLUTION EPIDURAL; INFILTRATION; PERINEURAL
Status: COMPLETED | OUTPATIENT
Start: 2021-12-08 | End: 2021-12-08

## 2021-12-08 RX ORDER — ALBUTEROL SULFATE 2.5 MG/3ML
2.5 SOLUTION RESPIRATORY (INHALATION) ONCE AS NEEDED
Status: DISCONTINUED | OUTPATIENT
Start: 2021-12-08 | End: 2021-12-08 | Stop reason: HOSPADM

## 2021-12-08 RX ORDER — OXYCODONE HYDROCHLORIDE AND ACETAMINOPHEN 5; 325 MG/1; MG/1
1 TABLET ORAL EVERY 4 HOURS PRN
Qty: 20 TABLET | Refills: 0 | Status: SHIPPED | OUTPATIENT
Start: 2021-12-08 | End: 2022-01-13

## 2021-12-08 RX ORDER — SODIUM CHLORIDE, SODIUM LACTATE, POTASSIUM CHLORIDE, CALCIUM CHLORIDE 600; 310; 30; 20 MG/100ML; MG/100ML; MG/100ML; MG/100ML
125 INJECTION, SOLUTION INTRAVENOUS CONTINUOUS
Status: DISCONTINUED | OUTPATIENT
Start: 2021-12-08 | End: 2021-12-08 | Stop reason: HOSPADM

## 2021-12-08 RX ORDER — FENTANYL CITRATE/PF 50 MCG/ML
50 SYRINGE (ML) INJECTION
Status: DISCONTINUED | OUTPATIENT
Start: 2021-12-08 | End: 2021-12-08 | Stop reason: HOSPADM

## 2021-12-08 RX ORDER — BUPIVACAINE HYDROCHLORIDE 5 MG/ML
INJECTION, SOLUTION PERINEURAL AS NEEDED
Status: DISCONTINUED | OUTPATIENT
Start: 2021-12-08 | End: 2021-12-08 | Stop reason: HOSPADM

## 2021-12-08 RX ORDER — ONDANSETRON 4 MG/1
4 TABLET, ORALLY DISINTEGRATING ORAL EVERY 6 HOURS PRN
Qty: 20 TABLET | Refills: 0 | Status: SHIPPED | OUTPATIENT
Start: 2021-12-08 | End: 2022-06-09 | Stop reason: SDUPTHER

## 2021-12-08 RX ORDER — CEFAZOLIN SODIUM 2 G/50ML
2000 SOLUTION INTRAVENOUS ONCE
Status: COMPLETED | OUTPATIENT
Start: 2021-12-08 | End: 2021-12-08

## 2021-12-08 RX ORDER — CHLORHEXIDINE GLUCONATE 4 G/100ML
SOLUTION TOPICAL DAILY PRN
Status: DISCONTINUED | OUTPATIENT
Start: 2021-12-08 | End: 2021-12-08 | Stop reason: HOSPADM

## 2021-12-08 RX ORDER — LIDOCAINE HYDROCHLORIDE 10 MG/ML
INJECTION, SOLUTION EPIDURAL; INFILTRATION; INTRACAUDAL; PERINEURAL AS NEEDED
Status: DISCONTINUED | OUTPATIENT
Start: 2021-12-08 | End: 2021-12-08

## 2021-12-08 RX ORDER — DEXAMETHASONE SODIUM PHOSPHATE 4 MG/ML
INJECTION, SOLUTION INTRA-ARTICULAR; INTRALESIONAL; INTRAMUSCULAR; INTRAVENOUS; SOFT TISSUE AS NEEDED
Status: DISCONTINUED | OUTPATIENT
Start: 2021-12-08 | End: 2021-12-08

## 2021-12-08 RX ORDER — ROCURONIUM BROMIDE 10 MG/ML
INJECTION, SOLUTION INTRAVENOUS AS NEEDED
Status: DISCONTINUED | OUTPATIENT
Start: 2021-12-08 | End: 2021-12-08

## 2021-12-08 RX ADMIN — MIDAZOLAM HYDROCHLORIDE 2 MG: 1 INJECTION, SOLUTION INTRAMUSCULAR; INTRAVENOUS at 07:48

## 2021-12-08 RX ADMIN — DEXAMETHASONE SODIUM PHOSPHATE 8 MG: 4 INJECTION, SOLUTION INTRAMUSCULAR; INTRAVENOUS at 08:10

## 2021-12-08 RX ADMIN — FENTANYL CITRATE 50 MCG: 50 INJECTION, SOLUTION INTRAMUSCULAR; INTRAVENOUS at 08:00

## 2021-12-08 RX ADMIN — ACETAMINOPHEN 975 MG: 325 TABLET, FILM COATED ORAL at 06:51

## 2021-12-08 RX ADMIN — SUGAMMADEX 200 MG: 100 INJECTION, SOLUTION INTRAVENOUS at 11:28

## 2021-12-08 RX ADMIN — PHENYLEPHRINE HYDROCHLORIDE 20 MCG/MIN: 10 INJECTION INTRAVENOUS at 08:10

## 2021-12-08 RX ADMIN — FENTANYL CITRATE 50 MCG: 50 INJECTION, SOLUTION INTRAMUSCULAR; INTRAVENOUS at 07:48

## 2021-12-08 RX ADMIN — FENTANYL CITRATE 100 MCG: 50 INJECTION, SOLUTION INTRAMUSCULAR; INTRAVENOUS at 10:00

## 2021-12-08 RX ADMIN — ROCURONIUM BROMIDE 20 MG: 10 INJECTION, SOLUTION INTRAVENOUS at 09:27

## 2021-12-08 RX ADMIN — SODIUM CHLORIDE, SODIUM LACTATE, POTASSIUM CHLORIDE, AND CALCIUM CHLORIDE 125 ML/HR: .6; .31; .03; .02 INJECTION, SOLUTION INTRAVENOUS at 06:52

## 2021-12-08 RX ADMIN — LIDOCAINE HYDROCHLORIDE 30 MG: 10 INJECTION, SOLUTION EPIDURAL; INFILTRATION; INTRACAUDAL; PERINEURAL at 08:01

## 2021-12-08 RX ADMIN — PROPOFOL 200 MG: 10 INJECTION, EMULSION INTRAVENOUS at 08:02

## 2021-12-08 RX ADMIN — CEFAZOLIN SODIUM 2000 MG: 2 SOLUTION INTRAVENOUS at 08:15

## 2021-12-08 RX ADMIN — ROCURONIUM BROMIDE 50 MG: 10 INJECTION, SOLUTION INTRAVENOUS at 08:02

## 2021-12-08 RX ADMIN — PROPOFOL 50 MG: 10 INJECTION, EMULSION INTRAVENOUS at 11:18

## 2021-12-08 RX ADMIN — ROCURONIUM BROMIDE 20 MG: 10 INJECTION, SOLUTION INTRAVENOUS at 08:37

## 2021-12-08 RX ADMIN — SODIUM CHLORIDE, SODIUM LACTATE, POTASSIUM CHLORIDE, AND CALCIUM CHLORIDE: .6; .31; .03; .02 INJECTION, SOLUTION INTRAVENOUS at 11:28

## 2021-12-08 RX ADMIN — ONDANSETRON 4 MG: 2 INJECTION INTRAMUSCULAR; INTRAVENOUS at 11:30

## 2021-12-08 RX ADMIN — ROPIVACAINE HYDROCHLORIDE 15 ML: 5 INJECTION, SOLUTION EPIDURAL; INFILTRATION; PERINEURAL at 07:28

## 2021-12-09 ENCOUNTER — TELEPHONE (OUTPATIENT)
Dept: FAMILY MEDICINE CLINIC | Facility: CLINIC | Age: 36
End: 2021-12-09

## 2021-12-13 ENCOUNTER — TELEMEDICINE (OUTPATIENT)
Dept: PSYCHIATRY | Facility: CLINIC | Age: 36
End: 2021-12-13
Payer: COMMERCIAL

## 2021-12-13 DIAGNOSIS — F33.42 RECURRENT MAJOR DEPRESSIVE DISORDER, IN FULL REMISSION (HCC): ICD-10-CM

## 2021-12-13 DIAGNOSIS — F41.1 GENERALIZED ANXIETY DISORDER: Primary | ICD-10-CM

## 2021-12-13 PROCEDURE — 99214 OFFICE O/P EST MOD 30 MIN: CPT | Performed by: NURSE PRACTITIONER

## 2021-12-13 RX ORDER — PAROXETINE 30 MG/1
60 TABLET, FILM COATED ORAL EVERY MORNING
Qty: 60 TABLET | Refills: 2 | Status: SHIPPED | OUTPATIENT
Start: 2021-12-13 | End: 2022-02-07 | Stop reason: SDUPTHER

## 2021-12-13 RX ORDER — BUSPIRONE HYDROCHLORIDE 10 MG/1
10 TABLET ORAL 2 TIMES DAILY
Qty: 60 TABLET | Refills: 2 | Status: SHIPPED | OUTPATIENT
Start: 2021-12-13 | End: 2022-01-10 | Stop reason: DRUGHIGH

## 2021-12-15 ENCOUNTER — TELEPHONE (OUTPATIENT)
Dept: PLASTIC SURGERY | Facility: CLINIC | Age: 36
End: 2021-12-15

## 2021-12-16 ENCOUNTER — OFFICE VISIT (OUTPATIENT)
Dept: OBGYN CLINIC | Facility: CLINIC | Age: 36
End: 2021-12-16

## 2021-12-16 ENCOUNTER — APPOINTMENT (OUTPATIENT)
Dept: RADIOLOGY | Facility: CLINIC | Age: 36
End: 2021-12-16
Payer: COMMERCIAL

## 2021-12-16 VITALS — BODY MASS INDEX: 27.47 KG/M2 | WEIGHT: 191.9 LBS | HEIGHT: 70 IN

## 2021-12-16 DIAGNOSIS — Z48.89 AFTERCARE FOLLOWING SURGERY: ICD-10-CM

## 2021-12-16 DIAGNOSIS — Z48.89 AFTERCARE FOLLOWING SURGERY: Primary | ICD-10-CM

## 2021-12-16 PROCEDURE — 73030 X-RAY EXAM OF SHOULDER: CPT

## 2021-12-16 PROCEDURE — 99024 POSTOP FOLLOW-UP VISIT: CPT | Performed by: ORTHOPAEDIC SURGERY

## 2021-12-17 ENCOUNTER — OFFICE VISIT (OUTPATIENT)
Dept: PLASTIC SURGERY | Facility: CLINIC | Age: 36
End: 2021-12-17
Payer: COMMERCIAL

## 2021-12-17 DIAGNOSIS — L98.7 EXCESSIVE AND REDUNDANT SKIN AND SUBCUTANEOUS TISSUE: Primary | ICD-10-CM

## 2021-12-17 PROCEDURE — 99215 OFFICE O/P EST HI 40 MIN: CPT | Performed by: SURGERY

## 2021-12-23 ENCOUNTER — EVALUATION (OUTPATIENT)
Dept: PHYSICAL THERAPY | Facility: CLINIC | Age: 36
End: 2021-12-23
Payer: COMMERCIAL

## 2021-12-23 DIAGNOSIS — Z48.89 AFTERCARE FOLLOWING SURGERY: Primary | ICD-10-CM

## 2021-12-23 PROCEDURE — 97161 PT EVAL LOW COMPLEX 20 MIN: CPT

## 2021-12-23 PROCEDURE — 97110 THERAPEUTIC EXERCISES: CPT

## 2021-12-27 ENCOUNTER — APPOINTMENT (OUTPATIENT)
Dept: PHYSICAL THERAPY | Facility: CLINIC | Age: 36
End: 2021-12-27
Payer: COMMERCIAL

## 2021-12-28 ENCOUNTER — OFFICE VISIT (OUTPATIENT)
Dept: PHYSICAL THERAPY | Facility: CLINIC | Age: 36
End: 2021-12-28
Payer: COMMERCIAL

## 2021-12-28 ENCOUNTER — APPOINTMENT (OUTPATIENT)
Dept: PHYSICAL THERAPY | Facility: CLINIC | Age: 36
End: 2021-12-28
Payer: COMMERCIAL

## 2021-12-28 DIAGNOSIS — Z48.89 AFTERCARE FOLLOWING SURGERY: Primary | ICD-10-CM

## 2021-12-28 PROCEDURE — 97110 THERAPEUTIC EXERCISES: CPT

## 2021-12-28 PROCEDURE — 97140 MANUAL THERAPY 1/> REGIONS: CPT

## 2022-01-03 DIAGNOSIS — B00.1 COLD SORE: ICD-10-CM

## 2022-01-04 RX ORDER — ACYCLOVIR 400 MG/1
400 TABLET ORAL 4 TIMES DAILY
Qty: 3020 TABLET | Refills: 0 | Status: SHIPPED | OUTPATIENT
Start: 2022-01-04 | End: 2022-06-15

## 2022-01-06 ENCOUNTER — APPOINTMENT (OUTPATIENT)
Dept: PHYSICAL THERAPY | Facility: CLINIC | Age: 37
End: 2022-01-06
Payer: COMMERCIAL

## 2022-01-07 ENCOUNTER — APPOINTMENT (OUTPATIENT)
Dept: PHYSICAL THERAPY | Facility: CLINIC | Age: 37
End: 2022-01-07
Payer: COMMERCIAL

## 2022-01-10 ENCOUNTER — TELEMEDICINE (OUTPATIENT)
Dept: PSYCHIATRY | Facility: CLINIC | Age: 37
End: 2022-01-10
Payer: COMMERCIAL

## 2022-01-10 DIAGNOSIS — F33.42 RECURRENT MAJOR DEPRESSIVE DISORDER, IN FULL REMISSION (HCC): Primary | ICD-10-CM

## 2022-01-10 DIAGNOSIS — F41.1 GENERALIZED ANXIETY DISORDER: ICD-10-CM

## 2022-01-10 PROCEDURE — 99213 OFFICE O/P EST LOW 20 MIN: CPT | Performed by: NURSE PRACTITIONER

## 2022-01-10 RX ORDER — BUSPIRONE HYDROCHLORIDE 15 MG/1
15 TABLET ORAL 2 TIMES DAILY
Qty: 180 TABLET | Refills: 0 | Status: SHIPPED | OUTPATIENT
Start: 2022-01-10 | End: 2022-02-07 | Stop reason: SDUPTHER
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
## 2022-01-11 ENCOUNTER — OFFICE VISIT (OUTPATIENT)
Dept: PHYSICAL THERAPY | Facility: CLINIC | Age: 37
End: 2022-01-11
Payer: COMMERCIAL

## 2022-01-11 DIAGNOSIS — Z48.89 AFTERCARE FOLLOWING SURGERY: Primary | ICD-10-CM

## 2022-01-11 PROCEDURE — 97140 MANUAL THERAPY 1/> REGIONS: CPT

## 2022-01-11 PROCEDURE — 97110 THERAPEUTIC EXERCISES: CPT

## 2022-01-11 NOTE — PROGRESS NOTES
Daily Note     Today's date: 2022  Patient name: Ruben Tabor  : 1985  MRN: [id number]  Referring provider: Lizbeth Rodriguez PA-C  Dx:   Encounter Diagnosis     ICD-10-CM    1  Aftercare following surgery  Z48 89        Start Time:   Stop Time: 930  Total time in clinic (min): 46 minutes    Subjective: patient reports missing a week because of being stuck in the snow in the [place]  She has been compliant with HEP and reports healing to incision  She has a f/u with Dr Yi Pichardo on Thursday  Objective: See treatment diary below      Assessment: PROM was performed within surgeons current restrictions; increased tightness initially with flexion but was able to get to 90 by end of PROM  Tactile/verbal cues to avoid compensation from UT and educated to be aware of posture t/o day to start retraining scap  Denied any increases in pain t/o visit  Plan: Continue with current POC to address pt deficits        Precautions: PER SURGEON = PROM to 90 deg flexion, 90 deg abd, 50 deg ER MAX, no crossarm adduction      DATES:           Manuals             PROM  ES; flex, abd, ER within restrictions TB; flex, abd, ER within restictions                                                 Neuro Re-Ed                                                                                                        Ther Ex             Pt edu Pt pres, anatomy, ex tech, HEP, POC, review of ROM and post op restrictions Return to activity timeline, HEP update and review           Pendulums 20x CW, 20x CCW 20x CW, 20x CCW 20x CW, 20x CWW          Table slide - flexion 15x PROM 20x PROM 20x PROM          Table slides - abd  20x PROM 20x PROM          Elbow AROM 10x flex, 10x ext 20x flex 20x ext 20x flex, 20x ext           10x 5s towel 3x10 green full  30x green full           Supine ER PROM  2x10 with stick 2x10 w/stick           Scap squeeze  2x10 5s 2x10 5s          Digigrip - each digit 3x10 green 20x green           Wrist AROM   Flex, ext, UD, RD x20 ea                                    Ther Activity                                       Gait Training                                       Modalities                          HEP: Emily Zuniga

## 2022-01-13 ENCOUNTER — OFFICE VISIT (OUTPATIENT)
Dept: OBGYN CLINIC | Facility: CLINIC | Age: 37
End: 2022-01-13

## 2022-01-13 ENCOUNTER — APPOINTMENT (OUTPATIENT)
Dept: PHYSICAL THERAPY | Facility: CLINIC | Age: 37
End: 2022-01-13
Payer: COMMERCIAL

## 2022-01-13 ENCOUNTER — APPOINTMENT (OUTPATIENT)
Dept: RADIOLOGY | Facility: CLINIC | Age: 37
End: 2022-01-13
Payer: COMMERCIAL

## 2022-01-13 VITALS
DIASTOLIC BLOOD PRESSURE: 73 MMHG | SYSTOLIC BLOOD PRESSURE: 113 MMHG | HEART RATE: 75 BPM | HEIGHT: 70 IN | WEIGHT: 191.4 LBS | BODY MASS INDEX: 27.4 KG/M2

## 2022-01-13 DIAGNOSIS — Z48.89 AFTERCARE FOLLOWING SURGERY: ICD-10-CM

## 2022-01-13 DIAGNOSIS — Z48.89 AFTERCARE FOLLOWING SURGERY: Primary | ICD-10-CM

## 2022-01-13 PROCEDURE — 99024 POSTOP FOLLOW-UP VISIT: CPT | Performed by: ORTHOPAEDIC SURGERY

## 2022-01-13 PROCEDURE — 73030 X-RAY EXAM OF SHOULDER: CPT

## 2022-01-13 NOTE — PROGRESS NOTES
Patient Name:  Fernanda Godoy  MRN:  [id number]    Assessment & Plan     1  Aftercare following surgery  -     XR shoulder 2+ vw left; Future; Expected date: 01/13/2022        39 y o  female aprox  5 weeks s/p left AC joint reconstruction, DOS 12/08/21  Overall Gerald Beckie is doing well  She will continue the use of the post op sling for 1 more week  In 1 weeks time she will progress with PT  6-7 weeks: Discontinue sling   Passive ROM, active assist ROM, active ROM of shoulder to 160 degrees forward flexion, 120 abduction, internal and external rotation neutral to maximum on 70 degrees  Begin light resistance training through mid range internal and external rotation without elevation  Begin periscapular strengthening and stabilization  Weeks 8 through 12: Achieve near full ROM, Increase scapular training  Increase resistance exercises, no overhead lifting  Beyond 12 weeks: Continue to work on achieving full range of motion   Begin to increase strengthening including overhead activities   At 16 weeks begin overhead strengthening and progressive strengthening for work related activities  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, no lifting, pushing, pulling or overhead work with regards to the LUE  Out of work at this time  Notes were provided  Follow up in 6 weeks time with repeat left shoulder x-rays  History of the Present Illness   Fernanda Godoy is a 39 y o  female aprox  5 weeks s/p left AC joint reconstruction, DOS 12/08/21  Overall Gerald Butts is doing well  She has been to PT aprox  4-5 times this far and performing a HEP  She starts nursing school next week, 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  She denies any pain today  She has been compliant with the use of the sling  Review of Systems     Review of Systems   Constitutional: Negative for chills, fever and unexpected weight change  HENT: Negative for hearing loss, nosebleeds and sore throat      Eyes: Negative for pain, redness and visual disturbance  Respiratory: Negative for cough, shortness of breath and wheezing  Cardiovascular: Negative for chest pain, palpitations and leg swelling  Gastrointestinal: Negative for abdominal pain, nausea and vomiting  Endocrine: Negative for polydipsia and polyuria  Genitourinary: Negative for difficulty urinating and hematuria  Musculoskeletal: Negative for arthralgias, joint swelling and myalgias  Skin: Negative for rash and wound  Neurological: Negative for dizziness, numbness and headaches  Psychiatric/Behavioral: Negative for decreased concentration, dysphoric mood and suicidal ideas  The patient is not nervous/anxious  Physical Exam     /73   Pulse 75   Ht 5' 10" (1 778 m)   Wt 86 8 kg (191 lb 6 4 oz)   BMI 27 46 kg/m²     Left Shoulder:   Passive range of motion   90 degrees of forward flexion   Full elbow ROM   Full wrist and digital ROM  There is no tenderness or bump       Data Review     I have personally reviewed pertinent films in PACS, and my interpretation follows  X-ray left shoulder obtained in the office today demonstrates appropriate placement of orthopedic hardware button  Reduction of AC joint appreciated  Social History     Tobacco Use    Smoking status: Former Smoker     Packs/day: 0 00     Years: 0 00     Pack years: 0 00     Types: Cigarettes     Quit date:      Years since quittin 0    Smokeless tobacco: Former User    Tobacco comment: 0 75 ppd for 2-3 years; History of vaping    Vaping Use    Vaping Use: Former    Substances: Nicotine   Substance Use Topics    Alcohol use:  Yes     Alcohol/week: 1 0 standard drink     Types: 1 Standard drinks or equivalent per week     Comment: occasional    Drug use: No       Procedures      Scribe Attestation    I,:  Tess Alaniz am acting as a scribe while in the presence of the attending physician :       I,:  Cary Bernabe DO personally performed the services described in this documentation    as scribed in my presence :

## 2022-01-13 NOTE — LETTER
January 13, 2022     Patient: Vin Bourne   YOB: 1985   Date of Visit: 1/13/2022       To Whom it May Concern:    Vin Bourne is under my professional care  She was seen in my office on 1/13/2022  She is out of work at this time  She will be re-evaluated in 6 weeks time  If you have any questions or concerns, please don't hesitate to call           Sincerely,          Crystal Mercer, DO

## 2022-01-13 NOTE — LETTER
January 13, 2022     Patient: Annika Avila   YOB: 1985   Date of Visit: 1/13/2022       To Whom it May Concern:    Annika Avila is under my professional care  She was seen in my office on 1/13/2022  No lifting, pushing, pulling or overhead work with the left upper extremity  If you have any questions or concerns, please don't hesitate to call           Sincerely,          Cary Bernabe, DO

## 2022-01-14 ENCOUNTER — TELEPHONE (OUTPATIENT)
Dept: OBGYN CLINIC | Facility: HOSPITAL | Age: 37
End: 2022-01-14

## 2022-01-14 NOTE — TELEPHONE ENCOUNTER
Patient sees Davey Mcgarry called to confirm pts type of surgery on 12/08/21    [employer] would also like to have the office note from her last visit 01/13/22 & work note also      Fax: [phone]  Phone: [phone]    I was able to fax over all of the information

## 2022-01-18 ENCOUNTER — APPOINTMENT (OUTPATIENT)
Dept: PHYSICAL THERAPY | Facility: CLINIC | Age: 37
End: 2022-01-18
Payer: COMMERCIAL

## 2022-01-20 ENCOUNTER — APPOINTMENT (OUTPATIENT)
Dept: PHYSICAL THERAPY | Facility: CLINIC | Age: 37
End: 2022-01-20
Payer: COMMERCIAL

## 2022-01-20 PROBLEM — E86.0 DEHYDRATION: Status: RESOLVED | Noted: 2020-01-29 | Resolved: 2022-01-20

## 2022-01-20 PROBLEM — M76.31 IT BAND SYNDROME, RIGHT: Status: RESOLVED | Noted: 2019-11-07 | Resolved: 2022-01-20

## 2022-01-28 ENCOUNTER — EVALUATION (OUTPATIENT)
Dept: PHYSICAL THERAPY | Facility: CLINIC | Age: 37
End: 2022-01-28
Payer: COMMERCIAL

## 2022-01-28 DIAGNOSIS — Z48.89 AFTERCARE FOLLOWING SURGERY: ICD-10-CM

## 2022-01-28 PROCEDURE — 97140 MANUAL THERAPY 1/> REGIONS: CPT

## 2022-01-28 PROCEDURE — 97110 THERAPEUTIC EXERCISES: CPT

## 2022-01-28 NOTE — PROGRESS NOTES
PT Re-Evaluation  Collection of subjective/objective data performed by Anne Fowler PTA; Assessment, POC, and Goal attainment performed by Shona Fothergill, DPT      Today's date: 2022  Patient name: Vin Bourne  : 1985  MRN: [id number]  Referring provider: Oneil Humphrey PA-C  Dx:   Encounter Diagnosis     ICD-10-CM    1  Aftercare following surgery  Z48 89 Ambulatory referral to Physical Therapy                  Assessment  Assessment details: Patient is a 39 y o  female s/p left AC joint reconstruction, DOS 21 and has completed 4 visits to date with improved FOTO score of 42 to 49 since initial evaluation  Patient demonstrates notable subjective/objective improvement since enrolling in PT to include decreased pain and improved ROM, however continues to exhibit lingering deficits in ROM and strength that continues to impact tolerance/ability to perform ADLs, work duties, and recreational activities  Patient will benefit from continued skilled PT intervention to address the aforementioned impairments, achieve goals, maximize function, and improve quality of life  Pt is in agreement with this plan          Impairments: abnormal or restricted ROM, activity intolerance, impaired physical strength, lacks appropriate home exercise program and pain with function    Goals  STG: to be achieved by 4 weeks  Pt will demonstrate independence with provided HEP-MET  Pt will decrease pain to no greater than 1/10-MET  Pt will improve L shoulder flexion ROM to approx 90 deg-MET  Pt will improve L shoulder abd ROM to approx 90 deg-MET    LTG: to be achieved by discharge  Pt will improve FOTO score to at least 72-progressing  Pt will be able to perform at least 30lbs floor to waist lift-progressing  Pt will report no pain or difficulty with all ADLs-progressing  Pt will report no pain or difficulty with [place] ADLs-progressing  Pt will report no pain or difficulty with lifting at least 5lbs to eye level with the L UE-progessing      Plan  Patient would benefit from: skilled physical therapy  Planned modality interventions: cryotherapy, TENS and thermotherapy: hydrocollator packs (will assess pt appropriateness for TENS usage to ensure no contraindications')  Planned therapy interventions: home exercise program, functional ROM exercises, flexibility, joint mobilization, manual therapy, Moncada taping, neuromuscular re-education, patient education, postural training, strengthening, stretching, therapeutic activities and therapeutic exercise  Frequency: 2x week  Duration in weeks: 6  Plan of Care beginning date: 2022  Plan of Care expiration date: 3/11/2022  Treatment plan discussed with: patient        Subjective Evaluation    History of Present Illness  Mechanism of injury: Patient reports 50% improvement in L shoulder pain and function since beginning physical therapy 22  DOS: 22  She reports sensitivity to incision and reports numbness tingling intermittently into lateral L shoulder  She denies any pain, only stiffness and has been using her L UE more often at home and notes improvement in stiffness with motion and HP  FOTO score improved from a 42-49       Aggs/difficulties: sleeping, dressing, reaching, lifting, weight bearing  Eases/previous treatment: tylenol - hasn't needed, motion, heat     Goals: get to back to normal, increase strength, back to using arm like usual    Pain  Current pain ratin  At best pain ratin  At worst pain ratin          Objective     Observations     Additional Observation Details  Incision healed, no dressing, no erythema     Active Range of Motion   Left Shoulder   Flexion: 137 degrees   Abduction: 105 degrees   External rotation 0°: 70 degrees   Internal rotation 0°: 60 degrees     Right Shoulder   Flexion: 153 degrees   Abduction: 180 degrees   External rotation 0°: 82 degrees   Internal rotation BTB: T5     Additional Active Range of Motion Details  B/l elbow flexion and ext AROM: WNL     Passive Range of Motion   Left Shoulder   Flexion: 140 degrees   Abduction: 112 degrees   External rotation 0°: 70 (at 15 deg abd) degrees   Internal rotation 0°: 60 degrees     Strength/Myotome Testing     Right Shoulder     Planes of Motion   Flexion: 5   Abduction: 5   External rotation at 0°: 5   Internal rotation at 0°: 5     Additional Strength Details   STRENGTH: R = 76lbs, L = 75lbs             Precautions: PER SURGEON = 6-7 weeks: Discontinue sling   Passive ROM, active assist ROM, active ROM of shoulder to 160 degrees forward flexion, 120 abduction, internal and external rotation neutral to maximum on 70 degrees  Begin light resistance training through mid range internal and external rotation without elevation  Begin periscapular strengthening and stabilization  Weeks 8 through 12: Achieve near full ROM, Increase scapular training  Increase resistance exercises, no overhead lifting  Beyond 12 weeks: Continue to work on achieving full range of motion   Begin to increase strengthening including overhead activities   At 16 weeks begin overhead strengthening and progressive strengthening for work related activities            POC expires 3/11/22            DATES: 12/23 12/28 1/11 1/28         Manuals             PROM  ES; flex, abd, ER within restrictions TB; flex, abd, ER within restictions TB: within restrictions         Gentle scar massage    TB         Scar Descensitization    TB                      Neuro Re-Ed             Prone scap squeezes             Ball on wall                                                                               Ther Ex             Pt edu Pt pres, anatomy, ex tech, HEP, POC, review of ROM and post op restrictions Return to activity timeline, HEP update and review  updated HEP, FOTO, discussing of current ROM restrictions/posture, objective measures         Pendulums 20x CW, 20x CCW 20x CW, 20x CCW 20x CW, 20x CWW Table slide - flexion 15x PROM 20x PROM 20x PROM          Table slides - abd  20x PROM 20x PROM          Elbow AROM 10x flex, 10x ext 20x flex 20x ext 20x flex, 20x ext           10x 5s towel 3x10 green full  30x green full           Supine ER PROM  2x10 with stick 2x10 w/stick           Scap squeeze  2x10 5s 2x10 5s          Digigrip - each digit   3x10 green 20x green           Wrist AROM   Flex, ext, UD, RD x20 ea          Wall slides              Rows/extensions             AAROM abduction     standing 5" x10         ER/IR iso     5" x10 ea          Pulleys     Flex/scap 2 min each         AAROM flexion    w/opp UE 10"x10 within restrictions         Ther Activity                                       Gait Training                                       Modalities                          HEP: Dannette Schaumann

## 2022-02-01 ENCOUNTER — OFFICE VISIT (OUTPATIENT)
Dept: PHYSICAL THERAPY | Facility: CLINIC | Age: 37
End: 2022-02-01
Payer: COMMERCIAL

## 2022-02-01 ENCOUNTER — TELEPHONE (OUTPATIENT)
Dept: BARIATRICS | Facility: CLINIC | Age: 37
End: 2022-02-01

## 2022-02-01 DIAGNOSIS — Z48.89 AFTERCARE FOLLOWING SURGERY: Primary | ICD-10-CM

## 2022-02-01 PROCEDURE — 97140 MANUAL THERAPY 1/> REGIONS: CPT

## 2022-02-01 PROCEDURE — 97110 THERAPEUTIC EXERCISES: CPT

## 2022-02-01 NOTE — PROGRESS NOTES
Daily Note     Today's date: 2022  Patient name: Judy Kovacs  : 1985  MRN: [id number]  Referring provider: Jeri Henson PA-C  Dx:   Encounter Diagnosis     ICD-10-CM    1  Aftercare following surgery  Z48 89        Start Time: 1226  Stop Time: 1310  Total time in clinic (min): 44 minutes    Subjective: patient reports no increases in pain following last visit in L shoulder and reports compliance with updated HEP  Objective: See treatment diary below      Assessment: Added periscap exercises as well as scap stab per protocol with no increases in pain; visual and verbal cueing to ensure correct exercise technique and avoid UT compensation  Mild limitation in flexion in regards to current restrictions but is improving from previous visit  Denied any increases in pain with new strengthening/AAROM/AROM exercises  Progress as able per protocol  Plan: Continue with current POC to address pt deficits  Precautions: PER SURGEON = 6-7 weeks: Discontinue sling   Passive ROM, active assist ROM, active ROM of shoulder to 160 degrees forward flexion, 120 abduction, internal and external rotation neutral to maximum on 70 degrees  Begin light resistance training through mid range internal and external rotation without elevation  Begin periscapular strengthening and stabilization  Weeks 8 through 12: Achieve near full ROM, Increase scapular training  Increase resistance exercises, no overhead lifting  Beyond 12 weeks: Continue to work on achieving full range of motion   Begin to increase strengthening including overhead activities   At 16 weeks begin overhead strengthening and progressive strengthening for work related activities      POC expires 3/11/22            DATES:         Manuals             PROM  ES; flex, abd, ER within restrictions TB; flex, abd, ER within restictions TB: within restrictions TB: within restrictions        Gentle scar massage    TB STM UT Scar Descensitization    TB                      Neuro Re-Ed             Prone scap squeezes     10"x10        Ball on wall      20x CW, 20x CCW                                                                         Ther Ex             Pt edu Pt pres, anatomy, ex tech, HEP, POC, review of ROM and post op restrictions Return to activity timeline, HEP update and review  updated HEP, FOTO, discussing of current ROM restrictions/posture, objective measures         Pendulums 20x CW, 20x CCW 20x CW, 20x CCW 20x CW, 20x CWW          Table slide - flexion 15x PROM 20x PROM 20x PROM          Table slides - abd  20x PROM 20x PROM          Elbow AROM 10x flex, 10x ext 20x flex 20x ext 20x flex, 20x ext           10x 5s towel 3x10 green full  30x green full           Supine ER PROM  2x10 with stick 2x10 w/stick           Scap squeeze  2x10 5s 2x10 5s          Digigrip - each digit   3x10 green 20x green           Wrist AROM   Flex, ext, UD, RD x20 ea          Wall slides      Flex 3"x10        Rows/extensions     ytb rows to neutral x20        AAROM abduction     standing 5" x10 standing 5" x10        ER/IR iso     5" x10 ea  5" x10 ea        Pulleys     Flex/scap 2 min each Flex 3'/ scap 3'        AROM abduction      sidelying x15 w/post tilt        AROM ER     sidelying with post tile  x15        AROM flexion      Supine w/post tilt x15        AAROM flexion    w/opp UE 10"x10 within restrictions I/Y 5" x10 within restrictions        Ther Activity                                       Gait Training                                       Modalities                          HEP: [id number]

## 2022-02-04 ENCOUNTER — APPOINTMENT (OUTPATIENT)
Dept: PHYSICAL THERAPY | Facility: CLINIC | Age: 37
End: 2022-02-04
Payer: COMMERCIAL

## 2022-02-07 ENCOUNTER — TELEMEDICINE (OUTPATIENT)
Dept: PSYCHIATRY | Facility: CLINIC | Age: 37
End: 2022-02-07
Payer: COMMERCIAL

## 2022-02-07 DIAGNOSIS — F41.1 GENERALIZED ANXIETY DISORDER: ICD-10-CM

## 2022-02-07 DIAGNOSIS — G47.00 INSOMNIA, UNSPECIFIED TYPE: ICD-10-CM

## 2022-02-07 PROCEDURE — 99213 OFFICE O/P EST LOW 20 MIN: CPT | Performed by: NURSE PRACTITIONER

## 2022-02-07 RX ORDER — BUSPIRONE HYDROCHLORIDE 15 MG/1
15 TABLET ORAL 2 TIMES DAILY
Qty: 180 TABLET | Refills: 0 | Status: SHIPPED | OUTPATIENT
Start: 2022-02-07 | End: 2022-04-11 | Stop reason: SDUPTHER

## 2022-02-07 RX ORDER — PAROXETINE 30 MG/1
60 TABLET, FILM COATED ORAL EVERY MORNING
Qty: 180 TABLET | Refills: 0 | Status: SHIPPED | OUTPATIENT
Start: 2022-02-07 | End: 2022-04-11 | Stop reason: SDUPTHER

## 2022-02-07 RX ORDER — TRAZODONE HYDROCHLORIDE 100 MG/1
100 TABLET ORAL
Qty: 90 TABLET | Refills: 0 | Status: SHIPPED | OUTPATIENT
Start: 2022-02-07 | End: 2022-04-25 | Stop reason: SDUPTHER
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
## 2022-02-08 ENCOUNTER — OFFICE VISIT (OUTPATIENT)
Dept: PHYSICAL THERAPY | Facility: CLINIC | Age: 37
End: 2022-02-08
Payer: COMMERCIAL

## 2022-02-08 DIAGNOSIS — Z48.89 AFTERCARE FOLLOWING SURGERY: Primary | ICD-10-CM

## 2022-02-08 PROCEDURE — 97140 MANUAL THERAPY 1/> REGIONS: CPT | Performed by: PHYSICAL THERAPIST

## 2022-02-08 PROCEDURE — 97110 THERAPEUTIC EXERCISES: CPT | Performed by: PHYSICAL THERAPIST

## 2022-02-08 NOTE — PROGRESS NOTES
Daily Note     Today's date: 2022  Patient name: Mattie Alvarado  : 1985  MRN: [id number]  Referring provider: Celso Lui PA-C  Dx:   Encounter Diagnosis     ICD-10-CM    1  Aftercare following surgery  [id number]        Start Time: 1100  Stop Time: 1150  Total time in clinic (min): 50 minutes    Subjective: Pt denies any pain during or after last visit, just soreness/tightness  Pt notes mild stiffness today but denies any current pain  Objective: See treatment diary below      Assessment: Pt is now just over 8 weeks out from surgery and able to progress ROM to tolerance with good improvement observed passively all planes with only slight end range limitations noted without reports of pain, only tightness  Introduced light rhythmic stabilization with focus on maintaining 90* flexion with perturbations in various planes with slight delayed reactionary muscular contractions but improves with repetition and able to complete without provoking pain  Pt able to complete mat table AROM with good control and no pain and able to introduce light weight of 1# for all directions into supine flexion and sidelying ER/abduction with minimal challenge noted  Pt encouraged to continue with her ROM exercises daily as symptoms allow and will continue to progress as tolerated per protocol  Plan: Continue per plan of care  Progress treatment as tolerated  Precautions: PER SURGEON = 6-7 weeks: Discontinue sling   Passive ROM, active assist ROM, active ROM of shoulder to 160 degrees forward flexion, 120 abduction, internal and external rotation neutral to maximum on 70 degrees  Begin light resistance training through mid range internal and external rotation without elevation  Begin periscapular strengthening and stabilization  Weeks 8 through 12: Achieve near full ROM, Increase scapular training  Increase resistance exercises, no overhead lifting   Beyond 12 weeks: Continue to work on achieving full range of motion   Begin to increase strengthening including overhead activities   At 16 weeks begin overhead strengthening and progressive strengthening for work related activities      POC expires 3/11/22            DATES: 12/23 12/28 1/11 1/28 2/1 2/8       Manuals             PROM  ES; flex, abd, ER within restrictions TB; flex, abd, ER within restictions TB: within restrictions TB: within restrictions KD       Gentle scar massage    TB STM UT         Rhythmic stab      KD at 90* flex       Neuro Re-Ed             Prone scap squeezes     10"x10 10x10"       Prone scap retraction/depression      10x10"       Ball on wall      20x CW, 20x CCW 20x CW, 20x CCW                                                                        Ther Ex      2/8       Pt edu Pt pres, anatomy, ex tech, HEP, POC, review of ROM and post op restrictions Return to activity timeline, HEP update and review  updated HEP, FOTO, discussing of current ROM restrictions/posture, objective measures         Scap squeeze  2x10 5s 2x10 5s          Wall slides      Flex 3"x10 Flex/scaption 10x3" ea       Rows/extensions     ytb rows to neutral x20 grn rows to neutral x20       AAROM abduction     standing 5" x10 standing 5" x10        ER/IR iso     5" x10 ea  5" x10 ea 5"x10 ea       Pulleys     Flex/scap 2 min each Flex 3'/ scap 3'        AROM abduction      sidelying x15 w/post tilt SL 2x10 1#       AROM ER     sidelying with post tile  x15 SL 2x10 1#       AROM flexion      Supine w/post tilt x15 Supine 2x10 1#       AAROM flexion    w/opp UE 10"x10 within restrictions I/Y 5" x10   I/Y 5" x10 1#       Ther Activity                                       Gait Training                                       Modalities                          HEP: [id number]

## 2022-02-11 ENCOUNTER — OFFICE VISIT (OUTPATIENT)
Dept: PHYSICAL THERAPY | Facility: CLINIC | Age: 37
End: 2022-02-11
Payer: COMMERCIAL

## 2022-02-11 DIAGNOSIS — Z48.89 AFTERCARE FOLLOWING SURGERY: Primary | ICD-10-CM

## 2022-02-11 PROCEDURE — 97110 THERAPEUTIC EXERCISES: CPT

## 2022-02-11 PROCEDURE — 97140 MANUAL THERAPY 1/> REGIONS: CPT

## 2022-02-11 NOTE — PROGRESS NOTES
Daily Note     Today's date: 2022  Patient name: Elliott Connolly  : 1985  MRN: [id number]  Referring provider: Padma Greer PA-C  Dx:   Encounter Diagnosis     ICD-10-CM    1  Aftercare following surgery  Z48 89                   Subjective: patient reports less discomfort in L AC joint region and reports improvement each day  She reports compliance with HEP  Objective: See treatment diary below      Assessment: Added pillow underneath hips during prone lying to decrease discomfort to lumbar spine  Increased ROM superficially with abduction/flexion with AAROM exercises  Visual and verbal cueing to ensure correct exercise technique and ensure scapular depression and avoid compensation from b/l UT  Progressed ER/IR iso to walkout iso with more fatigue noted but no pain  Plan: Continue with current POC to address pt deficits  Precautions: PER SURGEON = 6-7 weeks: Discontinue sling   Passive ROM, active assist ROM, active ROM of shoulder to 160 degrees forward flexion, 120 abduction, internal and external rotation neutral to maximum on 70 degrees  Begin light resistance training through mid range internal and external rotation without elevation  Begin periscapular strengthening and stabilization  Weeks 8 through 12: Achieve near full ROM, Increase scapular training  Increase resistance exercises, no overhead lifting  Beyond 12 weeks: Continue to work on achieving full range of motion   Begin to increase strengthening including overhead activities   At 16 weeks begin overhead strengthening and progressive strengthening for work related activities      POC expires 3/11/22            DATES:       Manuals             PROM  ES; flex, abd, ER within restrictions TB; flex, abd, ER within restictions TB: within restrictions TB: within restrictions KD TB      Gentle scar massage    TB STM UT         Rhythmic stab      KD at 90* flex TB at 80* flex Neuro Re-Ed             Prone scap squeezes     10"x10 10x10" 10x10"      Prone scap retraction/depression      10x10" 10x10"      Ball on wall      20x CW, 20x CCW 20x CW, 20x CCW 20x CW ,20x CCW                                                                       Ther Ex      2/8 2/11      Pt edu Pt pres, anatomy, ex tech, HEP, POC, review of ROM and post op restrictions Return to activity timeline, HEP update and review  updated HEP, FOTO, discussing of current ROM restrictions/posture, objective measures         Scap squeeze  2x10 5s 2x10 5s          Wall slides      Flex 3"x10 Flex/scaption 10x3" ea Flex/scap 10x 3" ea       Rows/extensions     ytb rows to neutral x20 grn rows to neutral x20 grn rows to neutral x20      AAROM abduction     standing 5" x10 standing 5" x10        ER/IR iso     5" x10 ea  5" x10 ea 5"x10 ea x10 laps ytb ER/IR      Pulleys     Flex/scap 2 min each Flex 3'/ scap 3'  Flex 3'/scap 3'      AROM abduction      sidelying x15 w/post tilt SL 2x10 1# SL 2x10 1#       AROM ER     sidelying with post tile  x15 SL 2x10 1# SL 2x10 1#       AROM flexion      Supine w/post tilt x15 Supine 2x10 1# Supine 2x10 1#       AAROM flexion    w/opp UE 10"x10 within restrictions I/Y 5" x10   I/Y 5" x10 1# I/Y 5" x10 1#       Ther Activity                                       Gait Training                                       Modalities                          HEP: Bola Loyola

## 2022-02-15 ENCOUNTER — OFFICE VISIT (OUTPATIENT)
Dept: PHYSICAL THERAPY | Facility: CLINIC | Age: 37
End: 2022-02-15
Payer: COMMERCIAL

## 2022-02-15 DIAGNOSIS — Z48.89 AFTERCARE FOLLOWING SURGERY: Primary | ICD-10-CM

## 2022-02-15 PROCEDURE — 97140 MANUAL THERAPY 1/> REGIONS: CPT

## 2022-02-15 PROCEDURE — 97110 THERAPEUTIC EXERCISES: CPT

## 2022-02-15 NOTE — PROGRESS NOTES
Daily Note     Today's date: 2/15/2022  Patient name: Frances Snell  : 1985  MRN: [id number]  Referring provider: Moni Schmid PA-C  Dx:   Encounter Diagnosis     ICD-10-CM    1  Aftercare following surgery  Z48 89                   Subjective: patient reports warmth to incision but is unsure; denies any fever or other signs of infection  She feels that there is a bump on shoulder but reports it is better asthetically  She denies any pain in L shoulder/AC joint and reports compliant with HEP and avoiding lifting anything OH  Objective: See treatment diary below      Assessment: Patient able to increase DB resistance with ER and abduction with eccentric control and no increases in pain; able to maintain scap stab  Fatigue with ball on wall  ROM is near full at this point  Progress as able per protocol  Plan: Continue with current POC to address pt deficits  Precautions: PER SURGEON = 6-7 weeks: Discontinue sling   Passive ROM, active assist ROM, active ROM of shoulder to 160 degrees forward flexion, 120 abduction, internal and external rotation neutral to maximum on 70 degrees  Begin light resistance training through mid range internal and external rotation without elevation  Begin periscapular strengthening and stabilization  Weeks 8 through 12: Achieve near full ROM, Increase scapular training  Increase resistance exercises, no overhead lifting  Beyond 12 weeks: Continue to work on achieving full range of motion   Begin to increase strengthening including overhead activities   At 16 weeks begin overhead strengthening and progressive strengthening for work related activities      POC expires 3/11/22            DATES: 12/23 12/28 1/11 1/28 2/1 2/8 2/11 2/15     Manuals             PROM  ES; flex, abd, ER within restrictions TB; flex, abd, ER within restictions TB: within restrictions TB: within restrictions KD TB TB     Gentle scar massage    TB STM UT         Rhythmic stab KD at 90* flex TB at 80* flex  TB at 80* flex and 45* bent elbow 10"x3 ea EC     Neuro Re-Ed             Prone scap squeezes     10"x10 10x10" 10x10" 10x10"     Prone scap retraction/depression      10x10" 10x10" 10x10"     Ball on wall      20x CW, 20x CCW 20x CW, 20x CCW 20x CW ,20x CCW 2# 20x CW, 20x CCW                                                                      Ther Ex      2/8 2/11 2/15     Pt edu Pt pres, anatomy, ex tech, HEP, POC, review of ROM and post op restrictions Return to activity timeline, HEP update and review  updated HEP, FOTO, discussing of current ROM restrictions/posture, objective measures         Scap squeeze  2x10 5s 2x10 5s          Wall slides      Flex 3"x10 Flex/scaption 10x3" ea Flex/scap 10x 3" ea  Flex/scap x10 3" ea      Rows/extensions     ytb rows to neutral x20 grn rows to neutral x20 grn rows to neutral x20 grn rows to neutral, grn ext to neutral      AAROM abduction     standing 5" x10 standing 5" x10   standing 5"x10     ER/IR iso     5" x10 ea  5" x10 ea 5"x10 ea x10 laps ytb ER/IR x10 laps ytb ER/IR     Pulleys     Flex/scap 2 min each Flex 3'/ scap 3'  Flex 3'/scap 3' Flex 3'/scap 3'      AROM abduction      sidelying x15 w/post tilt SL 2x10 1# SL 2x10 1#  SL 2x10 2#     AROM ER     sidelying with post tile  x15 SL 2x10 1# SL 2x10 1#  SL 2x10 2#      AROM flexion      Supine w/post tilt x15 Supine 2x10 1# Supine 2x10 1#  supine 2x10 1#      AAROM flexion    w/opp UE 10"x10 within restrictions I/Y 5" x10   I/Y 5" x10 1# I/Y 5" x10 1#  I/Y 5" x10 1#      Ther Activity                                       Gait Training                                       Modalities                          HEP: Karle Klinefelter

## 2022-02-18 ENCOUNTER — APPOINTMENT (OUTPATIENT)
Dept: PHYSICAL THERAPY | Facility: CLINIC | Age: 37
End: 2022-02-18
Payer: COMMERCIAL

## 2022-02-18 NOTE — PROGRESS NOTES
Daily Note     Today's date: 2022  Patient name: Leonard Clarke YOB: 1985  MRN: [id number]  Referring provider: Tyler Waite PA-C  Dx: No diagnosis found  Subjective: ***      Objective: See treatment diary below      Assessment: Tolerated treatment {Tolerated treatment :5067816770}  Patient {assessment:0927100865}      Plan: {PLAN:7392382386}     Precautions: PER SURGEON = 6-7 weeks: Discontinue sling   Passive ROM, active assist ROM, active ROM of shoulder to 160 degrees forward flexion, 120 abduction, internal and external rotation neutral to maximum on 70 degrees  Begin light resistance training through mid range internal and external rotation without elevation  Begin periscapular strengthening and stabilization  Weeks 8 through 12: Achieve near full ROM, Increase scapular training  Increase resistance exercises, no overhead lifting  Beyond 12 weeks: Continue to work on achieving full range of motion   Begin to increase strengthening including overhead activities   At 16 weeks begin overhead strengthening and progressive strengthening for work related activities      POC expires 3/11/22            DATES: 12/23 12/28 1/11 1/28 2/1 2/8 2/11 2/15 2/18    Manuals             PROM  ES; flex, abd, ER within restrictions TB; flex, abd, ER within restictions TB: within restrictions TB: within restrictions KD TB TB     Gentle scar massage    TB STM UT         Rhythmic stab      KD at 80* flex TB at 80* flex  TB at 80* flex and 45* bent elbow 10"x3 ea EC     Neuro Re-Ed             Prone scap squeezes     10"x10 10x10" 10x10" 10x10"     Prone scap retraction/depression      10x10" 10x10" 10x10"     Ball on wall      20x CW, 20x CCW 20x CW, 20x CCW 20x CW ,20x CCW 2# 20x CW, 20x CCW     Body Blade                                                                  Ther Ex      2/8 2/11 2/15     Pt edu Pt pres, anatomy, ex tech, HEP, POC, review of ROM and post op restrictions Return to activity timeline, HEP update and review  updated HEP, FOTO, discussing of current ROM restrictions/posture, objective measures         Scap squeeze  2x10 5s 2x10 5s          Wall slides      Flex 3"x10 Flex/scaption 10x3" ea Flex/scap 10x 3" ea  Flex/scap x10 3" ea      Rows/extensions     ytb rows to neutral x20 grn rows to neutral x20 grn rows to neutral x20 grn rows to neutral, grn ext to neutral      AAROM abduction     standing 5" x10 standing 5" x10   standing 5"x10     ER/IR iso     5" x10 ea  5" x10 ea 5"x10 ea x10 laps ytb ER/IR x10 laps ytb ER/IR     Pulleys     Flex/scap 2 min each Flex 3'/ scap 3'  Flex 3'/scap 3' Flex 3'/scap 3'      AROM abduction      sidelying x15 w/post tilt SL 2x10 1# SL 2x10 1#  SL 2x10 2#     AROM ER     sidelying with post tile  x15 SL 2x10 1# SL 2x10 1#  SL 2x10 2#      AROM flexion      Supine w/post tilt x15 Supine 2x10 1# Supine 2x10 1#  supine 2x10 1#      A pulls             T pulls                                        AAROM flexion    w/opp UE 10"x10 within restrictions I/Y 5" x10   I/Y 5" x10 1# I/Y 5" x10 1#  I/Y 5" x10 1#      Ther Activity                                       Gait Training                                       Modalities                          HEP: German Soto

## 2022-02-22 ENCOUNTER — OFFICE VISIT (OUTPATIENT)
Dept: PHYSICAL THERAPY | Facility: CLINIC | Age: 37
End: 2022-02-22
Payer: COMMERCIAL

## 2022-02-22 DIAGNOSIS — Z48.89 AFTERCARE FOLLOWING SURGERY: Primary | ICD-10-CM

## 2022-02-22 PROCEDURE — 97140 MANUAL THERAPY 1/> REGIONS: CPT

## 2022-02-22 PROCEDURE — 97110 THERAPEUTIC EXERCISES: CPT

## 2022-02-22 NOTE — PROGRESS NOTES
Daily Note     Today's date: 2022  Patient name: Juilenne Mcconnell  : 1985  MRN: [id number]  Referring provider: Leslie Delgado PA-C  Dx:   Encounter Diagnosis     ICD-10-CM    1  Aftercare following surgery  Z48 89                   Subjective: patient reports stiffness in L shoulder especially yesterday  She reports that she has a f/u with Dr Kiran Beach on Thursday  Objective: See treatment diary below      Assessment: Added retro UBE to increase circulation prior to [facility] exercises  Progressed resisted flexion/abd to standing to further work on strengthening in a more functional position;fatigue but no pain reported  Able to perform ER/IR actively with resistance with visual and tactile cues to ensure correct technique to ensure correct exercise technique  Near full ROM in all planes  Progress as able  Plan: Continue with current POC to address pt deficits  Precautions: PER SURGEON = 6-7 weeks: Discontinue sling   Passive ROM, active assist ROM, active ROM of shoulder to 160 degrees forward flexion, 120 abduction, internal and external rotation neutral to maximum on 70 degrees  Begin light resistance training through mid range internal and external rotation without elevation  Begin periscapular strengthening and stabilization  Weeks 8 through 12: Achieve near full ROM, Increase scapular training  Increase resistance exercises, no overhead lifting  Beyond 12 weeks: Continue to work on achieving full range of motion   Begin to increase strengthening including overhead activities   At 16 weeks begin overhead strengthening and progressive strengthening for work related activities      POC expires 3/11/22            DATES: 12/23 12/28 1/11 1/28 2/1 2/8 2/11 2/15 2/22    Manuals             PROM  ES; flex, abd, ER within restrictions TB; flex, abd, ER within restictions TB: within restrictions TB: within restrictions KD TB TB TB    Gentle scar massage    TB STM UT         Rhythmic stab      KD at 80* flex TB at 80* flex  TB at 80* flex and 45* bent elbow 10"x3 ea EC TB at 90* flex and 45* bent elbow 10"x3     Neuro Re-Ed             Prone scap squeezes     10"x10 10x10" 10x10" 10x10" 10x10"    Prone scap retraction/depression      10x10" 10x10" 10x10" 10x10"    Ball on wall      20x CW, 20x CCW 20x CW, 20x CCW 20x CW ,20x CCW 2# 20x CW, 20x CCW 2# 20x CW, 20x CCW                                                                     Ther Ex      2/8 2/11 2/15 2/22    Pt edu Pt pres, anatomy, ex tech, HEP, POC, review of ROM and post op restrictions Return to activity timeline, HEP update and review  updated HEP, FOTO, discussing of current ROM restrictions/posture, objective measures         Scap squeeze  2x10 5s 2x10 5s          Wall slides      Flex 3"x10 Flex/scaption 10x3" ea Flex/scap 10x 3" ea  Flex/scap x10 3" ea  Flex/scap x10 3" ea     Rows/extensions     ytb rows to neutral x20 grn rows to neutral x20 grn rows to neutral x20 grn rows to neutral, grn ext to neutral  orange rows to neutral 2x10, orange ext to neutral 2x10    AAROM abduction     standing 5" x10 standing 5" x10   standing 5"x10 Standing 5" x10    ER/IR iso     5" x10 ea  5" x10 ea 5"x10 ea x10 laps ytb ER/IR x10 laps ytb ER/IR Active IR 2x10 ytb, active ER ytb 2x10    Pulleys     Flex/scap 2 min each Flex 3'/ scap 3'   Flex 3'/scap 3' Flex 3'/scap 3'  Flex/scap 2 min ea    AROM abduction      sidelying x15 w/post tilt SL 2x10 1# SL 2x10 1#  SL 2x10 2# scaption 2x10 2# standing to 90*    AROM ER     sidelying with post tile  x15 SL 2x10 1# SL 2x10 1#  SL 2x10 2#  SL 2x10 2#     AROM flexion      Supine w/post tilt x15 Supine 2x10 1# Supine 2x10 1#  supine 2x10 1#  Standing 2x10 1# to 90*    UBE-retro         5'    AAROM flexion    w/opp UE 10"x10 within restrictions I/Y 5" x10   I/Y 5" x10 1# I/Y 5" x10 1#  I/Y 5" x10 1#      Ther Activity                                       Gait Training Modalities                          HEP: Ajit Snider

## 2022-02-24 ENCOUNTER — OFFICE VISIT (OUTPATIENT)
Dept: OBGYN CLINIC | Facility: CLINIC | Age: 37
End: 2022-02-24

## 2022-02-24 ENCOUNTER — APPOINTMENT (OUTPATIENT)
Dept: PHYSICAL THERAPY | Facility: CLINIC | Age: 37
End: 2022-02-24
Payer: COMMERCIAL

## 2022-02-24 ENCOUNTER — APPOINTMENT (OUTPATIENT)
Dept: RADIOLOGY | Facility: CLINIC | Age: 37
End: 2022-02-24
Payer: COMMERCIAL

## 2022-02-24 VITALS
WEIGHT: 184.8 LBS | SYSTOLIC BLOOD PRESSURE: 107 MMHG | DIASTOLIC BLOOD PRESSURE: 69 MMHG | BODY MASS INDEX: 26.45 KG/M2 | HEART RATE: 73 BPM | HEIGHT: 70 IN

## 2022-02-24 DIAGNOSIS — Z48.89 AFTERCARE FOLLOWING SURGERY: Primary | ICD-10-CM

## 2022-02-24 DIAGNOSIS — Z48.89 AFTERCARE FOLLOWING SURGERY: ICD-10-CM

## 2022-02-24 PROCEDURE — 99024 POSTOP FOLLOW-UP VISIT: CPT | Performed by: ORTHOPAEDIC SURGERY

## 2022-02-24 PROCEDURE — 73030 X-RAY EXAM OF SHOULDER: CPT

## 2022-02-24 NOTE — LETTER
February 24, 2022     Patient: Samantha Preciado   YOB: 1985   Date of Visit: 2/24/2022       To Whom it May Concern:    Samantha Preciado is under my professional care  She was seen in my office on 2/24/2022  She may return to work at this time with the following restrictions:  - No pushing, pulling  - No overhead lifting  If you have any questions or concerns, please don't hesitate to call           Sincerely,          Darcy Magana,         CC: No Recipients

## 2022-02-24 NOTE — PROGRESS NOTES
Patient Name:  Ana Lopes  MRN:  [id number]    Assessment & Plan     1  Aftercare following surgery  -     XR shoulder 2+ vw left; Future; Expected date: 02/24/2022        39 y o  female approximately 11 week s/p Left AC joint reconstruction with allograft augmentation performed on 12/08/2022  X-rays reviewed in office today with patient demonstrating mild loss of AC joint reduction without known injury or mechanism  Strongly advised patient to avoid heavy lifting, cross arm adduction motion, moving/transfering patient during nursing clinicals  Patient to continue with outpatient PT with light resistance training, no heavy overhead lifting; continue to follow protocol  Provided patient with work note to avoid pushing, pulling, or lifting  If AC joint to continue to lose reduction, can consider revision surgical intervention  Will continue with post operative care at this time with outpatient PT, home exercises, and work restriction  Will follow up in office in 6 weeks for reevaluation and new x-rays upon arrival     History of the Present Illness   Ana Lopes is a 39 y o  female approximately 11 week s/p Left AC joint reconstruction with allograft augmentation performed on 12/08/2021  Today, patient reports she is doing well, continues to attend outpatient PT with home exercises  She does question why the lump on her Left shoulder increased from previous appointment  She states she noticed it one day a couple weeks ago without increased pain or mechanism  Upon evaluation, AC joint deformity noted compared to previous exam  She denies injury, heavy lifting, or pain  She has continued to follow PT protocol and work with restrictions  Review of Systems     Review of Systems   Constitutional: Negative for chills and fever  HENT: Negative for ear pain and sore throat  Eyes: Negative for pain and visual disturbance  Respiratory: Negative for cough and shortness of breath  Cardiovascular: Negative for chest pain and palpitations  Gastrointestinal: Negative for abdominal pain and vomiting  Genitourinary: Negative for dysuria and hematuria  Musculoskeletal: Negative for arthralgias and back pain  Skin: Negative for color change and rash  Neurological: Negative for seizures and syncope  All other systems reviewed and are negative  Physical Exam     /69   Pulse 73   Ht 5' 10" (1 778 m)   Wt 83 8 kg (184 lb 12 8 oz)   BMI 26 52 kg/m²     Left Shoulder:   Surgical incision well healed without evidence of infection  Obvious deformity of AC joint, not apparent at last appointment  [facility] joint displays increased displacement, reducible without pain  Active range of motion   160 degrees forward flexion  160 degrees abduction  70 degrees external rotation   There is no tenderness present over the coracoid or AC joint  The patient is neurovascularly intact distally in the extremity  Data Review     I have personally reviewed pertinent films in PACS, and my interpretation follows  X-rays taken today 2022 of Left shoulder demonstrates loss of reduction >1cm  No coracoid fracture noted  Possible cortical lucency at distal clavicle  Social History     Tobacco Use    Smoking status: Former Smoker     Packs/day: 0 00     Years: 0 00     Pack years: 0 00     Types: Cigarettes     Quit date:      Years since quittin 1    Smokeless tobacco: Former User    Tobacco comment: 0 75 ppd for 2-3 years; History of vaping    Vaping Use    Vaping Use: Former    Substances: Nicotine   Substance Use Topics    Alcohol use:  Yes     Alcohol/week: 1 0 standard drink     Types: 1 Standard drinks or equivalent per week     Comment: occasional    Drug use: No           Procedures  None     Chucky Banerjee DO

## 2022-02-25 ENCOUNTER — APPOINTMENT (OUTPATIENT)
Dept: PHYSICAL THERAPY | Facility: CLINIC | Age: 37
End: 2022-02-25
Payer: COMMERCIAL

## 2022-02-25 ENCOUNTER — TELEPHONE (OUTPATIENT)
Dept: OBGYN CLINIC | Facility: HOSPITAL | Age: 37
End: 2022-02-25

## 2022-02-25 NOTE — TELEPHONE ENCOUNTER
DR Sveta Iraheta  RE: FAX for return to work  CB# [phone]     Patient  called asking for return to work not  to be faxed to [employer] [address] [place] [address] department      I e-faxed documents to:      Zacarias William: Eliot Ramirez  FAX# [phone]

## 2022-02-28 NOTE — TELEPHONE ENCOUNTER
Dr Ry Ragsdale    Patient is requesting return to work letter  It needs to state her restrictions, and how long will she have those restrictions      Please fax #  [phone]                         Attention:  Eliot Ramirez

## 2022-03-02 NOTE — TELEPHONE ENCOUNTER
Work note updated  Patient restrictions will continue until follow up in office where new work note will be provided at that time  Thank you !

## 2022-03-07 ENCOUNTER — TELEMEDICINE (OUTPATIENT)
Dept: PSYCHIATRY | Facility: CLINIC | Age: 37
End: 2022-03-07
Payer: COMMERCIAL

## 2022-03-07 DIAGNOSIS — F41.1 GENERALIZED ANXIETY DISORDER: Primary | ICD-10-CM

## 2022-03-07 PROCEDURE — 99213 OFFICE O/P EST LOW 20 MIN: CPT | Performed by: NURSE PRACTITIONER
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
## 2022-03-09 ENCOUNTER — CLINICAL SUPPORT (OUTPATIENT)
Dept: BARIATRICS | Facility: CLINIC | Age: 37
End: 2022-03-09
Payer: COMMERCIAL

## 2022-03-09 ENCOUNTER — OFFICE VISIT (OUTPATIENT)
Dept: BARIATRICS | Facility: CLINIC | Age: 37
End: 2022-03-09

## 2022-03-09 VITALS
DIASTOLIC BLOOD PRESSURE: 64 MMHG | WEIGHT: 183.9 LBS | RESPIRATION RATE: 12 BRPM | BODY MASS INDEX: 26.33 KG/M2 | HEART RATE: 84 BPM | HEIGHT: 70 IN | TEMPERATURE: 98.8 F | SYSTOLIC BLOOD PRESSURE: 110 MMHG

## 2022-03-09 DIAGNOSIS — Z98.84 WEIGHT GAIN STATUS POST GASTRIC BYPASS: ICD-10-CM

## 2022-03-09 DIAGNOSIS — R63.5 WEIGHT GAIN STATUS POST GASTRIC BYPASS: ICD-10-CM

## 2022-03-09 DIAGNOSIS — Z98.84 S/P GASTRIC BYPASS: ICD-10-CM

## 2022-03-09 DIAGNOSIS — F41.8 DEPRESSION WITH ANXIETY: ICD-10-CM

## 2022-03-09 DIAGNOSIS — K91.2 POSTSURGICAL MALABSORPTION: ICD-10-CM

## 2022-03-09 DIAGNOSIS — E66.3 OVERWEIGHT: Primary | ICD-10-CM

## 2022-03-09 DIAGNOSIS — K58.2 IRRITABLE BOWEL SYNDROME WITH BOTH CONSTIPATION AND DIARRHEA: ICD-10-CM

## 2022-03-09 DIAGNOSIS — E53.8 VITAMIN B12 DEFICIENCY: Primary | ICD-10-CM

## 2022-03-09 PROCEDURE — 99214 OFFICE O/P EST MOD 30 MIN: CPT | Performed by: INTERNAL MEDICINE

## 2022-03-09 RX ADMIN — CYANOCOBALAMIN 1000 MCG: 1000 INJECTION INTRAMUSCULAR; SUBCUTANEOUS at 14:16

## 2022-03-09 NOTE — PROGRESS NOTES
Assessment/Plan:  Kellee Coffey was seen today for follow-up  Diagnoses and all orders for this visit:    Overweight  Postsurgical malabsorption  S/P gastric bypass  Weight gain status post gastric bypass  Be more consistent with diet, do not skip meals and pt to start exercise   Pt had stopped taking Saxenda prior to her shoulder surgery and did not resume  Interested in switching to Hope instead  - Semaglutide-Weight Management (WEGOVY) 0 25 MG/0 5ML; Inject 0 5 mL (0 25 mg total) under the skin once a week for 4 doses  Dispense: 2 mL; Refill: 0  - Semaglutide-Weight Management (WEGOVY) 0 5 MG/0 5ML; Inject 0 5 mL (0 5 mg total) under the skin once a week for 4 doses After finishing 0 25mg doses  Dispense: 2 mL; Refill: 0  - Semaglutide-Weight Management (WEGOVY) 1 MG/0 5ML; Inject 0 5 mL (1 mg total) under the skin once a week for 4 doses After finishing 4 doses of 0 5mg  Dispense: 2 mL; Refill: 0  Saxenda discontinued  Pt forgets to take bariatric vitamins- recommended to resume taking daily    IBS  Recently started on Trulance but sx's not managed very well lately  Pt to f/u with GI     Anxiety and depression  Stable on Paxil and buspar  Continue mgmt with psych for anxiety/depression    I spent 30 minutes during this visit, with >50% face-to-face time with the patient  Follow up in approximately 2 months with Non-Surgical Physician/Advanced Practitioner  Subjective:   Chief Complaint   Patient presents with    Follow-up       Patient ID: Titi Liu  is a 39 y o  female with excess weight/obesity here to pursue weight management    Patient is pursuing Conservative Program      HPI  -Initial weight loss goal of 5-10% weight loss for improved health  Wt Readings from Last 10 Encounters:   03/09/22 83 4 kg (183 lb 14 4 oz)   02/24/22 83 8 kg (184 lb 12 8 oz)   01/13/22 86 8 kg (191 lb 6 4 oz)   12/16/21 87 kg (191 lb 14 4 oz)   12/08/21 86 9 kg (191 lb 9 3 oz)   12/01/21 85 2 kg (187 lb 12 8 oz) 11/29/21 84 6 kg (186 lb 9 6 oz)   11/23/21 87 1 kg (192 lb)   10/13/21 88 2 kg (194 lb 6 4 oz)   08/17/21 91 3 kg (201 lb 4 8 oz)     S/p RYGB 12/2019  Initial weight at the time of surgery: 355 lbs  Hi weight: 199 lbs    Initial weight MWM: 201 lbs  (8/2021)  Current weight: 183 lbs   Change in weight: -18 lbs (-3 lbs since LOV)  Goal: 170 lbs    Previously tried topamax but worsened depression so discontinued  Started on Saxenda in mid-October, was up to 1 8mg (could not increase due to constipation)  Has been off of it since December when she had the shoulder surgery  States she was down to 178, then gained up to 191 lbs and has lost some weight recently  Decreased appetite dt to lack of activity since having her shoulder surgery  B- when not working late breakfast- protein shake or new directions with coffee, yogurt with fruit   S-   L- skips often  S-   D- has more cravings for sweets   protein veggies, small starch  S-   Drinks- 64 oz water or more  1 cup of coffee  Alcohol- no  Sleep - not using CPAP currently as the repeat study showed mild CELE   Exercise- started walking 1-2 miles a day      The following portions of the patient's history were reviewed and updated as appropriate: allergies, current medications, past family history, past medical history, past social history, past surgical history, and problem list     Review of Systems   Respiratory: Negative  Cardiovascular: Negative  Gastrointestinal: Positive for constipation  Psychiatric/Behavioral: Negative  Objective:  /64 (BP Location: Right arm, Patient Position: Sitting, Cuff Size: Standard)   Pulse 84   Temp 98 8 °F (37 1 °C) (Tympanic)   Resp 12   Ht 5' 10" (1 778 m)   Wt 83 4 kg (183 lb 14 4 oz)   BMI 26 39 kg/m²   Constitutional: Well-developed, well-nourished and obese Body mass index is 26 39 kg/m²  Oswaldo Quan HEENT: No conjunctival pallor or jaundice    Pulmonary: No increased work of breathing or signs of respiratory distress  CV: Normal rate, well-perfused  GI: Obese  Non-distended  MSK: No edema   Neuro: Oriented to person, place and time  Normal Speech  Normal gait  Psych: Normal affect and mood       Labs and Imaging  Reviewed

## 2022-03-11 ENCOUNTER — OFFICE VISIT (OUTPATIENT)
Dept: GASTROENTEROLOGY | Facility: CLINIC | Age: 37
End: 2022-03-11
Payer: COMMERCIAL

## 2022-03-11 VITALS
WEIGHT: 182.8 LBS | HEART RATE: 82 BPM | BODY MASS INDEX: 26.17 KG/M2 | DIASTOLIC BLOOD PRESSURE: 60 MMHG | HEIGHT: 70 IN | SYSTOLIC BLOOD PRESSURE: 100 MMHG

## 2022-03-11 DIAGNOSIS — K59.00 CONSTIPATION, UNSPECIFIED CONSTIPATION TYPE: Primary | ICD-10-CM

## 2022-03-11 PROCEDURE — 99213 OFFICE O/P EST LOW 20 MIN: CPT | Performed by: PHYSICIAN ASSISTANT

## 2022-03-11 NOTE — LETTER
March 11, 2022     Roger Dempsey, [address]    Patient: Rishi Mak   YOB: 1985   Date of Visit: 3/11/2022       Dear Dr Deep Leroy: Thank you for referring Rishi Mak to me for evaluation  Below are my notes for this consultation  If you have questions, please do not hesitate to call me  I look forward to following your patient along with you  Sincerely,        Henna Oropeza PA-C        CC: No Recipients  Hai Hess  3/11/2022  8:24 AM  Sign when Signing Visit  [facility] - Outpatient Follow-up Note  Rishi Mak 39 y o  female MRN: [id number]  Encounter: [id number]          ASSESSMENT AND PLAN:      1  Constipation, unspecified constipation type  -Will stop Trulance   -Will start Miralax daily    -Continue daily exercise and fiber     -No plans for repeat colonoscopy at this time  ______________________________________________________________________    SUBJECTIVE:   72-year-old female presents the office today for GI follow-up  Patient does suffer from constipation and has been maintained on Trulance for several years  She reports she is now experience thing occasional urgency and diarrhea  Patient is status post gastric bypass and has been eating very high-fiber and exercising daily  She drinks a significant amount of water  She denies any alarm symptoms  She has lost half of her weight  We have discussed cutting back on her Trulance and starting MiraLax 1 capful daily  She reports that when she does get too constipated she will experience abdominal discomfort and gas  REVIEW OF SYSTEMS IS OTHERWISE NEGATIVE        Historical Information   Past Medical History:   Diagnosis Date    Abnormal Pap smear of cervix     Anxiety     ASCUS with positive high risk HPV cervical 11/6/2018    Back pain     Depressed     Depression     Femur fracture ([address] )     Head injury     concussion in  fell off a horse    HPV (human papilloma virus) infection     Hypertension     Hx post surgery stable    Irritable bowel syndrome     Morbid obesity with BMI of 50 0-59 9, adult (HCC)     Obesity     Panic attack     Postgastrectomy malabsorption     Psychiatric illness     PTSD (post-traumatic stress disorder)     Sleep apnea     mild     Past Surgical History:   Procedure Laterality Date    ABDOMINAL SURGERY      Gastric Bypass, 2019    CHOLECYSTECTOMY  10/2020    CHOLECYSTECTOMY LAPAROSCOPIC N/A 10/20/2020    Procedure: CHOLECYSTECTOMY LAPAROSCOPIC W/ INTRAOP CHOLANGIOGRAM;  Surgeon: Ely Zapien MD;  [facility];  Service: General    COLPOSCOPY  2018    EGD      ERCP      FASCIOTOMY Left     left calf    FEMUR FRACTURE SURGERY Right     [place] LAP GASTRIC BYPASS/ROWAN-EN-Y N/A 2019    Procedure: BYPASS GASTRIC  ROWAN-EN-Y LAPAROSCOPIC WITH INTRAOPERATIVE EGD;  Surgeon: Cordelia Cheng MD;  Location: [address] [place] [address];  Service: Bariatrics    UT OPEN RX A-C JT DISLOC,FASCIAL  [facility] Left 2021    Procedure: RECONSTRUCTION LEFT ACROMIOCLAVICULAR JOINT, allograft augmentation;  Surgeon: Crystal Mercer DO;  [facility];  Service: Orthopedics    WISDOM TOOTH EXTRACTION       Social History   Social History     Substance and Sexual Activity   Alcohol Use Yes    Alcohol/week: 1 0 standard drink    Types: 1 Standard drinks or equivalent per week    Comment: occasional     Social History     Substance and Sexual Activity   Drug Use No     Social History     Tobacco Use   Smoking Status Former Smoker    Packs/day: 0 00    Years: 0 00    Pack years: 0 00    Types: Cigarettes    Quit date:     Years since quittin 1   Smokeless Tobacco Former User   Tobacco Comment    0 75 ppd for 2-3 years;  History of vaping      Family History   Problem Relation Age of Onset    Rheum arthritis Mother     Fibroids Mother     Obesity Mother  Depression Mother     Hypertension Mother     Substance Abuse Mother     Skin cancer Mother     Cancer Mother     Diabetes Father     Hyperlipidemia Father     Hypertension Father     Thyroid disease Father     Substance Abuse Father     Prostate cancer Father     Gout Father     Cancer Father     Depression Sister     Depression Maternal Grandmother     Diabetes Maternal Grandfather     Depression Maternal Grandfather     Breast cancer Neg Hx     Ovarian cancer Neg Hx     Uterine cancer Neg Hx     Cervical cancer Neg Hx        Meds/Allergies       Current Outpatient Medications:     busPIRone (BUSPAR) 15 mg tablet    calcium citrate-vitamin d (Calcium Citrate Chewy Bite) 500-500 MG-UNIT CHEW chewable tablet    docusate sodium (COLACE) 100 mg capsule    MIRENA, 52 MG, 20 MCG/24HR IUD    Multiple Vitamin (MULTI VITAMIN PO)    nystatin (MYCOSTATIN) cream    ondansetron (ZOFRAN-ODT) 4 mg disintegrating tablet    PARoxetine (PAXIL) 30 mg tablet    Probiotic Product (PROBIOTIC PO)    traZODone (DESYREL) 100 mg tablet    Trulance 3 MG TABS    acyclovir (ZOVIRAX) 400 MG tablet    dicyclomine (BENTYL) 20 mg tablet    ondansetron (Zofran ODT) 4 mg disintegrating tablet    Semaglutide-Weight Management (WEGOVY) 0 25 MG/0 5ML    Semaglutide-Weight Management (WEGOVY) 0 5 MG/0 5ML    Semaglutide-Weight Management (WEGOVY) 1 MG/0 5ML    Current Facility-Administered Medications:     cyanocobalamin injection 1,000 mcg, 1,000 mcg, Intramuscular, Q30 Days, 1,000 mcg at 03/09/22 1416    cyanocobalamin injection 1,000 mcg, 1,000 mcg, Intramuscular, Q30 Days, 1,000 mcg at 11/26/21 1544    Allergies   Allergen Reactions    Other Nasal Congestion     Seasonal allergies             Objective     Blood pressure 100/60, pulse 82, height 5' 10" (1 778 m), weight 82 9 kg (182 lb 12 8 oz), not currently breastfeeding  Body mass index is 26 23 kg/m²        PHYSICAL EXAM:      General Appearance:   Alert, cooperative, no distress   HEENT:   Normocephalic, atraumatic, anicteric      Neck:  Supple, symmetrical, trachea midline   Lungs:   Clear to auscultation bilaterally; no rales, rhonchi or wheezing; respirations unlabored    Heart[de-identified]   Regular rate and rhythm; no murmur, rub, or gallop  Abdomen:   Soft, non-tender, non-distended; normal bowel sounds; no masses, no organomegaly    Genitalia:   Deferred    Rectal:   Deferred    Extremities:  No cyanosis, clubbing or edema    Pulses:  2+ and symmetric    Skin:  No jaundice, rashes, or lesions    Lymph nodes:  No palpable cervical lymphadenopathy        Lab Results:   No visits with results within 1 Day(s) from this visit  Latest known visit with results is:   Admission on 12/08/2021, Discharged on 12/08/2021   Component Date Value    EXT Preg Test, Ur 12/08/2021 Negative     Control 12/08/2021 Valid          Radiology Results:   XR shoulder 2+ vw left    Result Date: 2/25/2022  Narrative: LEFT SHOULDER INDICATION:   Z48 89: Encounter for other specified surgical aftercare  COMPARISON:  1/13/2022 VIEWS:  XR SHOULDER 2+ VW LEFT Images: 3 FINDINGS: There has been widening of the coracoclavicular interval since the prior study  Currently the interval measures 2 6 cm whereas it measured 1 cm previously  No evidence for fracture  Stable button hardware along the dorsum of the distal left clavicle  No significant degenerative changes  No lytic or blastic osseous lesion  Soft tissues are unremarkable  Impression: Widening of the coracoclavicular interval since the prior study suggesting loss of reduction  No evidence for fracture  The referring physician is aware of the findings   Workstation performed: [id number]

## 2022-03-11 NOTE — PROGRESS NOTES
[facility] - Outpatient Follow-up Note  Juliane Stevens 39 y o  female MRN: [id number]  Encounter: [id number]          ASSESSMENT AND PLAN:      1  Constipation, unspecified constipation type  -Will stop Trulance   -Will start Miralax daily    -Continue daily exercise and fiber     -No plans for repeat colonoscopy at this time  ______________________________________________________________________    SUBJECTIVE:   80-year-old female presents the office today for GI follow-up  Patient does suffer from constipation and has been maintained on Trulance for several years  She reports she is now experience thing occasional urgency and diarrhea  Patient is status post gastric bypass and has been eating very high-fiber and exercising daily  She drinks a significant amount of water  She denies any alarm symptoms  She has lost half of her weight  We have discussed cutting back on her Trulance and starting MiraLax 1 capful daily  She reports that when she does get too constipated she will experience abdominal discomfort and gas  REVIEW OF SYSTEMS IS OTHERWISE NEGATIVE        Historical Information   Past Medical History:   Diagnosis Date    Abnormal Pap smear of cervix     Anxiety     ASCUS with positive high risk HPV cervical 11/6/2018    Back pain     Depressed     Depression     Femur fracture (HCC)     Head injury     concussion in 2001 fell off a horse    HPV (human papilloma virus) infection     Hypertension     Hx post surgery stable    Irritable bowel syndrome     Morbid obesity with BMI of 50 0-59 9, adult (HCC)     Obesity     Panic attack     Postgastrectomy malabsorption     Psychiatric illness     PTSD (post-traumatic stress disorder)     Sleep apnea     mild     Past Surgical History:   Procedure Laterality Date    ABDOMINAL SURGERY      Gastric Bypass, December 2019    CHOLECYSTECTOMY  10/2020    CHOLECYSTECTOMY LAPAROSCOPIC N/A 10/20/2020 Procedure: CHOLECYSTECTOMY LAPAROSCOPIC W/ INTRAOP CHOLANGIOGRAM;  Surgeon: Iman Becker MD;  [facility];  Service: General    COLPOSCOPY  2018    EGD      ERCP      FASCIOTOMY Left     left calf    FEMUR FRACTURE SURGERY Right     [place] LAP GASTRIC BYPASS/ROWAN-EN-Y N/A 2019    Procedure: BYPASS GASTRIC  ROWAN-EN-Y LAPAROSCOPIC WITH INTRAOPERATIVE EGD;  Surgeon: Erin Metcalf MD;  Location: [address] [place] [address];  Service: Bariatrics    [place] OPEN RX A-C JT DISLOC,FASCIAL  [facility] Left 2021    Procedure: RECONSTRUCTION LEFT ACROMIOCLAVICULAR JOINT, allograft augmentation;  Surgeon: Jailene Sol DO;  [facility];  Service: Orthopedics    WISDOM TOOTH EXTRACTION       Social History   Social History     Substance and Sexual Activity   Alcohol Use Yes    Alcohol/week: 1 0 standard drink    Types: 1 Standard drinks or equivalent per week    Comment: occasional     Social History     Substance and Sexual Activity   Drug Use No     Social History     Tobacco Use   Smoking Status Former Smoker    Packs/day: 0 00    Years: 0 00    Pack years: 0 00    Types: Cigarettes    Quit date:     Years since quittin 1   Smokeless Tobacco Former User   Tobacco Comment    0 75 ppd for 2-3 years;  History of vaping      Family History   Problem Relation Age of Onset    Rheum arthritis Mother     Fibroids Mother     Obesity Mother     Depression Mother     Hypertension Mother     Substance Abuse Mother     Skin cancer Mother    [place] Cancer Mother     Diabetes Father     Hyperlipidemia Father     Hypertension Father     Thyroid disease Father     Substance Abuse Father     Prostate cancer Father     Gout Father     Cancer Father     Depression Sister     Depression Maternal Grandmother     Diabetes Maternal Grandfather     Depression Maternal Grandfather     Breast cancer Neg Hx     Ovarian cancer Neg Hx     Uterine cancer Neg Hx     Cervical cancer Neg Hx Meds/Allergies       Current Outpatient Medications:     busPIRone (BUSPAR) 15 mg tablet    calcium citrate-vitamin d (Calcium Citrate Chewy Bite) 500-500 MG-UNIT CHEW chewable tablet    docusate sodium (COLACE) 100 mg capsule    MIRENA, 52 MG, 20 MCG/24HR IUD    Multiple Vitamin (MULTI VITAMIN PO)    nystatin (MYCOSTATIN) cream    ondansetron (ZOFRAN-ODT) 4 mg disintegrating tablet    PARoxetine (PAXIL) 30 mg tablet    Probiotic Product (PROBIOTIC PO)    traZODone (DESYREL) 100 mg tablet    Trulance 3 MG TABS    acyclovir (ZOVIRAX) 400 MG tablet    dicyclomine (BENTYL) 20 mg tablet    ondansetron (Zofran ODT) 4 mg disintegrating tablet    Semaglutide-Weight Management (WEGOVY) 0 25 MG/0 5ML    Semaglutide-Weight Management (WEGOVY) 0 5 MG/0 5ML    Semaglutide-Weight Management (WEGOVY) 1 MG/0 5ML    Current Facility-Administered Medications:     cyanocobalamin injection 1,000 mcg, 1,000 mcg, Intramuscular, Q30 Days, 1,000 mcg at 03/09/22 1416    cyanocobalamin injection 1,000 mcg, 1,000 mcg, Intramuscular, Q30 Days, 1,000 mcg at 11/26/21 1544    Allergies   Allergen Reactions    Other Nasal Congestion     Seasonal allergies             Objective     Blood pressure 100/60, pulse 82, height 5' 10" (1 778 m), weight 82 9 kg (182 lb 12 8 oz), not currently breastfeeding  Body mass index is 26 23 kg/m²  PHYSICAL EXAM:      General Appearance:   Alert, cooperative, no distress   HEENT:   Normocephalic, atraumatic, anicteric      Neck:  Supple, symmetrical, trachea midline   Lungs:   Clear to auscultation bilaterally; no rales, rhonchi or wheezing; respirations unlabored    Heart[de-identified]   Regular rate and rhythm; no murmur, rub, or gallop     Abdomen:   Soft, non-tender, non-distended; normal bowel sounds; no masses, no organomegaly    Genitalia:   Deferred    Rectal:   Deferred    Extremities:  No cyanosis, clubbing or edema    Pulses:  2+ and symmetric    Skin:  No jaundice, rashes, or lesions  Lymph nodes:  No palpable cervical lymphadenopathy        Lab Results:   No visits with results within 1 Day(s) from this visit  Latest known visit with results is:   Admission on 12/08/2021, Discharged on 12/08/2021   Component Date Value    EXT Preg Test, Ur 12/08/2021 Negative     Control 12/08/2021 Valid          Radiology Results:   XR shoulder 2+ vw left    Result Date: 2/25/2022  Narrative: LEFT SHOULDER INDICATION:   Z48 89: Encounter for other specified surgical aftercare  COMPARISON:  1/13/2022 VIEWS:  XR SHOULDER 2+ VW LEFT Images: 3 FINDINGS: There has been widening of the coracoclavicular interval since the prior study  Currently the interval measures 2 6 cm whereas it measured 1 cm previously  No evidence for fracture  Stable button hardware along the dorsum of the distal left clavicle  No significant degenerative changes  No lytic or blastic osseous lesion  Soft tissues are unremarkable  Impression: Widening of the coracoclavicular interval since the prior study suggesting loss of reduction  No evidence for fracture  The referring physician is aware of the findings   Workstation performed: [id number]

## 2022-03-11 NOTE — PATIENT INSTRUCTIONS
Constipation   WHAT YOU NEED TO KNOW:   Constipation means you have hard, dry bowel movements, or you go longer than usual between bowel movements  DISCHARGE INSTRUCTIONS:   Call your doctor if:   · You have blood in your bowel movements  · You have a fever and abdominal pain with the constipation  · Your constipation gets worse  · You start to vomit  · You have questions or concerns about your condition or care  Medicines:   · Medicine  such as a laxative may help relax and loosen your intestines to help you have a bowel movement  Your provider may recommend you only use laxatives for a short time  Long-term use may make your bowels dependent on the medicine  · Take your medicine as directed  Contact your healthcare provider if you think your medicine is not helping or if you have side effects  Tell him of her if you are allergic to any medicine  Keep a list of the medicines, vitamins, and herbs you take  Include the amounts, and when and why you take them  Bring the list or the pill bottles to follow-up visits  Carry your medicine list with you in case of an emergency  Relieve constipation:   · A suppository  may be used to help soften your bowel movements  This may make them easier to pass  A suppository is guided into your rectum through your anus  · An enema  is liquid medicine used to clear bowel movement from your rectum  The medicine is put into your rectum through your anus  Prevent constipation:   · Drink liquids as directed  You may need to drink extra liquids to help soften and move your bowels  Ask how much liquid to drink each day and which liquids are best for you  · Eat high-fiber foods  This may help decrease constipation by adding bulk to your bowel movements  High-fiber foods include fruit, vegetables, whole-grain breads and cereals, and beans  Your healthcare provider or dietitian can help you create a high-fiber meal plan   Your provider may also recommend a fiber supplement if you cannot get enough fiber from food  · Exercise regularly  Regular physical activity can help stimulate your intestines  Walking is a good exercise to prevent or relieve constipation  Ask which exercises are best for you  · Schedule a time each day to have a bowel movement  This may help train your body to have regular bowel movements  Bend forward while you are on the toilet to help move the bowel movement out  Sit on the toilet for at least 10 minutes, even if you do not have a bowel movement  · Talk to your healthcare provider about your medicines  Certain medicines, such as opioids, can cause constipation  Your provider may be able to make medicine changes  For example, he or she may change the kind of medicine, or change when you take it  Follow up with your doctor as directed:  Write down your questions so you remember to ask them during your visits  © Copyright [employer] 2022 Information is for End User's use only and may not be sold, redistributed or otherwise used for commercial purposes  All illustrations and images included in CareNotes® are the copyrighted property of [employer]  or [facility] [place] Michele Lin   The above information is an  only  It is not intended as medical advice for individual conditions or treatments  Talk to your doctor, nurse or pharmacist before following any medical regimen to see if it is safe and effective for you

## 2022-03-21 ENCOUNTER — PATIENT MESSAGE (OUTPATIENT)
Dept: BARIATRICS | Facility: CLINIC | Age: 37
End: 2022-03-21

## 2022-03-28 ENCOUNTER — APPOINTMENT (OUTPATIENT)
Dept: RADIOLOGY | Facility: CLINIC | Age: 37
End: 2022-03-28
Payer: COMMERCIAL

## 2022-03-28 ENCOUNTER — OFFICE VISIT (OUTPATIENT)
Dept: OBGYN CLINIC | Facility: CLINIC | Age: 37
End: 2022-03-28
Payer: COMMERCIAL

## 2022-03-28 VITALS
WEIGHT: 183.4 LBS | HEART RATE: 89 BPM | BODY MASS INDEX: 26.26 KG/M2 | DIASTOLIC BLOOD PRESSURE: 53 MMHG | SYSTOLIC BLOOD PRESSURE: 89 MMHG | HEIGHT: 70 IN

## 2022-03-28 DIAGNOSIS — Z48.89 AFTERCARE FOLLOWING SURGERY: Primary | ICD-10-CM

## 2022-03-28 DIAGNOSIS — Z48.89 AFTERCARE FOLLOWING SURGERY: ICD-10-CM

## 2022-03-28 PROCEDURE — 73030 X-RAY EXAM OF SHOULDER: CPT

## 2022-03-28 PROCEDURE — 99213 OFFICE O/P EST LOW 20 MIN: CPT | Performed by: ORTHOPAEDIC SURGERY

## 2022-03-28 NOTE — PROGRESS NOTES
Patient Name:  Lynette Burch  MRN:  [id number]    Assessment & Plan     1  Aftercare following surgery  -     XR shoulder 2+ vw left; Future; Expected date: 03/28/2022      39 y o  female approximately 16 week s/p Left AC joint reconstruction with allograft augmentation performed on 12/08/2022  New x-rays reviewed with patient demonstrating increased loss of reduction of AC joint as compared to previous images  At this time, will move forward with CT scan of Left shoulder to evaluate clavicle and coracoid, possible suture cut out, rule out fracture  Advised patient to continue with gentle ROM of Left shoulder  Will follow up in office after CT scan performed to discuss further steps including possible revision surgical intervention  Will have to evaluate bone quality, clavicle and coracoid post CT scan for planning  Strongly advised patient to conitnue administration of calcium and vitamin D as previously prescribed  History of the Present Illness   Lynette Burch is a 39 y o  female approximately 16 week s/p Left AC joint reconstruction with allograft augmentation performed on 12/08/2022  Today, patient reports increased loss of reduction of AC joint since last appointment  She reported decreased PT and range of motion while at home and is confused why this is happening  She again reports this initiated after waking up one morning  She is concerned about repeat surgery as 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  Review of Systems     Review of Systems   Constitutional: Negative for chills and fever  HENT: Negative for ear pain and sore throat  Eyes: Negative for pain and visual disturbance  Respiratory: Negative for cough and shortness of breath  Cardiovascular: Negative for chest pain and palpitations  Gastrointestinal: Negative for abdominal pain and vomiting  Genitourinary: Negative for dysuria and hematuria     Musculoskeletal: Negative for arthralgias and back pain  Skin: Negative for color change and rash  Neurological: Negative for seizures and syncope  All other systems reviewed and are negative  Physical Exam     BP (!) 89/53   Pulse 89   Ht 5' 10" (1 778 m)   Wt 83 2 kg (183 lb 6 4 oz)   BMI 26 32 kg/m²     Left Shoulder:   Surgical incision well healed without evidence of infection  There is no tenderness present over the [facility] joint, coracoid, clavicle, over orthopedic button  AC joint reducible in office without pain  Special testing deferred at this time  The patient is neurovascularly intact distally in the extremity  Data Review     I have personally reviewed pertinent films in PACS, and my interpretation follows  X-rays taken today 2022 of Left shoulder/AC joint demonstrates increased loss of reduction of AC joint, button in place with increased diameter of drill hole of clavicle     Social History     Tobacco Use    Smoking status: Former Smoker     Packs/day: 0 00     Years: 0 00     Pack years: 0 00     Types: Cigarettes     Quit date:      Years since quittin 2    Smokeless tobacco: Former User    Tobacco comment: 0 75 ppd for 2-3 years; History of vaping    Vaping Use    Vaping Use: Former    Substances: Nicotine   Substance Use Topics    Alcohol use:  Yes     Alcohol/week: 1 0 standard drink     Types: 1 Standard drinks or equivalent per week     Comment: occasional    Drug use: No           Procedures  None     Patricio Wilson PA-C

## 2022-03-29 ENCOUNTER — TELEPHONE (OUTPATIENT)
Dept: BARIATRICS | Facility: CLINIC | Age: 37
End: 2022-03-29

## 2022-03-29 DIAGNOSIS — K91.2 POSTSURGICAL MALABSORPTION: Primary | ICD-10-CM

## 2022-03-29 NOTE — TELEPHONE ENCOUNTER
Patient called with concerns for bone damage s/p joint reconstruction in her Left shoulder  Being worked up by [facility] [employer]  Will order PTH, Vitamin D, calcium labs and she should continue with Vitamin D 5,000IU and calcium citrate chews 500mg TID for now

## 2022-04-06 ENCOUNTER — APPOINTMENT (OUTPATIENT)
Dept: LAB | Facility: CLINIC | Age: 37
End: 2022-04-06
Payer: COMMERCIAL

## 2022-04-06 DIAGNOSIS — K91.2 POSTSURGICAL MALABSORPTION: ICD-10-CM

## 2022-04-06 LAB
25(OH)D3 SERPL-MCNC: 48.4 NG/ML (ref 30–100)
ALBUMIN SERPL BCP-MCNC: 3.7 G/DL (ref 3.5–5)
ALP SERPL-CCNC: 114 U/L (ref 46–116)
ALT SERPL W P-5'-P-CCNC: 37 U/L (ref 12–78)
ANION GAP SERPL CALCULATED.3IONS-SCNC: 3 MMOL/L (ref 4–13)
AST SERPL W P-5'-P-CCNC: 15 U/L (ref 5–45)
BILIRUB SERPL-MCNC: 0.69 MG/DL (ref 0.2–1)
BUN SERPL-MCNC: 14 MG/DL (ref 5–25)
CA-I BLD-SCNC: 1.19 MMOL/L (ref 1.12–1.32)
CALCIUM SERPL-MCNC: 9.1 MG/DL (ref 8.3–10.1)
CHLORIDE SERPL-SCNC: 108 MMOL/L (ref 100–108)
CO2 SERPL-SCNC: 28 MMOL/L (ref 21–32)
CREAT SERPL-MCNC: 0.97 MG/DL (ref 0.6–1.3)
GFR SERPL CREATININE-BSD FRML MDRD: 75 ML/MIN/1.73SQ M
GLUCOSE P FAST SERPL-MCNC: 86 MG/DL (ref 65–99)
POTASSIUM SERPL-SCNC: 4 MMOL/L (ref 3.5–5.3)
PROT SERPL-MCNC: 6.5 G/DL (ref 6.4–8.2)
PTH-INTACT SERPL-MCNC: 67.6 PG/ML (ref 18.4–80.1)
SODIUM SERPL-SCNC: 139 MMOL/L (ref 136–145)

## 2022-04-06 PROCEDURE — 82330 ASSAY OF CALCIUM: CPT

## 2022-04-06 PROCEDURE — 82306 VITAMIN D 25 HYDROXY: CPT

## 2022-04-06 PROCEDURE — 36415 COLL VENOUS BLD VENIPUNCTURE: CPT

## 2022-04-06 PROCEDURE — 83970 ASSAY OF PARATHORMONE: CPT

## 2022-04-06 PROCEDURE — 80053 COMPREHEN METABOLIC PANEL: CPT

## 2022-04-11 DIAGNOSIS — F41.1 GENERALIZED ANXIETY DISORDER: ICD-10-CM

## 2022-04-11 RX ORDER — BUSPIRONE HYDROCHLORIDE 15 MG/1
15 TABLET ORAL 2 TIMES DAILY
Qty: 180 TABLET | Refills: 0 | Status: SHIPPED | OUTPATIENT
Start: 2022-04-11 | End: 2022-06-13 | Stop reason: SDUPTHER

## 2022-04-11 RX ORDER — PAROXETINE 30 MG/1
60 TABLET, FILM COATED ORAL EVERY MORNING
Qty: 180 TABLET | Refills: 0 | Status: SHIPPED | OUTPATIENT
Start: 2022-04-11

## 2022-04-25 ENCOUNTER — TELEMEDICINE (OUTPATIENT)
Dept: PSYCHIATRY | Facility: CLINIC | Age: 37
End: 2022-04-25
Payer: COMMERCIAL

## 2022-04-25 DIAGNOSIS — F33.42 RECURRENT MAJOR DEPRESSIVE DISORDER, IN FULL REMISSION (HCC): Primary | ICD-10-CM

## 2022-04-25 DIAGNOSIS — F41.1 GENERALIZED ANXIETY DISORDER: ICD-10-CM

## 2022-04-25 DIAGNOSIS — G47.00 INSOMNIA, UNSPECIFIED TYPE: ICD-10-CM

## 2022-04-25 PROCEDURE — 99214 OFFICE O/P EST MOD 30 MIN: CPT | Performed by: NURSE PRACTITIONER

## 2022-04-25 RX ORDER — TRAZODONE HYDROCHLORIDE 100 MG/1
100 TABLET ORAL
Qty: 90 TABLET | Refills: 0 | Status: SHIPPED | OUTPATIENT
Start: 2022-04-25 | End: 2022-06-13 | Stop reason: SDUPTHER

## 2022-04-27 ENCOUNTER — OFFICE VISIT (OUTPATIENT)
Dept: FAMILY MEDICINE CLINIC | Facility: CLINIC | Age: 37
End: 2022-04-27
Payer: COMMERCIAL

## 2022-04-27 ENCOUNTER — HOSPITAL ENCOUNTER (OUTPATIENT)
Dept: CT IMAGING | Facility: CLINIC | Age: 37
Discharge: HOME | End: 2022-04-27
Payer: COMMERCIAL

## 2022-04-27 VITALS
SYSTOLIC BLOOD PRESSURE: 108 MMHG | DIASTOLIC BLOOD PRESSURE: 80 MMHG | HEART RATE: 85 BPM | TEMPERATURE: 96 F | BODY MASS INDEX: 24.91 KG/M2 | HEIGHT: 70 IN | OXYGEN SATURATION: 96 % | WEIGHT: 174 LBS

## 2022-04-27 DIAGNOSIS — Z48.89 AFTERCARE FOLLOWING SURGERY: ICD-10-CM

## 2022-04-27 DIAGNOSIS — F41.1 GENERALIZED ANXIETY DISORDER: ICD-10-CM

## 2022-04-27 DIAGNOSIS — I95.0 IDIOPATHIC HYPOTENSION: ICD-10-CM

## 2022-04-27 DIAGNOSIS — Z98.84 S/P GASTRIC BYPASS: Primary | ICD-10-CM

## 2022-04-27 PROCEDURE — 73200 CT UPPER EXTREMITY W/O DYE: CPT

## 2022-04-27 PROCEDURE — 99214 OFFICE O/P EST MOD 30 MIN: CPT | Performed by: FAMILY MEDICINE
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
## 2022-04-27 NOTE — PROGRESS NOTES
Assessment/Plan:     Chronic Problems:  No problem-specific Assessment & Plan notes found for this encounter  Visit Diagnosis:  Diagnoses and all orders for this visit:    S/P gastric bypass  Comments:  Stable continued follow-up with bariatric specialty  Orders:  -     CBC and differential; Future    Generalized anxiety disorder  Comments:  Stable continued care with therapist continue present medications    Idiopathic hypotension  Comments:  Discussed potential causative factors with patient, stressed importance of proper adequate hydration throughout day, avoidance of caffeinated products proper di          Subjective:    Patient ID: Austin Adair is a 39 y o  female      Questions concerning bp and questions in regard to labs   Has had episodes upon arising quickly feeling a bit dizzy lightheaded  Not persistent in been  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,  does not drink enough fluids throughout the day, does tend to drink a bit more coffee in the morning at times poor dietary intake may all be relative and related  Denies any vision changes headache, negative falls injuries  Finished rn school , 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 [employer]   Weight loss down by 193lb  [facility] 2019   Insurance denied in regard to pannus removal, status post bariatric weight loss  Has scheduled follow-up with bariatric  Anxiety depression has spoken to counselor or therapist no significant changes doing well now that regard continues with medications  Once again looking forward to new career unfortunately concerned about current providers may change do to insurance      The following portions of the patient's history were reviewed and updated as appropriate: allergies, current medications, past family history, past medical history, past social history, past surgical history and problem list     Review of Systems   Constitutional: Negative for appetite change, chills, fever and unexpected weight change  HENT: Negative for congestion, dental problem, ear pain, hearing loss, postnasal drip, rhinorrhea, sinus pressure, sinus pain, sneezing, sore throat, tinnitus and voice change  Eyes: Negative for visual disturbance  Respiratory: Negative for apnea, cough, chest tightness and shortness of breath  Cardiovascular: Negative for chest pain, palpitations and leg swelling  Gastrointestinal: Negative for abdominal pain, blood in stool, constipation, diarrhea, nausea and vomiting  Endocrine: Negative for cold intolerance, heat intolerance, polydipsia, polyphagia and polyuria  Genitourinary: Negative for decreased urine volume, difficulty urinating, dysuria, frequency and hematuria  Musculoskeletal: Negative for arthralgias, back pain, gait problem, joint swelling and myalgias  Skin: Negative for color change, rash and wound  Allergic/Immunologic: Negative for environmental allergies and food allergies  Neurological: Negative for dizziness, syncope, weakness, light-headedness, numbness and headaches  Hematological: Negative for adenopathy  Does not bruise/bleed easily  Psychiatric/Behavioral: Negative for sleep disturbance and suicidal ideas  The patient is not nervous/anxious  /80   Pulse 85   Temp (!) 96 °F (35 6 °C) (Tympanic)   Ht 5' 10" (1 778 m)   Wt 78 9 kg (174 lb)   SpO2 96%   BMI 24 97 kg/m²   Social History     Socioeconomic History    Marital status: /Civil Union     Spouse name: Not on file    Number of children: 2    Years of education: college    Highest education level: Associate degree: occupational, technical, or vocational program   Occupational History    Not on file   Tobacco Use    Smoking status: Former Smoker     Packs/day: 0 00     Years: 0 00     Pack years: 0 00     Types: Cigarettes     Quit date:      Years since quittin 3    Smokeless tobacco: Former User    Tobacco comment: 0 75 ppd for 2-3 years;  History of vaping Vaping Use    Vaping Use: Former    Substances: Nicotine   Substance and Sexual Activity    Alcohol use: Yes     Alcohol/week: 1 0 standard drink     Types: 1 Standard drinks or equivalent per week     Comment: occasional    Drug use: No    Sexual activity: Yes     Partners: Male     Birth control/protection: I U D       Comment: mirena inserted 9/25/18   Other Topics Concern    Not on file   Social History Narrative    Lives with boyfriend and his kids (part time)     Works at [employer] Strain: Not on file   Food Insecurity: Not on file   Transportation Needs: Not on file   Physical Activity: Not on file   Stress: Not on file   Social Connections: Not on file   Intimate Partner Violence: Not At Risk    Fear of Current or Ex-Partner: No    Emotionally Abused: No    Physically Abused: No    Sexually Abused: No   Housing Stability: Not on file     Past Medical History:   Diagnosis Date    Abnormal Pap smear of cervix     Anxiety     ASCUS with positive high risk HPV cervical 11/6/2018    Back pain     Depressed     Depression     [facility] fracture ([facility] )     Head injury     concussion in 2001 fell off a horse    HPV (human papilloma virus) infection     Hypertension     Hx post surgery stable    Irritable bowel syndrome     Morbid obesity with BMI of 50 0-59 9, adult ([facility] )     Obesity     Panic attack     Postgastrectomy malabsorption     Psychiatric illness     PTSD (post-traumatic stress disorder)     Sleep apnea     mild     Family History   Problem Relation Age of Onset    Rheum arthritis Mother     Fibroids Mother     Obesity Mother     Depression Mother     Hypertension Mother     Substance Abuse Mother     Skin cancer Mother     Cancer Mother     Diabetes Father     Hyperlipidemia Father     Hypertension Father     Thyroid disease Father     Substance Abuse Father     Prostate cancer Father     Gout Father     Cancer Father     Depression Sister     Depression Maternal Grandmother     Diabetes Maternal Grandfather     Depression Maternal Grandfather     Breast cancer Neg Hx     Ovarian cancer Neg Hx     Uterine cancer Neg Hx     Cervical cancer Neg Hx      Past Surgical History:   Procedure Laterality Date    ABDOMINAL SURGERY      Gastric Bypass, December 2019    CHOLECYSTECTOMY  10/2020    CHOLECYSTECTOMY LAPAROSCOPIC N/A 10/20/2020    Procedure: CHOLECYSTECTOMY LAPAROSCOPIC W/ INTRAOP CHOLANGIOGRAM;  Surgeon: Liang Rock MD;  [facility];  Service: General    COLPOSCOPY  9/2018    EGD      ERCP      FASCIOTOMY Left     left calf    FEMUR FRACTURE SURGERY Right     AK LAP GASTRIC BYPASS/ROWAN-EN-Y N/A 12/30/2019    Procedure: BYPASS GASTRIC  ROWAN-EN-Y LAPAROSCOPIC WITH INTRAOPERATIVE EGD;  Surgeon: Victorino Gary MD;  Location: [facility];  Service: Bariatrics    AK OPEN RX A-C JT DISLOC,FASCIAL 2011 [facility] Left 12/8/2021    Procedure: RECONSTRUCTION LEFT ACROMIOCLAVICULAR JOINT, allograft augmentation;  Surgeon: Odilon Galvan DO;  [facility]: [facility];  Service: Orthopedics    WISDOM TOOTH EXTRACTION         Current Outpatient Medications:     busPIRone (BUSPAR) 15 mg tablet, Take 1 tablet (15 mg total) by mouth 2 (two) times a day, Disp: 180 tablet, Rfl: 0    calcium citrate-vitamin d (Calcium Citrate Chewy Bite) 500-500 MG-UNIT CHEW chewable tablet, Chew 1 tablet 3 (three) times a day, Disp: , Rfl:     docusate sodium (COLACE) 100 mg capsule, Take 100 mg by mouth 2 (two) times a day, Disp: , Rfl:     MIRENA, 52 MG, 20 MCG/24HR IUD, TO BE INSERTED ONE TIME BY PRESCRIBER   ROUTE INTRAUTERINE , Disp: , Rfl: 0    Multiple Vitamin (MULTI VITAMIN PO), Take 1 tablet by mouth daily , Disp: , Rfl:     nystatin (MYCOSTATIN) cream, Apply topically 2 (two) times a day, Disp: 30 g, Rfl: 1    ondansetron (ZOFRAN-ODT) 4 mg disintegrating tablet, DISSOLVE ONE TABLET BY MOUTH EVERY 6 HOURS AS NEEDED FOR NAUSEA OR VOMITING, Disp: 30 tablet, Rfl: 0    PARoxetine (PAXIL) 30 mg tablet, Take 2 tablets (60 mg total) by mouth every morning, Disp: 180 tablet, Rfl: 0    Probiotic Product (PROBIOTIC PO), Take by mouth, Disp: , Rfl:     traZODone (DESYREL) 100 mg tablet, Take 1 tablet (100 mg total) by mouth daily at bedtime as needed for sleep, Disp: 90 tablet, Rfl: 0    acyclovir (ZOVIRAX) 400 MG tablet, Take 1 tablet (400 mg total) by mouth 4 (four) times a day for 5 days, Disp: 3020 tablet, Rfl: 0    dicyclomine (BENTYL) 20 mg tablet, Take 1 tablet (20 mg total) by mouth every 6 (six) hours (Patient not taking: Reported on 3/11/2022 ), Disp: 30 tablet, Rfl: 3    ondansetron (Zofran ODT) 4 mg disintegrating tablet, Take 1 tablet (4 mg total) by mouth every 6 (six) hours as needed for nausea or vomiting (Patient not taking: Reported on 3/11/2022 ), Disp: 20 tablet, Rfl: 0    Trulance 3 MG TABS, TAKE ONE TABLET BY MOUTH EVERY DAY (Patient not taking: Reported on 3/28/2022), Disp: 30 tablet, Rfl: 5    Current Facility-Administered Medications:     cyanocobalamin injection 1,000 mcg, 1,000 mcg, Intramuscular, Q30 Days, Jay Mercer MD, 1,000 mcg at 03/09/22 1416    cyanocobalamin injection 1,000 mcg, 1,000 mcg, Intramuscular, Q30 Days, Polly Woodruff PA-C, 1,000 mcg at 11/26/21 1544    Allergies   Allergen Reactions    Other Nasal Congestion     Seasonal allergies            Lab Review   Appointment on 04/06/2022   Component Date Value    Vit D, 25-Hydroxy 04/06/2022 48 4     PTH 04/06/2022 67 6     Sodium 04/06/2022 139     Potassium 04/06/2022 4 0     Chloride 04/06/2022 108     CO2 04/06/2022 28     ANION GAP 04/06/2022 3*    BUN 04/06/2022 14     Creatinine 04/06/2022 0 97     Glucose, Fasting 04/06/2022 86     Calcium 04/06/2022 9 1     AST 04/06/2022 15     ALT 04/06/2022 37     Alkaline Phosphatase 04/06/2022 114     Total Protein 04/06/2022 6 5     Albumin 04/06/2022 3 7     Total Bilirubin 04/06/2022 0 69     eGFR 04/06/2022 75     Calcium, Ionized 04/06/2022 1 19    Admission on 12/08/2021, Discharged on 12/08/2021   Component Date Value    EXT Preg Test, Ur 12/08/2021 Negative     Control 12/08/2021 Valid    Appointment on 12/06/2021   Component Date Value    AST 12/06/2021 44    Office Visit on 12/01/2021   Component Date Value    ALT 12/06/2021 95*   Appointment on 12/01/2021   Component Date Value    Vitamin B-12 12/01/2021 951*    WBC 12/01/2021 5 08     RBC 12/01/2021 4 54     Hemoglobin 12/01/2021 13 9     Hematocrit 12/01/2021 43 2     MCV 12/01/2021 95     MCH 12/01/2021 30 6     MCHC 12/01/2021 32 2     RDW 12/01/2021 12 2     MPV 12/01/2021 10 5     Platelets 76/75/9174 229     nRBC 12/01/2021 0     Neutrophils Relative 12/01/2021 53     Immat GRANS % 12/01/2021 0     Lymphocytes Relative 12/01/2021 39     Monocytes Relative 12/01/2021 6     Eosinophils Relative 12/01/2021 2     Basophils Relative 12/01/2021 0     Neutrophils Absolute 12/01/2021 2 69     Immature Grans Absolute 12/01/2021 0 01     Lymphocytes Absolute 12/01/2021 1 97     Monocytes Absolute 12/01/2021 0 30     Eosinophils Absolute 12/01/2021 0 09     Basophils Absolute 12/01/2021 0 02     Sodium 12/01/2021 145     Potassium 12/01/2021 4 3     Chloride 12/01/2021 107     CO2 12/01/2021 31     ANION GAP 12/01/2021 7     BUN 12/01/2021 15     Creatinine 12/01/2021 0 87     Glucose, Fasting 12/01/2021 81     Calcium 12/01/2021 8 7     AST 12/01/2021 111*    ALT 12/01/2021 150*    Alkaline Phosphatase 12/01/2021 102     Total Protein 12/01/2021 6 6     Albumin 12/01/2021 3 7     Total Bilirubin 12/01/2021 0 65     eGFR 12/01/2021 87     Folate 12/01/2021 13 0     Zinc 12/01/2021 101     Vit D, 25-Hydroxy 12/01/2021 34 7     Vitamin B1, Whole Blood 12/01/2021 148  9     Vitamin A 12/01/2021 21 9     Cholesterol 12/01/2021 132     Triglycerides 12/01/2021 40     HDL, Direct 12/01/2021 75     LDL Calculated 12/01/2021 49     Non-HDL-Chol (CHOL-HDL) 12/01/2021 57     PTH 12/01/2021 64 3     QFT Nil 12/01/2021 0 03     QFT TB1-NIL 12/01/2021 -0 01     QFT TB2-NIL 12/01/2021 -0 01     QFT Mitogen-NIL 12/01/2021 >10 00     QFT Final Interpretation 12/01/2021 Negative     Iron Saturation 12/01/2021 33     TIBC 12/01/2021 318     Iron 12/01/2021 106     Ferritin 12/01/2021 226    Orders Only on 10/29/2021   Component Date Value    SARS-CoV-2 10/29/2021 Negative         Imaging: XR shoulder 2+ vw left    Result Date: 3/31/2022  Narrative: LEFT SHOULDER INDICATION:   Z48 89: Encounter for other specified surgical aftercare  COMPARISON:  Compared to 2/24/2022 VIEWS:  XR SHOULDER 2+ VW LEFT FINDINGS: Worsening of the [facility]  dislocation measuring 2 1 cm compared to 1 8 cm with radiopaque button on the clavicle being unchanged  No lytic or blastic osseous lesion  Soft tissues are unremarkable  Impression: Widening of the [facility] joint with loss of reduction  Findings concur with the preliminary report by the referring clinician already in PACS and/or our electronic record EPIC  Workstation performed: [id number]       Objective:     Physical Exam  Constitutional:       General: She is not in acute distress  Appearance: She is well-developed  She is not ill-appearing or toxic-appearing  HENT:      Head: Normocephalic and atraumatic  Eyes:      General: No scleral icterus  Conjunctiva/sclera: Conjunctivae normal    Cardiovascular:      Rate and Rhythm: Normal rate and regular rhythm  Pulses: Normal pulses  Heart sounds: Normal heart sounds  Pulmonary:      Effort: Pulmonary effort is normal       Breath sounds: Normal breath sounds  Musculoskeletal:         General: Normal range of motion  Cervical back: Normal range of motion and neck supple  Right lower leg: No edema  Left lower leg: No edema  Skin:     General: Skin is warm and dry  Neurological:      Mental Status: She is alert and oriented to person, place, and time  Gait: Gait normal       Deep Tendon Reflexes: Reflexes are normal and symmetric  Psychiatric:         Mood and Affect: Mood normal          Behavior: Behavior normal          Thought Content: Thought content normal          Judgment: Judgment normal            There are no Patient Instructions on file for this visit  LUCRECIA Patel    Portions of the record may have been created with voice recognition software  Occasional wrong word or "sound a like" substitutions may have occurred due to the inherent limitations of voice recognition software  Read the chart carefully and recognize, using context, where substitutions have occurred

## 2022-05-04 ENCOUNTER — PREP FOR PROCEDURE (OUTPATIENT)
Dept: BARIATRICS | Facility: CLINIC | Age: 37
End: 2022-05-04

## 2022-05-04 DIAGNOSIS — Z98.84 STATUS POST BARIATRIC SURGERY: Primary | ICD-10-CM

## 2022-05-05 ENCOUNTER — OFFICE VISIT (OUTPATIENT)
Dept: OBGYN CLINIC | Facility: CLINIC | Age: 37
End: 2022-05-05
Payer: COMMERCIAL

## 2022-05-05 VITALS
SYSTOLIC BLOOD PRESSURE: 118 MMHG | DIASTOLIC BLOOD PRESSURE: 81 MMHG | BODY MASS INDEX: 25.34 KG/M2 | WEIGHT: 177 LBS | HEIGHT: 70 IN | HEART RATE: 73 BPM

## 2022-05-05 DIAGNOSIS — S43.102D DISLOCATION OF LEFT ACROMIOCLAVICULAR JOINT, SUBSEQUENT ENCOUNTER: Primary | ICD-10-CM

## 2022-05-05 DIAGNOSIS — Z48.89 AFTERCARE FOLLOWING SURGERY: ICD-10-CM

## 2022-05-05 PROCEDURE — 99213 OFFICE O/P EST LOW 20 MIN: CPT | Performed by: ORTHOPAEDIC SURGERY

## 2022-05-05 NOTE — PROGRESS NOTES
Patient Name:  Jodie Serna  MRN:  [id number]    Assessment & Plan     1  Dislocation of left acromioclavicular joint, subsequent encounter    2  Aftercare following surgery        39 y o  female approximately 5 month s/p Left shoulder AC joint reconstruction with allograft augmentation with loss of reduction performed on 12/08/2021  Reviewed CT scan with patient  In depth conversation had with patient regarding nonoperative vs surgical management of Left shoulder AC joint with loss of reduction  At this time, patient would like to hold off on surgical intervention and continue nonoperative management as she will be starting a new job in the summer  Advised patient she may work as tolerated, continue strengthening of periscapular and postural musculature and perform pec stretches  If in the future patient wishes to proceed with revision [facility] joint reconstruction, advised patient to reach out to the office for reevaluation and discussion  Verbalized understanding of the above and will follow up in office on an as needed basis  History of the Present Illness   Jodie Serna is a 39 y o  female approximately 5 month s/p Left shoulder AC joint reconstruction with allograft augmentation with loss of reduction performed on 12/08/2021  Today, patient reports she feels good other than since tightness of Left shoulder  She still notes bump over [facility] joint  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 [place] this summer  She has been taking Vitamin D and calcium  Review of Systems     Review of Systems   Constitutional: Negative for chills and fever  HENT: Negative for congestion  Respiratory: Negative for cough, chest tightness and shortness of breath  Cardiovascular: Negative for chest pain and palpitations  Gastrointestinal: Negative for abdominal pain  Endocrine: Negative for cold intolerance and heat intolerance  Neurological: Negative for syncope  Psychiatric/Behavioral: Negative for confusion  Physical Exam     /81   Pulse 73   Ht 5' 10" (1 778 m)   Wt 80 3 kg (177 lb)   BMI 25 40 kg/m²     Left Shoulder:   Surgical incision well healed without signs of infection  Active range of motion   170 degrees forward flexion  170 degrees abduction  70-80 degrees external rotation   Mid thoracic internal rotation    Obvious deformity of AC joint as seen pre operatively  There is no tenderness present [facility] [place]  There is 5/5 strength with external rotation testing at the side  Empty can testing is negative   The patient is neurovascularly intact distally in the extremity  Data Review     I have personally reviewed pertinent films in PACS, and my interpretation follows  CT scan of Left shoulder demonstrates loss of reduction of AC Joint s/p reconstruction surgery  No evidence of fracture of coracoid or clavicle  Social History     Tobacco Use    Smoking status: Former Smoker     Packs/day: 0 00     Years: 0 00     Pack years: 0 00     Types: Cigarettes     Quit date:      Years since quittin 3    Smokeless tobacco: Former User    Tobacco comment: 0 75 ppd for 2-3 years; History of vaping    Vaping Use    Vaping Use: Former    Substances: Nicotine   Substance Use Topics    Alcohol use:  Yes     Alcohol/week: 1 0 standard drink     Types: 1 Standard drinks or equivalent per week     Comment: occasional    Drug use: No           Procedures  None     Levonia Ards, DO

## 2022-05-17 ENCOUNTER — CLINICAL SUPPORT (OUTPATIENT)
Dept: BARIATRICS | Facility: CLINIC | Age: 37
End: 2022-05-17
Payer: COMMERCIAL

## 2022-05-17 ENCOUNTER — TELEPHONE (OUTPATIENT)
Dept: BARIATRICS | Facility: CLINIC | Age: 37
End: 2022-05-17

## 2022-05-17 ENCOUNTER — PREP FOR PROCEDURE (OUTPATIENT)
Dept: BARIATRICS | Facility: CLINIC | Age: 37
End: 2022-05-17

## 2022-05-17 DIAGNOSIS — Z98.84 STATUS POST BARIATRIC SURGERY: Primary | ICD-10-CM

## 2022-05-17 DIAGNOSIS — E53.8 VITAMIN B12 DEFICIENCY: Primary | ICD-10-CM

## 2022-05-17 NOTE — TELEPHONE ENCOUNTER
Pt would like to know if she is to continue the B12 inj monthly?  If so, please place orders within Epic     Thank you :)

## 2022-05-18 RX ADMIN — CYANOCOBALAMIN 1000 MCG: 1000 INJECTION INTRAMUSCULAR; SUBCUTANEOUS at 14:39

## 2022-05-18 NOTE — TELEPHONE ENCOUNTER
Patient does not need to get B12 shots, she can continue with oral B12 and let us know if she prefers to do monthly B12 shots in future   Thanks

## 2022-06-09 ENCOUNTER — PATIENT MESSAGE (OUTPATIENT)
Dept: BARIATRICS | Facility: CLINIC | Age: 37
End: 2022-06-09

## 2022-06-09 DIAGNOSIS — S43.102D AC JOINT DISLOCATION, LEFT, SUBSEQUENT ENCOUNTER: ICD-10-CM

## 2022-06-13 ENCOUNTER — TELEMEDICINE (OUTPATIENT)
Dept: PSYCHIATRY | Facility: CLINIC | Age: 37
End: 2022-06-13
Payer: COMMERCIAL

## 2022-06-13 DIAGNOSIS — F41.1 GENERALIZED ANXIETY DISORDER: ICD-10-CM

## 2022-06-13 DIAGNOSIS — G47.00 INSOMNIA, UNSPECIFIED TYPE: ICD-10-CM

## 2022-06-13 PROCEDURE — 99214 OFFICE O/P EST MOD 30 MIN: CPT | Performed by: NURSE PRACTITIONER

## 2022-06-13 RX ORDER — BUSPIRONE HYDROCHLORIDE 15 MG/1
15 TABLET ORAL 2 TIMES DAILY
Qty: 180 TABLET | Refills: 0 | Status: SHIPPED | OUTPATIENT
Start: 2022-06-13

## 2022-06-13 RX ORDER — TRAZODONE HYDROCHLORIDE 100 MG/1
100 TABLET ORAL
Qty: 90 TABLET | Refills: 0 | Status: SHIPPED | OUTPATIENT
Start: 2022-06-13
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
## 2022-06-14 RX ORDER — ONDANSETRON 4 MG/1
4 TABLET, ORALLY DISINTEGRATING ORAL EVERY 6 HOURS PRN
Qty: 20 TABLET | Refills: 0 | Status: SHIPPED | OUTPATIENT
Start: 2022-06-14

## 2022-06-15 NOTE — BH TREATMENT PLAN
TREATMENT PLAN (Medication Management Only)        [facility]    Name and Date of Birth:  Austin Adair 39 y o  1985  Date of Treatment Plan: Mayi 15, 2022  Diagnosis/Diagnoses:    1  Generalized anxiety disorder    2  Insomnia, unspecified type      Strengths/Personal Resources for Self-Care: supportive family, taking medications as prescribed, self-reliance  Area/Areas of need (in own words): anxiety symptoms, depressive symptoms  1  Long Term Goal: continue improvement in acceptable anxiety level  Target Date:6 months - 12/15/2022  Person/Persons responsible for completion of goal: Kelsey English  2  Short Term Objective (s) - How will we reach this goal?:   A  Provider new recommended medication/dosage changes and/or continue medication(s): continue current medications as prescribed  Moni foster healthy diet   C  Get regular sleep every night   Target Date:6 months - 12/15/2022  Person/Persons Responsible for Completion of Goal: Kelsey English  Progress Towards Goals: continuing treatment, improving  Treatment Modality: medication management every 4 weeks, medication education at every visit  Review due 180 days from date of this plan: 6 months - 12/15/2022  Expected length of service: ongoing treatment  My Physician/PA/NP and I have developed this plan together and I agree to work on the goals and objectives  I understand the treatment goals that were developed for my treatment  Treatment Plan done but not signed at time of office visit due to:  Plan reviewed by phone or in person  and verbal consent given due to telehealth appointment

## 2022-06-17 ENCOUNTER — HOSPITAL ENCOUNTER (OUTPATIENT)
Dept: PERIOP | Facility: HOSPITAL | Age: 37
Setting detail: OUTPATIENT SURGERY
Discharge: HOME | End: 2022-06-17
Attending: SURGERY
Payer: COMMERCIAL

## 2022-06-17 ENCOUNTER — ANESTHESIA (OUTPATIENT)
Dept: PERIOP | Facility: HOSPITAL | Age: 37
End: 2022-06-17

## 2022-06-17 ENCOUNTER — ANESTHESIA EVENT (OUTPATIENT)
Dept: PERIOP | Facility: HOSPITAL | Age: 37
End: 2022-06-17

## 2022-06-17 VITALS
BODY MASS INDEX: 25.37 KG/M2 | RESPIRATION RATE: 16 BRPM | HEART RATE: 64 BPM | WEIGHT: 177.2 LBS | HEIGHT: 70 IN | SYSTOLIC BLOOD PRESSURE: 116 MMHG | TEMPERATURE: 97.6 F | OXYGEN SATURATION: 100 % | DIASTOLIC BLOOD PRESSURE: 71 MMHG

## 2022-06-17 DIAGNOSIS — Z98.84 STATUS POST BARIATRIC SURGERY: ICD-10-CM

## 2022-06-17 PROCEDURE — 43239 EGD BIOPSY SINGLE/MULTIPLE: CPT | Performed by: SURGERY

## 2022-06-17 PROCEDURE — 88305 TISSUE EXAM BY PATHOLOGIST: CPT | Performed by: SPECIALIST

## 2022-06-17 RX ORDER — LIDOCAINE HYDROCHLORIDE 10 MG/ML
0.5 INJECTION, SOLUTION EPIDURAL; INFILTRATION; INTRACAUDAL; PERINEURAL ONCE AS NEEDED
Status: CANCELLED | OUTPATIENT
Start: 2022-06-17

## 2022-06-17 RX ORDER — PROPOFOL 10 MG/ML
INJECTION, EMULSION INTRAVENOUS AS NEEDED
Status: DISCONTINUED | OUTPATIENT
Start: 2022-06-17 | End: 2022-06-17

## 2022-06-17 RX ORDER — LIDOCAINE HYDROCHLORIDE 10 MG/ML
INJECTION, SOLUTION EPIDURAL; INFILTRATION; INTRACAUDAL; PERINEURAL AS NEEDED
Status: DISCONTINUED | OUTPATIENT
Start: 2022-06-17 | End: 2022-06-17

## 2022-06-17 RX ORDER — SODIUM CHLORIDE, SODIUM LACTATE, POTASSIUM CHLORIDE, CALCIUM CHLORIDE 600; 310; 30; 20 MG/100ML; MG/100ML; MG/100ML; MG/100ML
INJECTION, SOLUTION INTRAVENOUS CONTINUOUS PRN
Status: DISCONTINUED | OUTPATIENT
Start: 2022-06-17 | End: 2022-06-17

## 2022-06-17 RX ORDER — SODIUM CHLORIDE, SODIUM LACTATE, POTASSIUM CHLORIDE, CALCIUM CHLORIDE 600; 310; 30; 20 MG/100ML; MG/100ML; MG/100ML; MG/100ML
50 INJECTION, SOLUTION INTRAVENOUS CONTINUOUS
Status: DISCONTINUED | OUTPATIENT
Start: 2022-06-17 | End: 2022-06-21 | Stop reason: HOSPADM

## 2022-06-17 RX ORDER — SODIUM CHLORIDE, SODIUM LACTATE, POTASSIUM CHLORIDE, CALCIUM CHLORIDE 600; 310; 30; 20 MG/100ML; MG/100ML; MG/100ML; MG/100ML
125 INJECTION, SOLUTION INTRAVENOUS CONTINUOUS
Status: CANCELLED | OUTPATIENT
Start: 2022-06-17

## 2022-06-17 RX ADMIN — SODIUM CHLORIDE, SODIUM LACTATE, POTASSIUM CHLORIDE, AND CALCIUM CHLORIDE: .6; .31; .03; .02 INJECTION, SOLUTION INTRAVENOUS at 08:00

## 2022-06-17 RX ADMIN — SODIUM CHLORIDE, SODIUM LACTATE, POTASSIUM CHLORIDE, AND CALCIUM CHLORIDE 50 ML/HR: .6; .31; .03; .02 INJECTION, SOLUTION INTRAVENOUS at 07:10

## 2022-06-17 RX ADMIN — PROPOFOL 40 MG: 10 INJECTION, EMULSION INTRAVENOUS at 08:09

## 2022-06-17 RX ADMIN — PROPOFOL 130 MG: 10 INJECTION, EMULSION INTRAVENOUS at 08:05

## 2022-06-17 RX ADMIN — LIDOCAINE HYDROCHLORIDE 50 MG: 10 INJECTION, SOLUTION EPIDURAL; INFILTRATION; INTRACAUDAL; PERINEURAL at 08:05

## 2022-06-17 RX ADMIN — PROPOFOL 40 MG: 10 INJECTION, EMULSION INTRAVENOUS at 08:06

## 2022-06-17 NOTE — H&P
This is a 39 y o  female with a history of morbid obesity s/p LRYGB 12/2019 presenting with epigastric pain and Body mass index is 25 43 kg/m²  Here for an EGD to evaluate the anatomy of the GI tract and to rule out the presence of H  pylori  Physical Exam    /68   Pulse 64   Temp 97 6 °F (36 4 °C) (Temporal)   Resp 18   Ht 5' 10" (1 778 m)   Wt 80 4 kg (177 lb 3 2 oz)   LMP 05/22/2022 (Within Days)   SpO2 100%   BMI 25 43 kg/m²    AAOx3  RRR  CTA B  Abdomen benign  A/P:    This is a 39 y o  female with a history of morbid obesity s/p LRYGB 12/2019 presenting with epigastric pain and Body mass index is 25 43 kg/m²       Will proceed with the EGD and biopsies        Mary Villalta MD  6/17/2022  7:22 AM

## 2022-06-17 NOTE — ANESTHESIA PREPROCEDURE EVALUATION
Procedure:  EGD    Relevant Problems   ANESTHESIA (within normal limits)      CARDIO (within normal limits)      ENDO (within normal limits)      MUSCULOSKELETAL   (+) DJD (degenerative joint disease)      NEURO/PSYCH   (+) Generalized anxiety disorder   (+) Recurrent major depressive disorder, in full remission ([facility] [address] )      PULMONARY   (+) Obstructive sleep apnea   (+) Obstructive sleep apnea (adult) (pediatric)      Digestive   (+) Postsurgical malabsorption      Other   (+) IUD (intrauterine device) in place   (+) S/P gastric bypass        Physical Exam    Airway    Mallampati score: I  TM Distance: >3 FB  Neck ROM: full     Dental   No notable dental hx     Cardiovascular      Pulmonary      Other Findings  Nose jewelry present  Would not interfere with intubation, or mask ventilation  Anesthesia Plan  ASA Score- 2     Anesthesia Type- IV sedation with anesthesia with ASA Monitors  Additional Monitors:   Airway Plan:           Plan Factors-Exercise tolerance (METS): >4 METS  Chart reviewed  Existing labs reviewed  Patient summary reviewed  Patient is not a current smoker  Induction-     Postoperative Plan-     Informed Consent- Anesthetic plan and risks discussed with patient  I personally reviewed this patient with the CRNA  Discussed and agreed on the Anesthesia Plan with the CRNA  Laine Connors

## 2022-06-17 NOTE — ANESTHESIA POSTPROCEDURE EVALUATION
Post-Op Assessment Note    CV Status:  Stable  Pain Score: 0    Pain management: adequate     Mental Status:  Awake   Hydration Status:  Stable   PONV Controlled:  None      Post Op Vitals Reviewed: Yes      Staff: CRNA         No complications documented      BP      Temp      Pulse     Resp      SpO2   100

## 2022-10-17 DIAGNOSIS — F41.1 GENERALIZED ANXIETY DISORDER: ICD-10-CM

## 2022-10-17 RX ORDER — BUSPIRONE HYDROCHLORIDE 15 MG/1
15 TABLET ORAL 2 TIMES DAILY
Qty: 60 TABLET | Refills: 0 | Status: SHIPPED | OUTPATIENT
Start: 2022-10-17

## 2022-10-17 RX ORDER — PAROXETINE 30 MG/1
60 TABLET, FILM COATED ORAL EVERY MORNING
Qty: 60 TABLET | Refills: 0 | Status: SHIPPED | OUTPATIENT
Start: 2022-10-17

## 2022-12-13 DIAGNOSIS — G47.00 INSOMNIA, UNSPECIFIED TYPE: ICD-10-CM

## 2022-12-13 DIAGNOSIS — F41.1 GENERALIZED ANXIETY DISORDER: ICD-10-CM

## 2022-12-14 RX ORDER — PAROXETINE 30 MG/1
60 TABLET, FILM COATED ORAL EVERY MORNING
Qty: 180 TABLET | Refills: 0 | Status: SHIPPED | OUTPATIENT
Start: 2022-12-14

## 2022-12-14 RX ORDER — TRAZODONE HYDROCHLORIDE 100 MG/1
100 TABLET ORAL
Qty: 90 TABLET | Refills: 0 | Status: SHIPPED | OUTPATIENT
Start: 2022-12-14

## 2022-12-14 RX ORDER — BUSPIRONE HYDROCHLORIDE 15 MG/1
15 TABLET ORAL 2 TIMES DAILY
Qty: 180 TABLET | Refills: 0 | Status: SHIPPED | OUTPATIENT
Start: 2022-12-14

## 2022-12-14 NOTE — TELEPHONE ENCOUNTER
Will put in for 3 months, but in order to receive another refill will need to see me again for an appointment  Thanks!

## 2022-12-27 DIAGNOSIS — B00.1 COLD SORE: ICD-10-CM

## 2022-12-27 RX ORDER — ACYCLOVIR 400 MG/1
400 TABLET ORAL 4 TIMES DAILY
Qty: 3020 TABLET | Refills: 0 | Status: SHIPPED | OUTPATIENT
Start: 2022-12-27 | End: 2023-01-01

## 2023-03-19 NOTE — PROGRESS NOTES
Pt presents for IV fluids  Pt offers no complaints, resting comfortably in chair  Vitals stable  Call bell within reach, will continue to monitor 
Pt tolerated treatment well  Appointment schedule printed, next appointment confirmed 
General

## 2024-07-26 NOTE — TELEPHONE ENCOUNTER
Pre-op call was made to patient to follow up on how they are doing and to remind them to continue with all medical and dietary directions that were given at pre-op class regarding liver shrinking diet and hydration  They were encouraged to purchase all vitamins and protein shakes for post op use as well as to begin Miralax three days prior to surgery as directed in Section 6 of their manual   They were reminded of the Ensure Pre-surgery drinks protocol and to bring their completed yellow form with them to surgery as well as their CPAP-BiPAP machine if they use one  Lastly, they were informed that they would be weighed the morning of surgery and to give the office a call if they had any further questions or concerns  Patient did receive SW inbasket return message, she appreciated the follow up and identified that she just feeling emotional given that she is on the all liquid diet  She feels like she has good coping skills to manage her feelings and is hopeful and ready for her surgery on Monday  oral

## 2024-08-20 NOTE — UTILIZATION REVIEW
Notification of Inpatient Admission/Inpatient Authorization Request   This is a Notification of Inpatient Admission for [address] [facility]  Be advised that this patient was admitted to our facility under Inpatient Status  Contact Leanne Sousa at [phone] for additional admission information  [address] DEPT  DEDICATED -[phone]  Patient Name:   Bel Lorenz   YOB: 1985       [address]   [address]:   [address]  Tax ID: [id number]  NPI: [id number] Attending Provider/NPI: Darlene Cervantes Md [[id number]]      Place of Service Code: 24     Place of Service Name:  [address] [place] [address]   Start Date: 12/30/19 1125     Discharge Date & Time: No discharge date for patient encounter  Type of Admission: Inpatient Status Discharge Disposition (if discharged): Home/Self Care   Patient Diagnoses: Morbid obesity ([facility] [address] ) [E66 01]  Obstructive sleep apnea [G47 33]     Orders: Admission Orders (From admission, onward)     Ordered        12/30/19 1129  Inpatient Admission  Once                    Assigned Utilization Review Contact: Santhosh Sousa  Utilization   Network Utilization Review Department  Phone: [phone]; Fax [phone]  Email: [email or web address]   ATTENTION PAYERS: Please call the assigned Utilization  directly with any questions or concerns ALL voicemails in the department are confidential  Send all requests for admission clinical reviews, approved or denied determinations and any other requests to dedicated fax number belonging to the campus where the patient is receiving treatment  Second attempt- Writer has made a second attempt at calling the patient. Writer was informed that patient's cell phone number is no longer their number. Writer has attempted to delete patient's old cell phone number and called their home phone afterwards.   Writer left a voicemail on patient's home phone asking for the patient to reach out to the clinic to schedule a appointment to re-establish care.

## 2024-11-01 NOTE — TELEPHONE ENCOUNTER
Currently asymptomatic.  Relatively normal stress test in November 2023. Continue current.    Orders:    Lipid panel; Future     Patient telephones with concerns of hunger and not feeling full  She wonders once she starts pureed stage, which she is very excited about, if she should go over the 1/4 cup total and eat til a feeling of fullness  I cautioned patient not to do so, but to very closely follow her fred-bible  Encouraged her to stay hydrated, follow the 30/60 rule, and to be mindful with her food/fluid intake  Questioned whether this was real physical hunger or head hunger  Patient verbalized understanding and appreciation, as she had been suspecting just this  Patient is feeling well and feels she is healing wonderfully  Confirmed her appointment at our office this Friday

## (undated) DEVICE — TROCAR: Brand: KII FIOS FIRST ENTRY

## (undated) DEVICE — SCD SEQUENTIAL COMPRESSION COMFORT SLEEVE MEDIUM KNEE LENGTH: Brand: KENDALL SCD

## (undated) DEVICE — DRAPE C-ARM X-RAY

## (undated) DEVICE — BAG DECANTER

## (undated) DEVICE — HEWSON SUTURE RETRIEVER: Brand: HEWSON SUTURE RETRIEVER

## (undated) DEVICE — SUT VICRYL 2-0 CT-1 27 IN J259H

## (undated) DEVICE — [HIGH FLOW INSUFFLATOR,  DO NOT USE IF PACKAGE IS DAMAGED,  KEEP DRY,  KEEP AWAY FROM SUNLIGHT,  PROTECT FROM HEAT AND RADIOACTIVE SOURCES.]: Brand: PNEUMOSURE

## (undated) DEVICE — ADHESIVE SKIN CLSR DERMABOND NX

## (undated) DEVICE — DRAPE EQUIPMENT RF WAND

## (undated) DEVICE — INTENDED FOR TISSUE SEPARATION, AND OTHER PROCEDURES THAT REQUIRE A SHARP SURGICAL BLADE TO PUNCTURE OR CUT.: Brand: BARD-PARKER SAFETY BLADES SIZE 10, STERILE

## (undated) DEVICE — SPONGE LAP 18 X 18 IN STRL RFD

## (undated) DEVICE — SUT PDS II 3-0 SH 27 IN Z316H

## (undated) DEVICE — LIGAMAX 5 MM ENDOSCOPIC MULTIPLE CLIP APPLIER: Brand: LIGAMAX

## (undated) DEVICE — IV CATH 14 G X 1.75

## (undated) DEVICE — 3M™ STERI-STRIP™ REINFORCED ADHESIVE SKIN CLOSURES, R1547, 1/2 IN X 4 IN (12 MM X 100 MM), 6 STRIPS/ENVELOPE: Brand: 3M™ STERI-STRIP™

## (undated) DEVICE — KERLIX BANDAGE ROLL: Brand: KERLIX

## (undated) DEVICE — 10 MM BABCOCKS WITH RATCHET HANDLES: Brand: ENDOPATH

## (undated) DEVICE — LIGHT HANDLE COVER SLEEVE DISP BLUE STELLAR

## (undated) DEVICE — SUT 2 FIBERLOOP AR-7234

## (undated) DEVICE — 3M™ TEGADERM™ TRANSPARENT FILM DRESSING FRAME STYLE, 1624W, 2-3/8 IN X 2-3/4 IN (6 CM X 7 CM), 100/CT 4CT/CASE: Brand: 3M™ TEGADERM™

## (undated) DEVICE — NEEDLE SPINAL 20G X 3.5 LF

## (undated) DEVICE — CHOLE CATH KIT ARROW

## (undated) DEVICE — IRRIG ENDO FLO TUBING

## (undated) DEVICE — GLOVE INDICATOR PI UNDERGLOVE SZ 7 BLUE

## (undated) DEVICE — CHLORAPREP HI-LITE 26ML ORANGE

## (undated) DEVICE — INTENDED FOR TISSUE SEPARATION, AND OTHER PROCEDURES THAT REQUIRE A SHARP SURGICAL BLADE TO PUNCTURE OR CUT.: Brand: BARD-PARKER SAFETY BLADES SIZE 11, STERILE

## (undated) DEVICE — ENDOPATH 5MM CURVED SCISSORS WITH MONOPOLAR CAUTERY: Brand: ENDOPATH

## (undated) DEVICE — SURGICAL GOWN, XL SMARTSLEEVE: Brand: CONVERTORS

## (undated) DEVICE — BETHLEHEM UNIV MAJOR ORTHO,KIT: Brand: CARDINAL HEALTH

## (undated) DEVICE — SYRINGE 20ML LL

## (undated) DEVICE — GLOVE INDICATOR PI UNDERGLOVE SZ 7.5 BLUE

## (undated) DEVICE — VISUALIZATION SYSTEM: Brand: CLEARIFY

## (undated) DEVICE — SUT VICRYL 4-0 PS-2 27 IN J426H

## (undated) DEVICE — 3M™ STERI-STRIP™ REINFORCED ADHESIVE SKIN CLOSURES, R1546, 1/4 IN X 4 IN (6 MM X 100 MM), 10 STRIPS/ENVELOPE: Brand: 3M™ STERI-STRIP™

## (undated) DEVICE — DRESSING MEPILEX AG BORDER 4 X 4 IN

## (undated) DEVICE — ENDOPATH XCEL BLADELESS TROCARS WITH STABILITY SLEEVES: Brand: ENDOPATH XCEL

## (undated) DEVICE — INTENDED FOR TISSUE SEPARATION, AND OTHER PROCEDURES THAT REQUIRE A SHARP SURGICAL BLADE TO PUNCTURE OR CUT.: Brand: BARD-PARKER SAFETY BLADES SIZE 15, STERILE

## (undated) DEVICE — SYRINGE 10ML LL

## (undated) DEVICE — PAD GROUNDING ADULT

## (undated) DEVICE — GLOVE SRG BIOGEL ECLIPSE 7.5

## (undated) DEVICE — IMPERVIOUS STOCKINETTE: Brand: DEROYAL

## (undated) DEVICE — COTTON TIP APPLICTOR 2 PK

## (undated) DEVICE — PMI DISPOSABLE PUNCTURE CLOSURE DEVICE / SUTURE GRASPER: Brand: PMI

## (undated) DEVICE — PENCIL ELECTROSURG E-Z CLEAN -0035H

## (undated) DEVICE — TROCAR: Brand: KII® SLEEVE

## (undated) DEVICE — GLOVE SRG BIOGEL 7.5

## (undated) DEVICE — SUT MONOCRYL 4-0 PS-2 18 IN Y496G

## (undated) DEVICE — POWER SHELL SIGNIA

## (undated) DEVICE — STAPLER ENDO GIA ROTICULATOR 60-2.5

## (undated) DEVICE — ALLENTOWN LAP CHOLE APP PACK: Brand: CARDINAL HEALTH

## (undated) DEVICE — ELECTRODE LAP SPATULA STR E-Z CLEAN 33CM -0018

## (undated) DEVICE — ELECTRODE LAP L WIRE E-Z CLEAN 33CM -0100

## (undated) DEVICE — SUT FIBERWIRE #2 1/2 CIRCLE T-5 38IN AR-7200

## (undated) DEVICE — WEBRIL 6 IN UNSTERILE

## (undated) DEVICE — SUT FIBERWIRE 2-0 3/8 CIRCLE 38 IN AR-7221

## (undated) DEVICE — TIBURON LAPAROTOMY DRAPE: Brand: CONVERTORS

## (undated) DEVICE — COVIDIEN ENDO GIA PURPLE (MED) RELOAD 60MM

## (undated) DEVICE — SUT ETHIBOND 2-0 SH/SH 36 IN X523H

## (undated) DEVICE — SUT VICRYL 0 18 IN J906G

## (undated) DEVICE — 5 MM CURVED DISSECTORS WITH MONOPOLAR CAUTERY: Brand: ENDOPATH

## (undated) DEVICE — HARMONIC ACE +7 LAPAROSCOPIC SHEARS ADVANCED HEMOSTASIS 5MM DIAMETER 36CM SHAFT LENGTH  FOR USE WITH GRAY HAND PIECE ONLY: Brand: HARMONIC ACE

## (undated) DEVICE — TUBING SMOKE EVAC W/FILTRATION DEVICE PLUMEPORT ACTIV

## (undated) DEVICE — SUT VICRYL 0 CT-1 27 IN J260H

## (undated) DEVICE — PACK GENERAL LF

## (undated) DEVICE — DRAPE UTILITY

## (undated) DEVICE — GLOVE SRG BIOGEL 7

## (undated) DEVICE — ADHESIVE SKIN HIGH VISCOSITY EXOFIN 1ML

## (undated) DEVICE — TISSUE RETRIEVAL SYSTEM: Brand: INZII RETRIEVAL SYSTEM

## (undated) DEVICE — GAUZE SPONGES,8 PLY: Brand: CURITY

## (undated) DEVICE — SUT MONOCRYL 4-0 PS-2 27 IN Y426H

## (undated) DEVICE — ENDOPOUCH RETRIEVER SPECIMEN RETRIEVAL BAGS: Brand: ENDOPOUCH RETRIEVER